# Patient Record
Sex: FEMALE | Race: WHITE | NOT HISPANIC OR LATINO | Employment: OTHER | URBAN - METROPOLITAN AREA
[De-identification: names, ages, dates, MRNs, and addresses within clinical notes are randomized per-mention and may not be internally consistent; named-entity substitution may affect disease eponyms.]

---

## 2017-08-22 ENCOUNTER — APPOINTMENT (OUTPATIENT)
Dept: WOMENS IMAGING | Facility: HOSPITAL | Age: 57
End: 2017-08-22

## 2017-08-22 PROCEDURE — 77067 SCR MAMMO BI INCL CAD: CPT | Performed by: RADIOLOGY

## 2017-10-25 ENCOUNTER — APPOINTMENT (OUTPATIENT)
Dept: WOMENS IMAGING | Facility: HOSPITAL | Age: 57
End: 2017-10-25

## 2017-10-25 PROCEDURE — 76641 ULTRASOUND BREAST COMPLETE: CPT | Performed by: RADIOLOGY

## 2017-10-25 PROCEDURE — G0206 DX MAMMO INCL CAD UNI: HCPCS | Performed by: RADIOLOGY

## 2017-10-25 PROCEDURE — G0279 TOMOSYNTHESIS, MAMMO: HCPCS | Performed by: RADIOLOGY

## 2017-10-25 PROCEDURE — 77065 DX MAMMO INCL CAD UNI: CPT | Performed by: RADIOLOGY

## 2017-10-25 PROCEDURE — 77061 BREAST TOMOSYNTHESIS UNI: CPT | Performed by: RADIOLOGY

## 2018-04-27 ENCOUNTER — APPOINTMENT (OUTPATIENT)
Dept: WOMENS IMAGING | Facility: HOSPITAL | Age: 58
End: 2018-04-27

## 2018-04-27 PROCEDURE — 76641 ULTRASOUND BREAST COMPLETE: CPT | Performed by: RADIOLOGY

## 2018-12-03 ENCOUNTER — APPOINTMENT (OUTPATIENT)
Dept: WOMENS IMAGING | Facility: HOSPITAL | Age: 58
End: 2018-12-03

## 2018-12-03 PROCEDURE — 77067 SCR MAMMO BI INCL CAD: CPT | Performed by: RADIOLOGY

## 2018-12-03 PROCEDURE — 77063 BREAST TOMOSYNTHESIS BI: CPT | Performed by: RADIOLOGY

## 2018-12-17 ENCOUNTER — OFFICE VISIT (OUTPATIENT)
Dept: MAMMOGRAPHY | Facility: CLINIC | Age: 58
End: 2018-12-17

## 2018-12-17 ENCOUNTER — PREP FOR SURGERY (OUTPATIENT)
Dept: OTHER | Facility: HOSPITAL | Age: 58
End: 2018-12-17

## 2018-12-17 VITALS
SYSTOLIC BLOOD PRESSURE: 122 MMHG | HEART RATE: 73 BPM | OXYGEN SATURATION: 95 % | DIASTOLIC BLOOD PRESSURE: 78 MMHG | TEMPERATURE: 98.9 F | WEIGHT: 135.7 LBS | HEIGHT: 64 IN | BODY MASS INDEX: 23.17 KG/M2

## 2018-12-17 DIAGNOSIS — N63.20 BREAST MASS, LEFT: Primary | ICD-10-CM

## 2018-12-17 DIAGNOSIS — N63.20 LEFT BREAST MASS: Primary | ICD-10-CM

## 2018-12-17 PROCEDURE — 99203 OFFICE O/P NEW LOW 30 MIN: CPT | Performed by: SURGERY

## 2018-12-17 RX ORDER — DICYCLOMINE HCL 20 MG
20 TABLET ORAL EVERY 6 HOURS PRN
COMMUNITY

## 2018-12-17 RX ORDER — UBIDECARENONE 100 MG
200 CAPSULE ORAL DAILY
COMMUNITY

## 2018-12-17 RX ORDER — ESTRADIOL 10 UG/1
1 INSERT VAGINAL 2 TIMES WEEKLY
COMMUNITY
End: 2019-02-20 | Stop reason: HOSPADM

## 2018-12-17 RX ORDER — MULTIPLE VITAMINS W/ MINERALS TAB 9MG-400MCG
1 TAB ORAL DAILY
COMMUNITY

## 2018-12-17 RX ORDER — CEFAZOLIN SODIUM 2 G/100ML
2 INJECTION, SOLUTION INTRAVENOUS ONCE
Status: CANCELLED | OUTPATIENT
Start: 2018-12-20 | End: 2018-12-17

## 2018-12-17 NOTE — H&P (VIEW-ONLY)
Chief Complaint: Bette Eldridge is a 58 y.o.. female here today for Breast Mass (Cyst on left nipple)        History of Present Illness:  Patient presents with a mass near the base of the left nipple.  She first noticed it about a year ago and said that it was the size of a pea.  It was not associated with any pain, nipple discharge, or trauma such as a nipple ring.  There has never been a history of infection.  Over the last year the area has increased in size.  Imaging studies were performed over the last several years.  In October 2017 the patient was noted to have retropectoral intact silicone implants.  The mammogram did not show any nipple irregularities.  An ultrasound however described a 7 mm focal skin thickening suggestive of a sebaceous cyst.  The patient had a follow-up ultrasound in April 2018 which showed a stable 7 mm in the skin still mostly compatible with a sebaceous cyst.  She then underwent mammograms in December of this year and no abnormalities were detected.  The patient has noted some change in the shape of the nipple.  Her family history is significant for a mother who had breast cancer.      Review of Systems:  Review of Systems   Gastrointestinal:        Has IBS   Skin:        Patient has cyst on left nipple. No itching or rash.    All other systems reviewed and are negative.       Past Medical and Surgical History:  Breast Biopsy History:  Patient has not had a breast biopsy in the past.  Breast Cancer HIstory:  Patient does not have a past medical history of breast cancer.  Breast Operations, and year:  Breast augmentation  Social History     Tobacco Use   Smoking Status Former Smoker   • Packs/day: 0.50   • Years: 10.00   • Pack years: 5.00   Smokeless Tobacco Never Used     Obstetric History:  Patient is postmenopausal due to removal of her uterus and one ovary in the following year: 2000   Number of pregnancies:6  Number of live births: 4  Number of abortions or miscarriages: 2  Age  of delivery of first child: 21  Patient breast fed, for the following lenth of time: 18 months  Length of time taking birth control pills:20 years  Patient is presently taking the following hormone replacement:Yuvafem    Past Surgical History:   Procedure Laterality Date   • BACK SURGERY     • BREAST AUGMENTATION     • BUNIONECTOMY     • HYSTERECTOMY         Past Medical History:   Diagnosis Date   • IBS (irritable bowel syndrome)        Prior Hospitalizations, other than for surgery or childbirth, and year:  None    Social History:  Patient is .  Patient has two daughters. and Patient has two sons.    Family History:  Family History   Problem Relation Age of Onset   • Breast cancer Mother    • Esophageal cancer Father        Vital Signs:  Vitals:    12/17/18 0829   BP: 122/78   Pulse: 73   Temp: 98.9 °F (37.2 °C)   SpO2: 95%       Medications:    Current Outpatient Prescriptions:     Current Outpatient Medications:   •  coenzyme Q10 100 MG capsule, Take 100 mg by mouth Daily., Disp: , Rfl:   •  collagenase 250 UNIT/GM ointment, Apply  topically to the appropriate area as directed Daily., Disp: , Rfl:   •  dicyclomine (BENTYL) 20 MG tablet, Take 20 mg by mouth Every 6 (Six) Hours., Disp: , Rfl:   •  estradiol (VAGIFEM) 10 MCG tablet vaginal tablet, Insert 1 tablet into the vagina 2 (Two) Times a Week., Disp: , Rfl:   •  Milk Thistle 1000 MG capsule, Take  by mouth., Disp: , Rfl:   •  Multiple Vitamins-Minerals (MULTIVITAMIN WITH MINERALS) tablet tablet, Take 1 tablet by mouth Daily., Disp: , Rfl:     Physical Examination:  General Appearance:   Patient is in no distress.  She is well kept and has an average build.   Psychiatric:  Patient with appropriate mood and affect. Alert and oriented to self, time, and place.    Breast, RIGHT:  medium sized, symmetric with the contralateral side.  Breast skin is without erythema, edema, rashes.  There are no visible abnormalities upon inspection during the arm-raising  maneuver or with hands on hips in the sitting position. There is no nipple retraction, discharge or nipple/areolar skin changes.There are no masses palpable in the sitting or supine positions.    Breast, LEFT:  medium sized, symmetric with the contralateral side.  Breast skin is without erythema, edema, rashes.  There are no visible abnormalities upon inspection during the arm-raising maneuver or with hands on hips in the sitting position. The nipple does seem to be oriented a little more in the medial direction.  There is a somewhat firm mass that appears to be involving the base of the nipple in the 5:00 axis and extends up towards the tip of the nipple.  There is a deeper aspect to it that is clearly palpable.  I do not see a classic skin connection that you would typically see with a sebaceous cyst.    Lymphatic:  There is no axillary, cervical, infraclavicular, or supraclavicular adenopathy bilaterally.  Eyes:  Pupils are round and reactive to light.  Cardiovascular:  Heart rate and rhythm are regular.  Respiratory:  Lungs are clear bilaterally with no crackles or wheezes in any lung field.  Gastrointestinal:  Abdomen is soft, nondistended, and nontender.  There was no evidence of hepatosplenomegaly or abdominal mass.  There are  scars from previous surgery.    Musculoskeletal:  Good strength in all 4 extremities.   There is good range of motion in both shoulders.    Skin:  No new skin lesions or rashes on the skin excluding the breast (see breast exam above).    Assessment:  1. Breast mass, left          Plan:  This palpable mass is somewhat suspicious to me.  It does not have the classic physical findings of a sebaceous cyst and the location is so odd.  I do not think it is amenable to a vacuum assisted core biopsy.  I think an open biopsy would be her best option and she agrees to that.  We have discussed what was involved with the operation and the recovery.  She understands the small risk of infection,  bleeding, and an anesthetic complication.      CPT coding:    Next Appointment:  No Follow-up on file.            EMR Dragon/transcription disclaimer:    Much of this encounter note is an electronic transcription/translocation of spoken language to printed text.  The electronic translation of spoken language may permit erroneous, or at times, nonsensical words or phrases to be inadvertently transcribed.  Although I have reviewed the note from such areas, some may still exist.

## 2018-12-20 ENCOUNTER — HOSPITAL ENCOUNTER (OUTPATIENT)
Facility: HOSPITAL | Age: 58
Setting detail: HOSPITAL OUTPATIENT SURGERY
Discharge: HOME | End: 2018-12-20
Attending: SURGERY | Admitting: SURGERY

## 2018-12-20 ENCOUNTER — ANESTHESIA EVENT (OUTPATIENT)
Dept: PERIOP | Facility: HOSPITAL | Age: 58
End: 2018-12-20

## 2018-12-20 ENCOUNTER — ANESTHESIA (OUTPATIENT)
Dept: PERIOP | Facility: HOSPITAL | Age: 58
End: 2018-12-20

## 2018-12-20 VITALS
RESPIRATION RATE: 16 BRPM | HEIGHT: 64 IN | OXYGEN SATURATION: 99 % | WEIGHT: 135.8 LBS | TEMPERATURE: 98 F | BODY MASS INDEX: 23.18 KG/M2 | SYSTOLIC BLOOD PRESSURE: 130 MMHG | DIASTOLIC BLOOD PRESSURE: 79 MMHG | HEART RATE: 60 BPM

## 2018-12-20 DIAGNOSIS — N63.20 LEFT BREAST MASS: ICD-10-CM

## 2018-12-20 LAB
DEPRECATED RDW RBC AUTO: 46 FL (ref 37–54)
ERYTHROCYTE [DISTWIDTH] IN BLOOD BY AUTOMATED COUNT: 13.2 % (ref 11.7–13)
HCT VFR BLD AUTO: 37.3 % (ref 35.6–45.5)
HGB BLD-MCNC: 12.7 G/DL (ref 11.9–15.5)
MCH RBC QN AUTO: 32.3 PG (ref 26.9–32)
MCHC RBC AUTO-ENTMCNC: 34 G/DL (ref 32.4–36.3)
MCV RBC AUTO: 94.9 FL (ref 80.5–98.2)
PLATELET # BLD AUTO: 174 10*3/MM3 (ref 140–500)
PMV BLD AUTO: 8.4 FL (ref 6–12)
RBC # BLD AUTO: 3.93 10*6/MM3 (ref 3.9–5.2)
WBC NRBC COR # BLD: 5.73 10*3/MM3 (ref 4.5–10.7)

## 2018-12-20 PROCEDURE — 25010000002 FENTANYL CITRATE (PF) 100 MCG/2ML SOLUTION: Performed by: ANESTHESIOLOGY

## 2018-12-20 PROCEDURE — 19120 REMOVAL OF BREAST LESION: CPT | Performed by: SURGERY

## 2018-12-20 PROCEDURE — 85027 COMPLETE CBC AUTOMATED: CPT | Performed by: SURGERY

## 2018-12-20 PROCEDURE — 88307 TISSUE EXAM BY PATHOLOGIST: CPT | Performed by: SURGERY

## 2018-12-20 PROCEDURE — 88342 IMHCHEM/IMCYTCHM 1ST ANTB: CPT | Performed by: SURGERY

## 2018-12-20 PROCEDURE — 25010000002 PROPOFOL 10 MG/ML EMULSION: Performed by: ANESTHESIOLOGY

## 2018-12-20 PROCEDURE — 88341 IMHCHEM/IMCYTCHM EA ADD ANTB: CPT | Performed by: SURGERY

## 2018-12-20 PROCEDURE — 25010000003 CEFAZOLIN IN DEXTROSE 2-4 GM/100ML-% SOLUTION: Performed by: SURGERY

## 2018-12-20 PROCEDURE — 25010000002 MIDAZOLAM PER 1 MG: Performed by: ANESTHESIOLOGY

## 2018-12-20 PROCEDURE — 88360 TUMOR IMMUNOHISTOCHEM/MANUAL: CPT | Performed by: SURGERY

## 2018-12-20 RX ORDER — SODIUM CHLORIDE, SODIUM LACTATE, POTASSIUM CHLORIDE, CALCIUM CHLORIDE 600; 310; 30; 20 MG/100ML; MG/100ML; MG/100ML; MG/100ML
9 INJECTION, SOLUTION INTRAVENOUS CONTINUOUS
Status: DISCONTINUED | OUTPATIENT
Start: 2018-12-20 | End: 2018-12-20 | Stop reason: HOSPADM

## 2018-12-20 RX ORDER — MAGNESIUM HYDROXIDE 1200 MG/15ML
LIQUID ORAL AS NEEDED
Status: DISCONTINUED | OUTPATIENT
Start: 2018-12-20 | End: 2018-12-20 | Stop reason: HOSPADM

## 2018-12-20 RX ORDER — BUPIVACAINE HYDROCHLORIDE 2.5 MG/ML
INJECTION, SOLUTION INFILTRATION; PERINEURAL AS NEEDED
Status: DISCONTINUED | OUTPATIENT
Start: 2018-12-20 | End: 2018-12-20 | Stop reason: HOSPADM

## 2018-12-20 RX ORDER — MIDAZOLAM HYDROCHLORIDE 1 MG/ML
1 INJECTION INTRAMUSCULAR; INTRAVENOUS
Status: DISCONTINUED | OUTPATIENT
Start: 2018-12-20 | End: 2018-12-20 | Stop reason: HOSPADM

## 2018-12-20 RX ORDER — MIDAZOLAM HYDROCHLORIDE 1 MG/ML
2 INJECTION INTRAMUSCULAR; INTRAVENOUS
Status: DISCONTINUED | OUTPATIENT
Start: 2018-12-20 | End: 2018-12-20 | Stop reason: HOSPADM

## 2018-12-20 RX ORDER — PROPOFOL 10 MG/ML
VIAL (ML) INTRAVENOUS AS NEEDED
Status: DISCONTINUED | OUTPATIENT
Start: 2018-12-20 | End: 2018-12-20 | Stop reason: SURG

## 2018-12-20 RX ORDER — FENTANYL CITRATE 50 UG/ML
25 INJECTION, SOLUTION INTRAMUSCULAR; INTRAVENOUS
Status: DISCONTINUED | OUTPATIENT
Start: 2018-12-20 | End: 2018-12-20 | Stop reason: HOSPADM

## 2018-12-20 RX ORDER — SODIUM CHLORIDE 0.9 % (FLUSH) 0.9 %
1-10 SYRINGE (ML) INJECTION AS NEEDED
Status: DISCONTINUED | OUTPATIENT
Start: 2018-12-20 | End: 2018-12-20 | Stop reason: HOSPADM

## 2018-12-20 RX ORDER — LIDOCAINE HYDROCHLORIDE 10 MG/ML
0.5 INJECTION, SOLUTION EPIDURAL; INFILTRATION; INTRACAUDAL; PERINEURAL ONCE AS NEEDED
Status: DISCONTINUED | OUTPATIENT
Start: 2018-12-20 | End: 2018-12-20 | Stop reason: HOSPADM

## 2018-12-20 RX ORDER — PROMETHAZINE HYDROCHLORIDE 25 MG/1
25 TABLET ORAL ONCE AS NEEDED
Status: DISCONTINUED | OUTPATIENT
Start: 2018-12-20 | End: 2018-12-20 | Stop reason: HOSPADM

## 2018-12-20 RX ORDER — FENTANYL CITRATE 50 UG/ML
50 INJECTION, SOLUTION INTRAMUSCULAR; INTRAVENOUS
Status: DISCONTINUED | OUTPATIENT
Start: 2018-12-20 | End: 2018-12-20 | Stop reason: HOSPADM

## 2018-12-20 RX ORDER — LIDOCAINE HYDROCHLORIDE 20 MG/ML
INJECTION, SOLUTION INFILTRATION; PERINEURAL AS NEEDED
Status: DISCONTINUED | OUTPATIENT
Start: 2018-12-20 | End: 2018-12-20 | Stop reason: SURG

## 2018-12-20 RX ORDER — FAMOTIDINE 10 MG/ML
20 INJECTION, SOLUTION INTRAVENOUS ONCE
Status: COMPLETED | OUTPATIENT
Start: 2018-12-20 | End: 2018-12-20

## 2018-12-20 RX ORDER — PROMETHAZINE HYDROCHLORIDE 25 MG/1
25 SUPPOSITORY RECTAL ONCE AS NEEDED
Status: DISCONTINUED | OUTPATIENT
Start: 2018-12-20 | End: 2018-12-20 | Stop reason: HOSPADM

## 2018-12-20 RX ORDER — FENTANYL CITRATE 50 UG/ML
INJECTION, SOLUTION INTRAMUSCULAR; INTRAVENOUS AS NEEDED
Status: DISCONTINUED | OUTPATIENT
Start: 2018-12-20 | End: 2018-12-20 | Stop reason: SURG

## 2018-12-20 RX ORDER — HYDROCODONE BITARTRATE AND ACETAMINOPHEN 5; 325 MG/1; MG/1
1-2 TABLET ORAL EVERY 4 HOURS PRN
Qty: 6 TABLET | Refills: 0 | Status: SHIPPED | OUTPATIENT
Start: 2018-12-20 | End: 2019-02-11

## 2018-12-20 RX ORDER — CEFAZOLIN SODIUM 2 G/100ML
2 INJECTION, SOLUTION INTRAVENOUS ONCE
Status: COMPLETED | OUTPATIENT
Start: 2018-12-20 | End: 2018-12-20

## 2018-12-20 RX ORDER — PROMETHAZINE HYDROCHLORIDE 25 MG/ML
6.25 INJECTION, SOLUTION INTRAMUSCULAR; INTRAVENOUS ONCE AS NEEDED
Status: DISCONTINUED | OUTPATIENT
Start: 2018-12-20 | End: 2018-12-20 | Stop reason: HOSPADM

## 2018-12-20 RX ORDER — HYDROCODONE BITARTRATE AND ACETAMINOPHEN 5; 325 MG/1; MG/1
1 TABLET ORAL ONCE AS NEEDED
Status: COMPLETED | OUTPATIENT
Start: 2018-12-20 | End: 2018-12-20

## 2018-12-20 RX ADMIN — HYDROCODONE BITARTRATE AND ACETAMINOPHEN 1 TABLET: 5; 325 TABLET ORAL at 17:55

## 2018-12-20 RX ADMIN — MIDAZOLAM HYDROCHLORIDE 2 MG: 2 INJECTION, SOLUTION INTRAMUSCULAR; INTRAVENOUS at 14:49

## 2018-12-20 RX ADMIN — SODIUM CHLORIDE, POTASSIUM CHLORIDE, SODIUM LACTATE AND CALCIUM CHLORIDE 9 ML/HR: 600; 310; 30; 20 INJECTION, SOLUTION INTRAVENOUS at 14:09

## 2018-12-20 RX ADMIN — FENTANYL CITRATE 50 MCG: 50 INJECTION INTRAMUSCULAR; INTRAVENOUS at 16:09

## 2018-12-20 RX ADMIN — LIDOCAINE HYDROCHLORIDE 60 MG: 20 INJECTION, SOLUTION INFILTRATION; PERINEURAL at 16:09

## 2018-12-20 RX ADMIN — FENTANYL CITRATE 50 MCG: 50 INJECTION INTRAMUSCULAR; INTRAVENOUS at 16:49

## 2018-12-20 RX ADMIN — FAMOTIDINE 20 MG: 10 INJECTION, SOLUTION INTRAVENOUS at 14:49

## 2018-12-20 RX ADMIN — CEFAZOLIN SODIUM 2 G: 2 INJECTION, SOLUTION INTRAVENOUS at 16:04

## 2018-12-20 RX ADMIN — PROPOFOL 150 MG: 10 INJECTION, EMULSION INTRAVENOUS at 16:09

## 2018-12-20 NOTE — ANESTHESIA POSTPROCEDURE EVALUATION
Patient: SARITHA Eldridge    Procedure Summary     Date:  12/20/18 Room / Location:  [facility] OR  / [facility] MAIN OR    Anesthesia Start:  1604 Anesthesia Stop:  1657    Procedure:  BREAST BIOPSY (Left Breast) Diagnosis:       Left breast mass      (Left breast mass [N63.20])    Surgeon:  Mike Adams MD Provider:  Zach Villagomez MD    Anesthesia Type:  general ASA Status:  2          Anesthesia Type: general  Last vitals  BP   118/75 (12/20/18 1815)   Temp   36.7 °C (98 °F) (12/20/18 1750)   Pulse   76 (12/20/18 1815)   Resp   16 (12/20/18 1815)     SpO2   97 % (12/20/18 1815)     Post Anesthesia Care and Evaluation    Patient location during evaluation: PHASE II  Patient participation: complete - patient participated  Level of consciousness: awake and alert  Pain management: adequate  Airway patency: patent  Anesthetic complications: No anesthetic complications  PONV Status: none  Cardiovascular status: acceptable  Respiratory status: acceptable  Hydration status: acceptable

## 2018-12-20 NOTE — ANESTHESIA PREPROCEDURE EVALUATION
Anesthesia Evaluation     Patient summary reviewed   NPO Solid Status: > 8 hours  NPO Liquid Status: > 2 hours           Airway   Mallampati: II  TM distance: >3 FB  Dental      Pulmonary    Cardiovascular     Rhythm: regular  Rate: normal        Neuro/Psych  GI/Hepatic/Renal/Endo      ROS Comment: IBS    Musculoskeletal     Abdominal    Substance History      OB/GYN          Other                        Anesthesia Plan    ASA 2     general     intravenous induction   Anesthetic plan, all risks, benefits, and alternatives have been provided, discussed and informed consent has been obtained with: patient.

## 2018-12-20 NOTE — OP NOTE
Operative note    Preoperative Diagnosis: Breast mass    Postoperative Diagnosis: Same    Procedure Performed:left breast  excisional biopsy    Dictating physician and surgeon: Mike Adams MD  Indications-the patient is a 58-year-old white female who has had a mass at the base of the nipple which is gradually increased in size.  Imaging studies suggest a sebaceous cyst but it does not appear to represent that on physical examination.  Her mother had breast cancer and I feel that biopsy of this area would be the best thing to do.    EBL: Less than 5 cc.    FINDINGS AND DESCRIPTION OF PROCEDURE:     The patient was taken to the operating room and placed on the operating table in the supine position and given adequate general anesthesia. The left breast was then prepped and draped in sterile fashion.  The involved area was anesthetized with a combination of quarter percent Marcaine plain and 1% Xylocaine with epinephrine.  A total of 8 cc was used.  An elliptical skin incision was made over the palpable lump at the inferior base of the nipple.  Dissection was carried down around the mass in a circumferential fashion and the specimen was removed and sent to pathology for permanent sections.          The wound was irrigated and hemostasis obtained using electrocautery unit.  The incision was then closed in layers using 3-0 Vicryl suture for the subcutaneous layer and a running 4-0 Vicryl subcuticular stitch for the skin.   Steri-Strips and a Tegaderm dressing were applied.     Sponge and needle counts were correct and the patient was taken to the recovery area in stable condition.    Specimen-left subareolar tissue

## 2018-12-26 DIAGNOSIS — C50.112 MALIGNANT NEOPLASM OF CENTRAL PORTION OF LEFT BREAST IN FEMALE, ESTROGEN RECEPTOR POSITIVE (HCC): Primary | ICD-10-CM

## 2018-12-26 DIAGNOSIS — Z17.0 MALIGNANT NEOPLASM OF CENTRAL PORTION OF LEFT BREAST IN FEMALE, ESTROGEN RECEPTOR POSITIVE (HCC): Primary | ICD-10-CM

## 2018-12-31 LAB
CYTO UR: NORMAL
LAB AP CASE REPORT: NORMAL
LAB AP DIAGNOSIS COMMENT: NORMAL
LAB AP SPECIAL STAINS: NORMAL
LAB AP SYNOPTIC CHECKLIST: NORMAL
PATH REPORT.FINAL DX SPEC: NORMAL
PATH REPORT.GROSS SPEC: NORMAL

## 2019-01-07 ENCOUNTER — HOSPITAL ENCOUNTER (OUTPATIENT)
Dept: MRI IMAGING | Facility: HOSPITAL | Age: 59
Discharge: HOME | End: 2019-01-07
Attending: SURGERY | Admitting: SURGERY

## 2019-01-07 ENCOUNTER — TELEPHONE (OUTPATIENT)
Dept: MAMMOGRAPHY | Facility: CLINIC | Age: 59
End: 2019-01-07

## 2019-01-07 DIAGNOSIS — C50.112 MALIGNANT NEOPLASM OF CENTRAL PORTION OF LEFT BREAST IN FEMALE, ESTROGEN RECEPTOR POSITIVE (HCC): ICD-10-CM

## 2019-01-07 DIAGNOSIS — Z17.0 MALIGNANT NEOPLASM OF CENTRAL PORTION OF LEFT BREAST IN FEMALE, ESTROGEN RECEPTOR POSITIVE (HCC): ICD-10-CM

## 2019-01-07 PROCEDURE — 77049 MRI BREAST C-+ W/CAD BI: CPT

## 2019-01-07 PROCEDURE — 0 GADOBENATE DIMEGLUMINE 529 MG/ML SOLUTION: Performed by: SURGERY

## 2019-01-07 PROCEDURE — 82565 ASSAY OF CREATININE: CPT

## 2019-01-07 PROCEDURE — A9577 INJ MULTIHANCE: HCPCS | Performed by: SURGERY

## 2019-01-07 RX ADMIN — GADOBENATE DIMEGLUMINE 12 ML: 529 INJECTION, SOLUTION INTRAVENOUS at 20:45

## 2019-01-07 NOTE — TELEPHONE ENCOUNTER
I was able to give her the report port of her receptors.  She is going to [place] and will return in time for her visit in the office on 1/17/2019

## 2019-01-08 LAB — CREAT BLDA-MCNC: 0.8 MG/DL (ref 0.6–1.3)

## 2019-01-09 ENCOUNTER — TELEPHONE (OUTPATIENT)
Dept: MAMMOGRAPHY | Facility: CLINIC | Age: 59
End: 2019-01-09

## 2019-01-09 NOTE — TELEPHONE ENCOUNTER
I gave her the results of her MRI in a message over the phone.  She is going to come in for breast cancer consult on 1/17/2019.

## 2019-01-18 ENCOUNTER — OFFICE VISIT (OUTPATIENT)
Dept: MAMMOGRAPHY | Facility: CLINIC | Age: 59
End: 2019-01-18

## 2019-01-18 VITALS
DIASTOLIC BLOOD PRESSURE: 64 MMHG | SYSTOLIC BLOOD PRESSURE: 122 MMHG | TEMPERATURE: 98.3 F | HEART RATE: 78 BPM | OXYGEN SATURATION: 98 %

## 2019-01-18 DIAGNOSIS — C50.112 MALIGNANT NEOPLASM OF CENTRAL PORTION OF LEFT BREAST IN FEMALE, ESTROGEN RECEPTOR POSITIVE (HCC): Primary | ICD-10-CM

## 2019-01-18 DIAGNOSIS — Z17.0 MALIGNANT NEOPLASM OF CENTRAL PORTION OF LEFT BREAST IN FEMALE, ESTROGEN RECEPTOR POSITIVE (HCC): Primary | ICD-10-CM

## 2019-01-18 PROCEDURE — 99215 OFFICE O/P EST HI 40 MIN: CPT | Performed by: SURGERY

## 2019-01-18 NOTE — PROGRESS NOTES
Fran Eldridge is a 58 y.o. female     History of Present Illness   The patient returns today to discuss findings from her recent breast biopsy.  Unfortunately the pathology report has returned revealing an 8 mm invasive ductal cancer with positive margins.  The tumor was ER positive, UT positive, and HER-2 negative.  The Ki-67 was 11%.      Review of Systems  Past Medical History:   Diagnosis Date   • Breast lump     LEFT   • IBS (irritable bowel syndrome)    • Mild scoliosis      Past Surgical History:   Procedure Laterality Date   • BACK SURGERY  07/2018   • BREAST AUGMENTATION     • BREAST BIOPSY Left 12/20/2018    Procedure: BREAST BIOPSY;  Surgeon: Mike Adams MD;  Location: [facility] OR;  Service: General   • BUNIONECTOMY Bilateral    • CYSTOCELE REPAIR     • HERNIA REPAIR      X2   • HYSTERECTOMY       Family History   Problem Relation Age of Onset   • Breast cancer Mother    • Esophageal cancer Father    • Malig Hyperthermia Neg Hx      Social History     Socioeconomic History   • Marital status:      Spouse name: Not on file   • Number of children: Not on file   • Years of education: Not on file   • Highest education level: Not on file   Social Needs   • Financial resource strain: Not on file   • Food insecurity - worry: Not on file   • Food insecurity - inability: Not on file   • Transportation needs - medical: Not on file   • Transportation needs - non-medical: Not on file   Occupational History   • Not on file   Tobacco Use   • Smoking status: Former Smoker     Packs/day: 0.50     Years: 10.00     Pack years: 5.00     Types: Cigarettes   • Smokeless tobacco: Never Used   • Tobacco comment: QUIT 20-30 YEARS AGO   Substance and Sexual Activity   • Alcohol use: Yes     Alcohol/week: 4.2 - 8.4 oz     Types: 7 - 14 Glasses of wine per week   • Drug use: No   • Sexual activity: Defer   Other Topics Concern   • Not on file   Social History Narrative   • Not on file        Objective   Physical Exam  Gen.-average weight white female in no acute distress  Vital signs-blood pressure 122/64, pulse 78, temperature 98.3  Left breast-she has a healing incision around the base of the nipple.  I do not see any signs of infection and there is no wound drainage.  Assessment/Plan   The patient was accompanied by her  today.  The office visit lasted 1 hour and 20 minutes with 1 hour and 5 minutes spent in face-to-face consultation.    We began the conversation discussing her pathology report.  We talked about the origin of most of breast cancers from either the ducts or the lobules.  The difference between invasive and in situ disease was explained and the visual was drawn for her.  When invasion has occurred, the lymph nodes need to be evaluated.  When there is in situ disease the lymph nodes should be considered if the area of concern is widespread or it is high-grade.  We also discussed the significance of hormone receptors.  They often can give us some idea how the breast cancer will behave and potentially lead us to offer neoadjuvant chemotherapy.    Next we discussed the surgical options which include breast conserving therapy versus a mastectomy.  With breast conserving therapy we are talking about a lumpectomy with margins, lymph node evaluation, and radiation treatment.  The radiation treatment is generally given to the entire breast for 6 weeks.  The side effects consist of local skin change and potential injury to nearby structures such as the lung or the heart.  A mastectomy would involve removing the breast tissues with preservation of the pectoral muscles and evaluation of the lymph nodes if indicated.  The potential for reconstruction by either an implant or autologous tissue was discussed.  This could be performed on a delayed basis or immediately depending on a multitude of factors.  The survival rates for these 2 procedures are equivalent but there are incidences  where one may be favored over the other.  There are times when a lumpectomy is not possible due to the large tumor to breast ratio or the location of the tumor.  Previous radiation to the chest wall or collagen disorders such as scleroderma would make radiation treatment and possible and therefore exclude breast conserving therapy as an option.    Her tumor is in a very unusual place.  If we go with breast conserving surgery she will need a central lumpectomy.  She was somewhat tearful about this but she could undergo some reconstruction with tattooing.  If we proceeded with a mastectomy she would require removal of the implant and placement of an expander until the permanent implant could be placed.  That was not preferable to her.  I think she also would qualify for genetic testing.  Her mother had breast cancer and a brother had leukemia and I think colon cancer.  She wants to think about this before deciding upon that.  For now we will proceed with plans to do a central lumpectomy and sentinel lymph node biopsy.    The encounter diagnosis was Malignant neoplasm of central portion of left breast in female, estrogen receptor positive (CMS/HCC).

## 2019-01-22 ENCOUNTER — TELEPHONE (OUTPATIENT)
Dept: OTHER | Facility: HOSPITAL | Age: 59
End: 2019-01-22

## 2019-01-22 ENCOUNTER — PREP FOR SURGERY (OUTPATIENT)
Dept: OTHER | Facility: HOSPITAL | Age: 59
End: 2019-01-22

## 2019-01-22 DIAGNOSIS — Z17.0 MALIGNANT NEOPLASM OF CENTRAL PORTION OF LEFT BREAST IN FEMALE, ESTROGEN RECEPTOR POSITIVE (HCC): Primary | ICD-10-CM

## 2019-01-22 DIAGNOSIS — C50.112 MALIGNANT NEOPLASM OF CENTRAL PORTION OF LEFT BREAST IN FEMALE, ESTROGEN RECEPTOR POSITIVE (HCC): Primary | ICD-10-CM

## 2019-01-22 RX ORDER — LIDOCAINE AND PRILOCAINE 25; 25 MG/G; MG/G
CREAM TOPICAL ONCE
Status: CANCELLED | OUTPATIENT
Start: 2019-02-20 | End: 2019-01-22

## 2019-01-22 RX ORDER — CEFAZOLIN SODIUM 2 G/100ML
2 INJECTION, SOLUTION INTRAVENOUS ONCE
Status: CANCELLED | OUTPATIENT
Start: 2019-02-20 | End: 2019-01-22

## 2019-01-22 RX ORDER — CELECOXIB 200 MG/1
400 CAPSULE ORAL ONCE
Status: CANCELLED | OUTPATIENT
Start: 2019-02-20 | End: 2019-01-22

## 2019-01-22 RX ORDER — ACETAMINOPHEN 500 MG
1000 TABLET ORAL ONCE
Status: CANCELLED | OUTPATIENT
Start: 2019-02-20 | End: 2019-01-22

## 2019-01-22 RX ORDER — DIAZEPAM 5 MG/1
10 TABLET ORAL ONCE
Status: CANCELLED | OUTPATIENT
Start: 2019-02-20 | End: 2019-01-22

## 2019-01-22 NOTE — TELEPHONE ENCOUNTER
Referral received from Dr. Adams's office. I called Bette and introduced myself and navigational services. She stated the surgery consult went well and she was leaning toward a lumpectomy, but wasn't sure yet. She had questions about genetics and how that worked. I answered some of her questions, but encouraged her to talk to Dr. Adams about his thoughts regarding the need for the testing in her case. She stated she would wait to talk to him and let me know.     We discussed integrative therapies and other services at [facility] including the opportunity to speak with our Oncology Dietitian or Oncology Social Worker. She verbalized interest in receiving a navigation folder outlining services. I verified her mailing address and will send out a navigation folder with the following information:     [facility],  Sharing Our Stories Breast Cancer Support Group, Cancer and Restorative Exercise (CARE), [employer]  Exercise program, Together for Breast Cancer Survival,  For Women Facing Breast Cancer,   Biotab,  Cancer Resource [place], Massage Therapy, Reiki Therapy, [employer] [place], Cancer Nutrition, Survivorship Clinic.     She verbalized appreciation for navigational services and she has my contact information and will call with any questions that arise.

## 2019-01-24 PROBLEM — C50.112 MALIGNANT NEOPLASM OF CENTRAL PORTION OF LEFT BREAST IN FEMALE, ESTROGEN RECEPTOR POSITIVE (HCC): Status: ACTIVE | Noted: 2019-01-24

## 2019-01-24 PROBLEM — Z17.0 MALIGNANT NEOPLASM OF CENTRAL PORTION OF LEFT BREAST IN FEMALE, ESTROGEN RECEPTOR POSITIVE (HCC): Status: ACTIVE | Noted: 2019-01-24

## 2019-01-29 ENCOUNTER — CLINICAL SUPPORT (OUTPATIENT)
Dept: OTHER | Facility: HOSPITAL | Age: 59
End: 2019-01-29

## 2019-01-29 DIAGNOSIS — Z80.3 FAMILY HISTORY OF BREAST CANCER: ICD-10-CM

## 2019-01-29 DIAGNOSIS — Z80.6 FAMILY HISTORY OF LEUKEMIA: ICD-10-CM

## 2019-01-29 DIAGNOSIS — Z17.0 MALIGNANT NEOPLASM OF UPPER-INNER QUADRANT OF LEFT BREAST IN FEMALE, ESTROGEN RECEPTOR POSITIVE (HCC): Primary | ICD-10-CM

## 2019-01-29 DIAGNOSIS — C50.212 MALIGNANT NEOPLASM OF UPPER-INNER QUADRANT OF LEFT BREAST IN FEMALE, ESTROGEN RECEPTOR POSITIVE (HCC): Primary | ICD-10-CM

## 2019-01-29 PROCEDURE — G0463 HOSPITAL OUTPT CLINIC VISIT: HCPCS

## 2019-01-29 NOTE — PATIENT INSTRUCTIONS
Patient seen by Dr. Santos for genetic counseling and testing. Lab drawn with 21 gauge butterfly needle in the Left AC times 1 attempt. Stat lab sent to [facility]. Patient informed she would receive phone call with test results.

## 2019-02-04 ENCOUNTER — TELEPHONE (OUTPATIENT)
Dept: MAMMOGRAPHY | Facility: CLINIC | Age: 59
End: 2019-02-04

## 2019-02-04 NOTE — TELEPHONE ENCOUNTER
Patient called to say her daughter has Hepatitis A. Her surgery is Feb. 20 and she is concerned that she's been exposed. She stated she has had the series of immunizations and she thinks it was for all Hepatitis.

## 2019-02-04 NOTE — TELEPHONE ENCOUNTER
I left a message stating that I am not terribly concerned about her potential exposure.  We have more than 2 weeks to see how she is doing and it sounds like she has been vaccinated.

## 2019-02-05 ENCOUNTER — TELEPHONE (OUTPATIENT)
Dept: MAMMOGRAPHY | Facility: CLINIC | Age: 59
End: 2019-02-05

## 2019-02-05 NOTE — TELEPHONE ENCOUNTER
Pt called.  She received Dr Sahu message but wanted to clarify.  Her daughter OR the doctors office relayed the message incorrectly.  Her daughter has had the vaccine and that is what those results were for (the positive) She just wanted to make sure we corrected that in the chart. This is to serve as documentation only.

## 2019-02-11 ENCOUNTER — APPOINTMENT (OUTPATIENT)
Dept: PREADMISSION TESTING | Facility: HOSPITAL | Age: 59
End: 2019-02-11

## 2019-02-11 VITALS
SYSTOLIC BLOOD PRESSURE: 125 MMHG | BODY MASS INDEX: 23.28 KG/M2 | OXYGEN SATURATION: 100 % | TEMPERATURE: 97.8 F | DIASTOLIC BLOOD PRESSURE: 75 MMHG | HEART RATE: 88 BPM | HEIGHT: 64 IN | RESPIRATION RATE: 16 BRPM | WEIGHT: 136.38 LBS

## 2019-02-11 LAB
DEPRECATED RDW RBC AUTO: 44.5 FL (ref 37–54)
ERYTHROCYTE [DISTWIDTH] IN BLOOD BY AUTOMATED COUNT: 12.4 % (ref 12.3–15.4)
HCT VFR BLD AUTO: 40.3 % (ref 34–46.6)
HGB BLD-MCNC: 13.5 G/DL (ref 12–15.9)
MCH RBC QN AUTO: 32.5 PG (ref 26.6–33)
MCHC RBC AUTO-ENTMCNC: 33.5 G/DL (ref 31.5–35.7)
MCV RBC AUTO: 96.9 FL (ref 79–97)
PLATELET # BLD AUTO: 189 10*3/MM3 (ref 140–450)
PMV BLD AUTO: 9 FL (ref 6–12)
RBC # BLD AUTO: 4.16 10*6/MM3 (ref 3.77–5.28)
WBC NRBC COR # BLD: 4.95 10*3/MM3 (ref 3.4–10.8)

## 2019-02-11 PROCEDURE — 36415 COLL VENOUS BLD VENIPUNCTURE: CPT

## 2019-02-11 PROCEDURE — 85027 COMPLETE CBC AUTOMATED: CPT | Performed by: SURGERY

## 2019-02-11 RX ORDER — FLUTICASONE PROPIONATE 50 MCG
2 SPRAY, SUSPENSION (ML) NASAL DAILY
COMMUNITY
Start: 2017-05-14

## 2019-02-11 NOTE — DISCHARGE INSTRUCTIONS

## 2019-02-20 ENCOUNTER — ANESTHESIA (OUTPATIENT)
Dept: PERIOP | Facility: HOSPITAL | Age: 59
End: 2019-02-20

## 2019-02-20 ENCOUNTER — ANESTHESIA EVENT (OUTPATIENT)
Dept: PERIOP | Facility: HOSPITAL | Age: 59
End: 2019-02-20

## 2019-02-20 ENCOUNTER — HOSPITAL ENCOUNTER (OUTPATIENT)
Dept: NUCLEAR MEDICINE | Facility: HOSPITAL | Age: 59
Discharge: HOME | End: 2019-02-20
Attending: SURGERY

## 2019-02-20 ENCOUNTER — HOSPITAL ENCOUNTER (OUTPATIENT)
Facility: HOSPITAL | Age: 59
Setting detail: HOSPITAL OUTPATIENT SURGERY
Discharge: HOME | End: 2019-02-20
Attending: SURGERY | Admitting: SURGERY

## 2019-02-20 VITALS
HEART RATE: 80 BPM | SYSTOLIC BLOOD PRESSURE: 124 MMHG | DIASTOLIC BLOOD PRESSURE: 72 MMHG | TEMPERATURE: 98 F | WEIGHT: 134.7 LBS | RESPIRATION RATE: 16 BRPM | HEIGHT: 64 IN | BODY MASS INDEX: 23 KG/M2 | OXYGEN SATURATION: 96 %

## 2019-02-20 DIAGNOSIS — C50.112 MALIGNANT NEOPLASM OF CENTRAL PORTION OF LEFT BREAST IN FEMALE, ESTROGEN RECEPTOR POSITIVE (HCC): ICD-10-CM

## 2019-02-20 DIAGNOSIS — Z17.0 MALIGNANT NEOPLASM OF CENTRAL PORTION OF LEFT BREAST IN FEMALE, ESTROGEN RECEPTOR POSITIVE (HCC): ICD-10-CM

## 2019-02-20 PROCEDURE — 25010000002 PROPOFOL 10 MG/ML EMULSION: Performed by: NURSE ANESTHETIST, CERTIFIED REGISTERED

## 2019-02-20 PROCEDURE — 19301 PARTIAL MASTECTOMY: CPT | Performed by: SURGERY

## 2019-02-20 PROCEDURE — 38792 RA TRACER ID OF SENTINL NODE: CPT

## 2019-02-20 PROCEDURE — 0 TECHNETIUM FILTERED SULFUR COLLOID: Performed by: SURGERY

## 2019-02-20 PROCEDURE — 63710000001 PROMETHAZINE PER 25 MG: Performed by: NURSE ANESTHETIST, CERTIFIED REGISTERED

## 2019-02-20 PROCEDURE — 88307 TISSUE EXAM BY PATHOLOGIST: CPT | Performed by: SURGERY

## 2019-02-20 PROCEDURE — 25010000003 CEFAZOLIN IN DEXTROSE 2-4 GM/100ML-% SOLUTION: Performed by: SURGERY

## 2019-02-20 PROCEDURE — 25010000002 FENTANYL CITRATE (PF) 100 MCG/2ML SOLUTION: Performed by: NURSE ANESTHETIST, CERTIFIED REGISTERED

## 2019-02-20 PROCEDURE — 25010000002 ONDANSETRON PER 1 MG: Performed by: NURSE ANESTHETIST, CERTIFIED REGISTERED

## 2019-02-20 PROCEDURE — 38525 BIOPSY/REMOVAL LYMPH NODES: CPT | Performed by: SURGERY

## 2019-02-20 PROCEDURE — 25010000002 SUCCINYLCHOLINE PER 20 MG: Performed by: NURSE ANESTHETIST, CERTIFIED REGISTERED

## 2019-02-20 PROCEDURE — 88342 IMHCHEM/IMCYTCHM 1ST ANTB: CPT | Performed by: SURGERY

## 2019-02-20 PROCEDURE — 25010000002 HYDROMORPHONE PER 4 MG: Performed by: NURSE ANESTHETIST, CERTIFIED REGISTERED

## 2019-02-20 PROCEDURE — 38900 IO MAP OF SENT LYMPH NODE: CPT | Performed by: SURGERY

## 2019-02-20 PROCEDURE — 25010000002 DEXAMETHASONE PER 1 MG: Performed by: NURSE ANESTHETIST, CERTIFIED REGISTERED

## 2019-02-20 PROCEDURE — A9541 TC99M SULFUR COLLOID: HCPCS | Performed by: SURGERY

## 2019-02-20 PROCEDURE — 25010000002 MIDAZOLAM PER 1 MG: Performed by: ANESTHESIOLOGY

## 2019-02-20 RX ORDER — CELECOXIB 200 MG/1
400 CAPSULE ORAL ONCE
Status: COMPLETED | OUTPATIENT
Start: 2019-02-20 | End: 2019-02-20

## 2019-02-20 RX ORDER — OXYCODONE AND ACETAMINOPHEN 7.5; 325 MG/1; MG/1
1 TABLET ORAL ONCE AS NEEDED
Status: COMPLETED | OUTPATIENT
Start: 2019-02-20 | End: 2019-02-20

## 2019-02-20 RX ORDER — NALOXONE HCL 0.4 MG/ML
0.2 VIAL (ML) INJECTION AS NEEDED
Status: DISCONTINUED | OUTPATIENT
Start: 2019-02-20 | End: 2019-02-20 | Stop reason: HOSPADM

## 2019-02-20 RX ORDER — BUPIVACAINE HYDROCHLORIDE 2.5 MG/ML
INJECTION, SOLUTION INFILTRATION; PERINEURAL AS NEEDED
Status: DISCONTINUED | OUTPATIENT
Start: 2019-02-20 | End: 2019-02-20 | Stop reason: HOSPADM

## 2019-02-20 RX ORDER — HYDROCODONE BITARTRATE AND ACETAMINOPHEN 5; 325 MG/1; MG/1
1-2 TABLET ORAL EVERY 4 HOURS PRN
Qty: 6 TABLET | Refills: 0 | Status: SHIPPED | OUTPATIENT
Start: 2019-02-20 | End: 2019-03-04

## 2019-02-20 RX ORDER — MIDAZOLAM HYDROCHLORIDE 1 MG/ML
2 INJECTION INTRAMUSCULAR; INTRAVENOUS
Status: DISCONTINUED | OUTPATIENT
Start: 2019-02-20 | End: 2019-02-20 | Stop reason: HOSPADM

## 2019-02-20 RX ORDER — MAGNESIUM HYDROXIDE 1200 MG/15ML
LIQUID ORAL AS NEEDED
Status: DISCONTINUED | OUTPATIENT
Start: 2019-02-20 | End: 2019-02-20 | Stop reason: HOSPADM

## 2019-02-20 RX ORDER — HYDRALAZINE HYDROCHLORIDE 20 MG/ML
5 INJECTION INTRAMUSCULAR; INTRAVENOUS
Status: DISCONTINUED | OUTPATIENT
Start: 2019-02-20 | End: 2019-02-20 | Stop reason: HOSPADM

## 2019-02-20 RX ORDER — TIZANIDINE 4 MG/1
2 TABLET ORAL 2 TIMES DAILY PRN
COMMUNITY
End: 2020-02-13

## 2019-02-20 RX ORDER — LIDOCAINE HYDROCHLORIDE 10 MG/ML
0.5 INJECTION, SOLUTION EPIDURAL; INFILTRATION; INTRACAUDAL; PERINEURAL ONCE AS NEEDED
Status: DISCONTINUED | OUTPATIENT
Start: 2019-02-20 | End: 2019-02-20 | Stop reason: HOSPADM

## 2019-02-20 RX ORDER — LIDOCAINE HYDROCHLORIDE 20 MG/ML
INJECTION, SOLUTION INFILTRATION; PERINEURAL AS NEEDED
Status: DISCONTINUED | OUTPATIENT
Start: 2019-02-20 | End: 2019-02-20 | Stop reason: SURG

## 2019-02-20 RX ORDER — ROCURONIUM BROMIDE 10 MG/ML
INJECTION, SOLUTION INTRAVENOUS AS NEEDED
Status: DISCONTINUED | OUTPATIENT
Start: 2019-02-20 | End: 2019-02-20 | Stop reason: SURG

## 2019-02-20 RX ORDER — SCOLOPAMINE TRANSDERMAL SYSTEM 1 MG/1
1 PATCH, EXTENDED RELEASE TRANSDERMAL ONCE
Status: DISCONTINUED | OUTPATIENT
Start: 2019-02-20 | End: 2019-02-20 | Stop reason: HOSPADM

## 2019-02-20 RX ORDER — DEXAMETHASONE SODIUM PHOSPHATE 10 MG/ML
INJECTION INTRAMUSCULAR; INTRAVENOUS AS NEEDED
Status: DISCONTINUED | OUTPATIENT
Start: 2019-02-20 | End: 2019-02-20 | Stop reason: SURG

## 2019-02-20 RX ORDER — FAMOTIDINE 10 MG/ML
20 INJECTION, SOLUTION INTRAVENOUS ONCE
Status: COMPLETED | OUTPATIENT
Start: 2019-02-20 | End: 2019-02-20

## 2019-02-20 RX ORDER — DIPHENHYDRAMINE HYDROCHLORIDE 50 MG/ML
12.5 INJECTION INTRAMUSCULAR; INTRAVENOUS
Status: DISCONTINUED | OUTPATIENT
Start: 2019-02-20 | End: 2019-02-20 | Stop reason: HOSPADM

## 2019-02-20 RX ORDER — FENTANYL CITRATE 50 UG/ML
50 INJECTION, SOLUTION INTRAMUSCULAR; INTRAVENOUS
Status: DISCONTINUED | OUTPATIENT
Start: 2019-02-20 | End: 2019-02-20 | Stop reason: HOSPADM

## 2019-02-20 RX ORDER — ONDANSETRON 4 MG/1
4 TABLET, FILM COATED ORAL DAILY PRN
Qty: 30 TABLET | Refills: 1 | OUTPATIENT
Start: 2019-02-20

## 2019-02-20 RX ORDER — MIDAZOLAM HYDROCHLORIDE 1 MG/ML
1 INJECTION INTRAMUSCULAR; INTRAVENOUS
Status: DISCONTINUED | OUTPATIENT
Start: 2019-02-20 | End: 2019-02-20 | Stop reason: HOSPADM

## 2019-02-20 RX ORDER — ISOSULFAN BLUE 50 MG/5ML
INJECTION, SOLUTION SUBCUTANEOUS AS NEEDED
Status: DISCONTINUED | OUTPATIENT
Start: 2019-02-20 | End: 2019-02-20 | Stop reason: HOSPADM

## 2019-02-20 RX ORDER — ONDANSETRON 4 MG/1
4 TABLET, FILM COATED ORAL EVERY 8 HOURS PRN
Qty: 10 TABLET | Refills: 1 | Status: SHIPPED | OUTPATIENT
Start: 2019-02-20 | End: 2019-02-22

## 2019-02-20 RX ORDER — PROMETHAZINE HYDROCHLORIDE 25 MG/ML
12.5 INJECTION, SOLUTION INTRAMUSCULAR; INTRAVENOUS ONCE AS NEEDED
Status: COMPLETED | OUTPATIENT
Start: 2019-02-20 | End: 2019-02-20

## 2019-02-20 RX ORDER — HYDROMORPHONE HYDROCHLORIDE 1 MG/ML
0.5 INJECTION, SOLUTION INTRAMUSCULAR; INTRAVENOUS; SUBCUTANEOUS
Status: DISCONTINUED | OUTPATIENT
Start: 2019-02-20 | End: 2019-02-20 | Stop reason: HOSPADM

## 2019-02-20 RX ORDER — SUCCINYLCHOLINE CHLORIDE 20 MG/ML
INJECTION INTRAMUSCULAR; INTRAVENOUS AS NEEDED
Status: DISCONTINUED | OUTPATIENT
Start: 2019-02-20 | End: 2019-02-20 | Stop reason: SURG

## 2019-02-20 RX ORDER — LIDOCAINE HYDROCHLORIDE 40 MG/ML
SOLUTION TOPICAL AS NEEDED
Status: DISCONTINUED | OUTPATIENT
Start: 2019-02-20 | End: 2019-02-20 | Stop reason: SURG

## 2019-02-20 RX ORDER — LIDOCAINE AND PRILOCAINE 25; 25 MG/G; MG/G
CREAM TOPICAL ONCE
Status: COMPLETED | OUTPATIENT
Start: 2019-02-20 | End: 2019-02-20

## 2019-02-20 RX ORDER — PROMETHAZINE HYDROCHLORIDE 25 MG/1
25 TABLET ORAL ONCE AS NEEDED
Status: COMPLETED | OUTPATIENT
Start: 2019-02-20 | End: 2019-02-20

## 2019-02-20 RX ORDER — SODIUM CHLORIDE, SODIUM LACTATE, POTASSIUM CHLORIDE, CALCIUM CHLORIDE 600; 310; 30; 20 MG/100ML; MG/100ML; MG/100ML; MG/100ML
9 INJECTION, SOLUTION INTRAVENOUS CONTINUOUS
Status: DISCONTINUED | OUTPATIENT
Start: 2019-02-20 | End: 2019-02-20 | Stop reason: HOSPADM

## 2019-02-20 RX ORDER — SODIUM CHLORIDE 0.9 % (FLUSH) 0.9 %
1-10 SYRINGE (ML) INJECTION AS NEEDED
Status: DISCONTINUED | OUTPATIENT
Start: 2019-02-20 | End: 2019-02-20 | Stop reason: HOSPADM

## 2019-02-20 RX ORDER — HYDROCODONE BITARTRATE AND ACETAMINOPHEN 7.5; 325 MG/1; MG/1
1 TABLET ORAL ONCE AS NEEDED
Status: DISCONTINUED | OUTPATIENT
Start: 2019-02-20 | End: 2019-02-20 | Stop reason: HOSPADM

## 2019-02-20 RX ORDER — PROMETHAZINE HYDROCHLORIDE 25 MG/1
25 SUPPOSITORY RECTAL ONCE AS NEEDED
Status: COMPLETED | OUTPATIENT
Start: 2019-02-20 | End: 2019-02-20

## 2019-02-20 RX ORDER — LABETALOL HYDROCHLORIDE 5 MG/ML
5 INJECTION, SOLUTION INTRAVENOUS
Status: DISCONTINUED | OUTPATIENT
Start: 2019-02-20 | End: 2019-02-20 | Stop reason: HOSPADM

## 2019-02-20 RX ORDER — FENTANYL CITRATE 50 UG/ML
INJECTION, SOLUTION INTRAMUSCULAR; INTRAVENOUS AS NEEDED
Status: DISCONTINUED | OUTPATIENT
Start: 2019-02-20 | End: 2019-02-20 | Stop reason: SURG

## 2019-02-20 RX ORDER — DIAZEPAM 5 MG/1
10 TABLET ORAL ONCE
Status: COMPLETED | OUTPATIENT
Start: 2019-02-20 | End: 2019-02-20

## 2019-02-20 RX ORDER — DIPHENHYDRAMINE HCL 25 MG
25 CAPSULE ORAL
Status: DISCONTINUED | OUTPATIENT
Start: 2019-02-20 | End: 2019-02-20 | Stop reason: HOSPADM

## 2019-02-20 RX ORDER — CEFAZOLIN SODIUM 2 G/100ML
2 INJECTION, SOLUTION INTRAVENOUS ONCE
Status: COMPLETED | OUTPATIENT
Start: 2019-02-20 | End: 2019-02-20

## 2019-02-20 RX ORDER — PROPOFOL 10 MG/ML
VIAL (ML) INTRAVENOUS AS NEEDED
Status: DISCONTINUED | OUTPATIENT
Start: 2019-02-20 | End: 2019-02-20 | Stop reason: SURG

## 2019-02-20 RX ORDER — EPHEDRINE SULFATE 50 MG/ML
5 INJECTION, SOLUTION INTRAVENOUS ONCE AS NEEDED
Status: DISCONTINUED | OUTPATIENT
Start: 2019-02-20 | End: 2019-02-20 | Stop reason: HOSPADM

## 2019-02-20 RX ORDER — FLUMAZENIL 0.1 MG/ML
0.2 INJECTION INTRAVENOUS AS NEEDED
Status: DISCONTINUED | OUTPATIENT
Start: 2019-02-20 | End: 2019-02-20 | Stop reason: HOSPADM

## 2019-02-20 RX ORDER — ACETAMINOPHEN 325 MG/1
650 TABLET ORAL ONCE AS NEEDED
Status: DISCONTINUED | OUTPATIENT
Start: 2019-02-20 | End: 2019-02-20 | Stop reason: HOSPADM

## 2019-02-20 RX ORDER — ACETAMINOPHEN 500 MG
1000 TABLET ORAL ONCE
Status: COMPLETED | OUTPATIENT
Start: 2019-02-20 | End: 2019-02-20

## 2019-02-20 RX ORDER — ONDANSETRON 2 MG/ML
4 INJECTION INTRAMUSCULAR; INTRAVENOUS ONCE AS NEEDED
Status: COMPLETED | OUTPATIENT
Start: 2019-02-20 | End: 2019-02-20

## 2019-02-20 RX ADMIN — ROCURONIUM BROMIDE 10 MG: 10 INJECTION INTRAVENOUS at 08:19

## 2019-02-20 RX ADMIN — DIAZEPAM 10 MG: 5 TABLET ORAL at 06:14

## 2019-02-20 RX ADMIN — SODIUM CHLORIDE, POTASSIUM CHLORIDE, SODIUM LACTATE AND CALCIUM CHLORIDE 9 ML/HR: 600; 310; 30; 20 INJECTION, SOLUTION INTRAVENOUS at 07:41

## 2019-02-20 RX ADMIN — ONDANSETRON 4 MG: 2 INJECTION INTRAMUSCULAR; INTRAVENOUS at 10:11

## 2019-02-20 RX ADMIN — OXYCODONE HYDROCHLORIDE AND ACETAMINOPHEN 1 TABLET: 7.5; 325 TABLET ORAL at 10:05

## 2019-02-20 RX ADMIN — FAMOTIDINE 20 MG: 10 INJECTION, SOLUTION INTRAVENOUS at 07:40

## 2019-02-20 RX ADMIN — ACETAMINOPHEN 1000 MG: 500 TABLET, FILM COATED ORAL at 06:14

## 2019-02-20 RX ADMIN — CEFAZOLIN SODIUM 2 G: 2 INJECTION, SOLUTION INTRAVENOUS at 08:22

## 2019-02-20 RX ADMIN — DEXAMETHASONE SODIUM PHOSPHATE 6 MG: 10 INJECTION INTRAMUSCULAR; INTRAVENOUS at 08:25

## 2019-02-20 RX ADMIN — FENTANYL CITRATE 50 MCG: 50 INJECTION INTRAMUSCULAR; INTRAVENOUS at 09:40

## 2019-02-20 RX ADMIN — FENTANYL CITRATE 50 MCG: 50 INJECTION INTRAMUSCULAR; INTRAVENOUS at 08:37

## 2019-02-20 RX ADMIN — PROPOFOL 50 MG: 10 INJECTION, EMULSION INTRAVENOUS at 08:37

## 2019-02-20 RX ADMIN — SUCCINYLCHOLINE CHLORIDE 120 MG: 20 INJECTION, SOLUTION INTRAMUSCULAR; INTRAVENOUS; PARENTERAL at 08:19

## 2019-02-20 RX ADMIN — LIDOCAINE AND PRILOCAINE: 25; 25 CREAM TOPICAL at 06:14

## 2019-02-20 RX ADMIN — TECHNETIUM TC 99M SULFUR COLLOID 1 DOSE: KIT at 07:17

## 2019-02-20 RX ADMIN — HYDROMORPHONE HYDROCHLORIDE 0.5 MG: 1 INJECTION, SOLUTION INTRAMUSCULAR; INTRAVENOUS; SUBCUTANEOUS at 10:05

## 2019-02-20 RX ADMIN — FENTANYL CITRATE 100 MCG: 50 INJECTION INTRAMUSCULAR; INTRAVENOUS at 08:19

## 2019-02-20 RX ADMIN — LIDOCAINE HYDROCHLORIDE 1 EACH: 40 SOLUTION TOPICAL at 08:20

## 2019-02-20 RX ADMIN — CELECOXIB 400 MG: 200 CAPSULE ORAL at 06:14

## 2019-02-20 RX ADMIN — PROPOFOL 150 MG: 10 INJECTION, EMULSION INTRAVENOUS at 08:19

## 2019-02-20 RX ADMIN — SODIUM CHLORIDE, POTASSIUM CHLORIDE, SODIUM LACTATE AND CALCIUM CHLORIDE: 600; 310; 30; 20 INJECTION, SOLUTION INTRAVENOUS at 09:45

## 2019-02-20 RX ADMIN — PROMETHAZINE HYDROCHLORIDE 25 MG: 25 TABLET ORAL at 11:29

## 2019-02-20 RX ADMIN — HYDROMORPHONE HYDROCHLORIDE 0.5 MG: 1 INJECTION, SOLUTION INTRAMUSCULAR; INTRAVENOUS; SUBCUTANEOUS at 10:25

## 2019-02-20 RX ADMIN — MIDAZOLAM 1 MG: 1 INJECTION INTRAMUSCULAR; INTRAVENOUS at 08:06

## 2019-02-20 RX ADMIN — SCOPALAMINE 1 PATCH: 1 PATCH, EXTENDED RELEASE TRANSDERMAL at 07:40

## 2019-02-20 RX ADMIN — LIDOCAINE HYDROCHLORIDE 60 MG: 20 INJECTION, SOLUTION INFILTRATION; PERINEURAL at 08:19

## 2019-02-20 NOTE — ANESTHESIA PROCEDURE NOTES
Airway  Urgency: elective    Date/Time: 2/20/2019 8:20 AM  Airway not difficult    General Information and Staff    Patient location during procedure: OR  Anesthesiologist: Mallory Panda MD  CRNA: Enrique Garnica CRNA    Indications and Patient Condition  Indications for airway management: airway protection    Preoxygenated: yes  MILS maintained throughout  Mask difficulty assessment: 1 - vent by mask    Final Airway Details  Final airway type: endotracheal airway      Successful airway: ETT  Cuffed: yes   Successful intubation technique: direct laryngoscopy  Endotracheal tube insertion site: oral  Blade: Renetta  Blade size: 3  ETT size (mm): 7.0  Cormack-Lehane Classification: grade I - full view of glottis  Placement verified by: chest auscultation and capnometry   Measured from: teeth  ETT to teeth (cm): 20  Number of attempts at approach: 1

## 2019-02-20 NOTE — ANESTHESIA POSTPROCEDURE EVALUATION
Patient: SARITHA Eldridge    Procedure Summary     Date:  02/20/19 Room / Location:  [facility] OR 02 / [facility] MAIN OR    Anesthesia Start:  0812 Anesthesia Stop:  0953    Procedure:  BREAST LUMPECTOMY WITH SENTINEL NODE BIOPSY (Left Breast) Diagnosis:       Malignant neoplasm of central portion of left breast in female, estrogen receptor positive (CMS/HCC)      (Malignant neoplasm of central portion of left breast in female, estrogen receptor positive (CMS/HCC) [C50.112, Z17.0])    Surgeon:  Mike Adams MD Provider:  Mallory Panda MD    Anesthesia Type:  general ASA Status:  2          Anesthesia Type: general  Last vitals  BP   140/70 (02/20/19 1005)   Temp   36.6 °C (97.9 °F) (02/20/19 0604)   Pulse   101 (02/20/19 1005)   Resp   14 (02/20/19 1005)     SpO2   99 % (02/20/19 1005)     Post Anesthesia Care and Evaluation    Patient location during evaluation: PACU  Patient participation: complete - patient participated  Level of consciousness: awake and alert  Pain management: adequate  Airway patency: patent  Anesthetic complications: No anesthetic complications    Cardiovascular status: acceptable  Respiratory status: acceptable  Hydration status: acceptable    Comments: --------------------            02/20/19               1005     --------------------   BP:       140/70     Pulse:     101       Resp:       14       Temp:                SpO2:      99%      --------------------

## 2019-02-20 NOTE — OP NOTE
Operative Note  - Lumpectomy, SN biopsy      SARITHA Eldridge  58 y.o.  1960  female  [id number]    2/20/2019    Pre-operative Diagnosis:   left breast cancer    Post-operative Diagnosis: same    Procedure:  left Lumpectomy and sentinel node biopsy      Surgeon: Mike Adams MD    Assistant:  none    Anesthesia: General        Findings : There were no unexpected findings    Indication :  The patient palpated a lump at the base of the nipple of the left breast.  This was excised and revealed invasive ductal cancer.  The patient prefers breast conserving surgery which will involve a central lumpectomy and sentinel lymph node biopsy.    Description of procedure:  In the radiology department the patient was given an injection of radioactive sulfur colloid in the periareolar skin.      The patient was taken to the operating room and given general anesthesia.  She was placed in the Prone position.  Prepping and draping were performed in the usual sterile fashion.  3 cc of blue dye were then injected intradermally.     30 cc Marcaine 0.5 % was injected into the skin at the lumpectomy site and in the axilla.   An incision was made in the left axilla.  Dissection was carried down into the clavipectoral fascia where 2 deep radioactive nodes were identified and removed.  Radioactivity counts were 560 and 64 respectively.  Blue dye was found in the first sentinel node.. Frozen section was not performed. No other nodes with counts over 56 were found.  No other blue nodes or palpably abnormal nodes were found.The skin was closed in layers with 3-0 Vicryl suture for the deeper layers and a running 4-0 Vicryl subcuticular stitch for the skin..      An elliptical incision was made around the nipple areolar complex.  Dissection was carried down with  grossly clear margins.  Bleeding points were controlled with the electrocautery unit.  Care was taken to avoid injuring the underlying implant.  The skin was closed in layers  with interrupted 3-0 Vicryl sutures for the deeper layers and a running 4-0 Vicryl subcuticular stitch for the skin.  Steri-Strips were placed across the wound..  Sterile dressings were also applied.     She tolerated the procedure well and was taken to the recovery room in stable condition.    Specimens:     Order Name Source Comment Collection Info Order Time   TISSUE PATHOLOGY EXAM Breast, Left  Collected By: Mike Adams MD 2/20/2019  8:46 AM       Estimated Blood Loss:Minimal    Condition:  good   The assistant was present from the start of the case until the last stitch was placed.  The assistant helped with providing exposure, and placing ties when appropriate.    Signature: Mike Adams MD          CC: Irineo Laguerre MD

## 2019-02-20 NOTE — PERIOPERATIVE NURSING NOTE
Spoke with Dr. Adams's nurse in OR 2 (where he is currently doing a procedure); he will send something in to her [facility] pharmacy for nausea after he completes current surgery.

## 2019-02-20 NOTE — ANESTHESIA PREPROCEDURE EVALUATION
Anesthesia Evaluation     Patient summary reviewed and Nursing notes reviewed   NPO Solid Status: > 8 hours  NPO Liquid Status: > 2 hours           Airway   Mallampati: II  no difficulty expected  Comment: Poor opening  Dental - normal exam     Pulmonary     breath sounds clear to auscultation  (+) a smoker Former,   Cardiovascular     ECG reviewed  Rhythm: regular  Rate: normal        Neuro/Psych  (+) numbness,     GI/Hepatic/Renal/Endo      Musculoskeletal     Abdominal    Substance History      OB/GYN          Other   (+) arthritis   history of cancer                    Anesthesia Plan    ASA 2     general     intravenous induction   Anesthetic plan, all risks, benefits, and alternatives have been provided, discussed and informed consent has been obtained with: patient.

## 2019-02-20 NOTE — H&P
History of Present Illness    The pathology report from her recent breast biopsy has returned revealing an 8 mm invasive ductal cancer with positive margins.  The tumor was ER positive, WA positive, and HER-2 negative.  The Ki-67 was 11%.        Review of Systems  Medical History        Past Medical History:   Diagnosis Date   • Breast lump       LEFT   • IBS (irritable bowel syndrome)     • Mild scoliosis           Surgical History         Past Surgical History:   Procedure Laterality Date   • BACK SURGERY   07/2018   • BREAST AUGMENTATION       • BREAST BIOPSY Left 12/20/2018     Procedure: BREAST BIOPSY;  Surgeon: Mike Adams MD;  Location: [facility];  Service: General   • BUNIONECTOMY Bilateral     • CYSTOCELE REPAIR       • HERNIA REPAIR         X2   • HYSTERECTOMY                   Family History   Problem Relation Age of Onset   • Breast cancer Mother     • Esophageal cancer Father     • Malig Hyperthermia Neg Hx                         Objective      Physical Exam  Gen.-average weight white female in no acute distress  Vital signs-blood pressure 140/70, pulse 77, temperature 97.9  Left breast-she has a healing incision around the base of the nipple.  I do not see any signs of infection and there is no wound drainage.  Heart-regular rate and rhythm  Lungs-clear to auscultation    Assessment/Plan   The patient prefers breast conserving surgery which in this case will require a central lumpectomy.  We also plan to perform a sentinel lymph node biopsy.  She understands the small risk of her return to surgery for positive margins for further lymph node surgery.  She also understands the risk of infection, bleeding, and an anesthetic complication.

## 2019-02-20 NOTE — DISCHARGE INSTRUCTIONS
**Last Percocet pain pill given at 10:05 am.**            Scopolamine Patch  This patch has been applied to the skin behind one of your ears.  It may stay in place up to 24 hours. You may remove it at any time after your surgery; however, it should be removed after you are up and walking around the next day.  This medicine reduces stomach upset. Side effects may include: dry mouth, dizziness, sleepiness, constipation, or upset stomach.  An allergy would show up as: a rash, itching, wheezing or shortness of breath.  Follow these instructions:  1. Do not drink alcohol, drive or operate machinery while taking this medicine.  2. Wear only 1 patch at a time. You can leave the patch on for up to 24 hours.  3. When you remove the patch, fold it in half with the sticky sides together and throw it away. Wash your hands and the area under the patch.  4. Do not touch your eye with your hand if it has touched the patch.  5. Wash your hands well before and after touching the patch.  6. Sit or stand slowly to avoid dizziness.  Call your doctor if you have:  1. Any sign of allergy  2. No relief  3. Trouble passing urine  4. Any new or severe symptoms    **Please remove the patch behind your ear tonight or tomorrow.**

## 2019-02-21 DIAGNOSIS — C50.112 MALIGNANT NEOPLASM OF CENTRAL PORTION OF LEFT BREAST IN FEMALE, ESTROGEN RECEPTOR POSITIVE (HCC): Primary | ICD-10-CM

## 2019-02-21 DIAGNOSIS — Z17.0 MALIGNANT NEOPLASM OF CENTRAL PORTION OF LEFT BREAST IN FEMALE, ESTROGEN RECEPTOR POSITIVE (HCC): Primary | ICD-10-CM

## 2019-02-22 ENCOUNTER — TELEPHONE (OUTPATIENT)
Dept: MAMMOGRAPHY | Facility: CLINIC | Age: 59
End: 2019-02-22

## 2019-03-01 ENCOUNTER — OFFICE VISIT (OUTPATIENT)
Dept: MAMMOGRAPHY | Facility: CLINIC | Age: 59
End: 2019-03-01

## 2019-03-01 VITALS
HEART RATE: 80 BPM | SYSTOLIC BLOOD PRESSURE: 140 MMHG | TEMPERATURE: 98.5 F | OXYGEN SATURATION: 97 % | DIASTOLIC BLOOD PRESSURE: 80 MMHG

## 2019-03-01 DIAGNOSIS — C50.112 MALIGNANT NEOPLASM OF CENTRAL PORTION OF LEFT BREAST IN FEMALE, ESTROGEN RECEPTOR POSITIVE (HCC): Primary | ICD-10-CM

## 2019-03-01 DIAGNOSIS — Z17.0 MALIGNANT NEOPLASM OF CENTRAL PORTION OF LEFT BREAST IN FEMALE, ESTROGEN RECEPTOR POSITIVE (HCC): Primary | ICD-10-CM

## 2019-03-01 PROCEDURE — 99024 POSTOP FOLLOW-UP VISIT: CPT | Performed by: SURGERY

## 2019-03-01 NOTE — PROGRESS NOTES
Chief Complaint: SARITHA Eldridge is a  58 y.o. female, initially referred by No ref. provider found , who is here today for a postoperative visit.    History of Present Illness:  In the interim,SARITHA Eldridge has had the following procedure and resultant pathology report: Central lumpectomy of the left breast.  The path report showed a 1.6 cm tumor with clear margins.  The sentinel lymph nodes were also benign.    She has noted no redness, warmth,drainage, swelling at the incision site. Denies fever or chills.  She has been very active and has noted some puffiness in the left axilla.      Current Outpatient Medications:   •  coenzyme Q10 100 MG capsule, Take 200 mg by mouth Daily., Disp: , Rfl:   •  dicyclomine (BENTYL) 20 MG tablet, Take 20 mg by mouth Every 6 (Six) Hours As Needed., Disp: , Rfl:   •  Emollient (COLLAGEN EX), Take 6 capsules by mouth Daily., Disp: , Rfl:   •  Flaxseed, Linseed, (FLAXSEED OIL) 1200 MG capsule, Take 1,200 mg by mouth Daily., Disp: , Rfl:   •  fluticasone (FLONASE) 50 MCG/ACT nasal spray, 2 sprays by Each Nare route Daily., Disp: , Rfl:   •  HYDROcodone-acetaminophen (NORCO) 5-325 MG per tablet, Take 1-2 tablets by mouth Every 4 (Four) Hours As Needed (Pain)., Disp: 6 tablet, Rfl: 0  •  Milk Thistle 1000 MG capsule, Take 240 mg by mouth Daily., Disp: , Rfl:   •  Multiple Vitamins-Minerals (MULTIVITAMIN WITH MINERALS) tablet tablet, Take 1 tablet by mouth Daily., Disp: , Rfl:   •  tiZANidine (ZANAFLEX) 4 MG tablet, Take 2 mg by mouth 2 (Two) Times a Day As Needed for Muscle Spasms., Disp: , Rfl:   Physical examination  Left breast-nipple areolar complex has been removed.  The Steri-Strips are still across the incision and I see no signs of infection or drainage.  The Steri-Strips have come off of the axillary incision and there is some adjacent swelling of the tissues but nothing that seems out of the ordinary.  Assessment:  Left breast cancer status post central lumpectomy with  sentinel lymph node biopsy.  She appears to be healing well.  Appointments have been made for her to see the radiation and medical oncologist.    Plan:  I will see her in the office in 2 months.          EMR Dragon/transcription disclaimer:    Much of this encounter note is an electronic transcription/translocation of spoken language to printed text.  The electronic translation of spoken language may permit erroneous, or at times, nonsensical words or phrases to be inadvertently transcribed.  Although I have reviewed the note from such areas, some may still exist.

## 2019-03-04 ENCOUNTER — LAB (OUTPATIENT)
Dept: LAB | Facility: HOSPITAL | Age: 59
End: 2019-03-04

## 2019-03-04 ENCOUNTER — CONSULT (OUTPATIENT)
Dept: ONCOLOGY | Facility: CLINIC | Age: 59
End: 2019-03-04

## 2019-03-04 ENCOUNTER — CLINICAL SUPPORT (OUTPATIENT)
Dept: ONCOLOGY | Facility: CLINIC | Age: 59
End: 2019-03-04

## 2019-03-04 VITALS
SYSTOLIC BLOOD PRESSURE: 151 MMHG | HEIGHT: 64 IN | WEIGHT: 137 LBS | RESPIRATION RATE: 12 BRPM | DIASTOLIC BLOOD PRESSURE: 89 MMHG | BODY MASS INDEX: 23.39 KG/M2 | OXYGEN SATURATION: 99 % | HEART RATE: 77 BPM | TEMPERATURE: 98.4 F

## 2019-03-04 DIAGNOSIS — Z17.0 MALIGNANT NEOPLASM OF CENTRAL PORTION OF LEFT BREAST IN FEMALE, ESTROGEN RECEPTOR POSITIVE (HCC): Primary | ICD-10-CM

## 2019-03-04 DIAGNOSIS — C50.112 MALIGNANT NEOPLASM OF CENTRAL PORTION OF LEFT BREAST IN FEMALE, ESTROGEN RECEPTOR POSITIVE (HCC): Primary | ICD-10-CM

## 2019-03-04 LAB
BASOPHILS # BLD AUTO: 0.03 10*3/MM3 (ref 0–0.2)
BASOPHILS NFR BLD AUTO: 0.4 % (ref 0–1.5)
DEPRECATED RDW RBC AUTO: 41.4 FL (ref 37–54)
EOSINOPHIL # BLD AUTO: 0.05 10*3/MM3 (ref 0–0.4)
EOSINOPHIL NFR BLD AUTO: 0.7 % (ref 0.3–6.2)
ERYTHROCYTE [DISTWIDTH] IN BLOOD BY AUTOMATED COUNT: 11.9 % (ref 12.3–15.4)
HCT VFR BLD AUTO: 44.2 % (ref 34–46.6)
HGB BLD-MCNC: 15.4 G/DL (ref 12–15.9)
IMM GRANULOCYTES # BLD AUTO: 0.05 10*3/MM3 (ref 0–0.05)
IMM GRANULOCYTES NFR BLD AUTO: 0.7 % (ref 0–0.5)
LYMPHOCYTES # BLD AUTO: 1.48 10*3/MM3 (ref 0.7–3.1)
LYMPHOCYTES NFR BLD AUTO: 22 % (ref 19.6–45.3)
MCH RBC QN AUTO: 32.8 PG (ref 26.6–33)
MCHC RBC AUTO-ENTMCNC: 34.8 G/DL (ref 31.5–35.7)
MCV RBC AUTO: 94 FL (ref 79–97)
MONOCYTES # BLD AUTO: 0.62 10*3/MM3 (ref 0.1–0.9)
MONOCYTES NFR BLD AUTO: 9.2 % (ref 5–12)
NEUTROPHILS # BLD AUTO: 4.5 10*3/MM3 (ref 1.4–7)
NEUTROPHILS NFR BLD AUTO: 67 % (ref 42.7–76)
NRBC BLD AUTO-RTO: 0 /100 WBC (ref 0–0)
PLATELET # BLD AUTO: 193 10*3/MM3 (ref 140–450)
PMV BLD AUTO: 9.2 FL (ref 6–12)
RBC # BLD AUTO: 4.7 10*6/MM3 (ref 3.77–5.28)
WBC NRBC COR # BLD: 6.73 10*3/MM3 (ref 3.4–10.8)

## 2019-03-04 PROCEDURE — 99245 OFF/OP CONSLTJ NEW/EST HI 55: CPT | Performed by: INTERNAL MEDICINE

## 2019-03-04 PROCEDURE — 85025 COMPLETE CBC W/AUTO DIFF WBC: CPT | Performed by: INTERNAL MEDICINE

## 2019-03-04 PROCEDURE — 36416 COLLJ CAPILLARY BLOOD SPEC: CPT | Performed by: INTERNAL MEDICINE

## 2019-03-04 NOTE — PROGRESS NOTES
Subjective     REASON FOR CONSULTATION:  Left breast cancer 15 mm in size, ductal invasive, grade II,small lobular component, and dcis, sp/ lumpectomy, negative sentinel lymph nodes, Er + . Pr positive her 2 negative by IHC  Provide an opinion on any further workup or treatment                             REQUESTING PHYSICIAN:  DR WANG PIERSON MD, DR EVERARDO ADAMS MD    RECORDS OBTAINED:  Records of the patients history including those obtained from the referring provider were reviewed and summarized in detail.          History of Present Illness I have been asked to see this patient in consultation today by Willian Adams MD after she has undergone a left breast lumpectomy in the background of abnormal mammogram that has been an ongoing issue since first of 2018. On that occasion she was seen by her obstetrician/gynecologist and a mammogram and ultrasound were on a couple of occasions negative benign. The patient was reviewed again by Dr. Adams by the end of 2018 and given the fact that the patient was having minimal nipple retraction, it was decided to go ahead and proceed with a biopsy that documented ductal carcinoma invasive with lobular component and DCIS. Since then the patient has undergone a lumpectomy and sentinel lymph node sampling. The patient is known for having breast implants that have been present for at least 10 or 12 years. These structures were not touched or modified during the recent surgical time. In any event, the patient has healed well from surgery. She has some leftover pain in the left axilla and minimal numbness but otherwise good rotation and range of motion for her left upper extremity. Her appetite has been normal. Her bowel function has been normal. Urination has been normal. She has not had any cardiovascular or respiratory issues of any kind and no bone pain or joint pain. She has had chronic back pain now for almost 2 years related to degenerative disease and previous  surgery. This pain is no different in character or timing. The patient denies any neurological symptomatology. Otherwise, she is in excellent health.         Past Medical History:   Diagnosis Date   • Arthritis     BACK   • Breast lump     LEFT   • Cancer (CMS/HCC) 02/2019    BREAST LEFT   • Hip pain, left    • History of back pain    • History of colon polyp    • History of osteopenia    • IBS (irritable bowel syndrome)    • Mild scoliosis    • Numbness in feet     LEFT SECOND TOE, GETTING INJECTIONS FOR THIS   • Seasonal allergies         Past Surgical History:   Procedure Laterality Date   • ANTERIOR AND POSTERIOR VAGINAL REPAIR  04/2010   • BREAST AUGMENTATION Bilateral 2015   • BREAST BIOPSY Left 12/20/2018    Procedure: BREAST BIOPSY;  Surgeon: Mike Adams MD;  Location: [facility];  Service: General   • BREAST LUMPECTOMY WITH SENTINEL NODE BIOPSY Left 2/20/2019    Procedure: BREAST LUMPECTOMY WITH SENTINEL NODE BIOPSY;  Surgeon: Mike Adams MD;  Location: [facility] OR;  Service: General   • BUNIONECTOMY Bilateral     RIGHT       LEFT 2-2015   • HERNIA REPAIR Left 03/1994    INGUINAL    • HERNIA REPAIR Left 04/1996    FEMORAL   • HYSTERECTOMY  02/2001   • LUMBAR FUSION  07/11/2018    L 4-5        Current Outpatient Medications on File Prior to Visit   Medication Sig Dispense Refill   • coenzyme Q10 100 MG capsule Take 200 mg by mouth Daily.     • dicyclomine (BENTYL) 20 MG tablet Take 20 mg by mouth Every 6 (Six) Hours As Needed.     • Emollient (COLLAGEN EX) Take 6 capsules by mouth Daily.     • Flaxseed, Linseed, (FLAXSEED OIL) 1200 MG capsule Take 1,200 mg by mouth Daily.     • fluticasone (FLONASE) 50 MCG/ACT nasal spray 2 sprays by Each Nare route Daily.     • HYDROcodone-acetaminophen (NORCO) 5-325 MG per tablet Take 1-2 tablets by mouth Every 4 (Four) Hours As Needed (Pain). 6 tablet 0   • Milk Thistle 1000 MG capsule Take 240 mg by mouth Daily.     • Multiple  Vitamins-Minerals (MULTIVITAMIN WITH MINERALS) tablet tablet Take 1 tablet by mouth Daily.     • tiZANidine (ZANAFLEX) 4 MG tablet Take 2 mg by mouth 2 (Two) Times a Day As Needed for Muscle Spasms.     • Unable to find 1 each 1 (One) Time. Med Name: sclerosing injections  7 injections every other week  1st injection was 18       No current facility-administered medications on file prior to visit.         ALLERGIES:  No Known Allergies     Social History     Socioeconomic History   • Marital status:      Spouse name: Tommy   • Number of children: 4   • Years of education: Not on file   • Highest education level: Not on file   Social Needs   • Financial resource strain: Not hard at all   • Food insecurity - worry: Never true   • Food insecurity - inability: Never true   • Transportation needs - medical: No   • Transportation needs - non-medical: No   Occupational History     Employer: RETIRED   Tobacco Use   • Smoking status: Former Smoker     Packs/day: 0.50     Years: 10.00     Pack years: 5.00     Types: Cigarettes     Last attempt to quit:      Years since quittin.1   • Smokeless tobacco: Never Used   • Tobacco comment: QUIT 20-30 YEARS AGO   Substance and Sexual Activity   • Alcohol use: Yes     Alcohol/week: 4.2 - 8.4 oz     Types: 7 - 14 Glasses of wine per week   • Drug use: No   • Sexual activity: Defer        Family History   Problem Relation Age of Onset   • Breast cancer Mother 70        had it twice   • Esophageal cancer Father 80   • Leukemia Brother    • Colon cancer Brother    • Malig Hyperthermia Neg Hx         Review of Systems   General: no fever, no chills, no fatigue,no weight changes, no lack of appetite.  Eyes: no epiphora, xerophthalmia,conjunctivitis, pain, glaucoma, blurred vision, blindness, secretion, photophobia, proptosis, diplopia.  Ears: no otorrhea, tinnitus, otorrhagia, deafness, pain, vertigo.  Nose: no rhinorrhea, no epistaxis, no alteration in perception of  "odors, no sinuses pressure.  Mouth: no alteration in gums or teeth,  No ulcers, no difficulty with mastication or deglut ion, no odynophagia.  Neck: no masses or pain, no thyroid alterations, no pain in muscles or arteries, no carotid odynia, no crepitation.  Respiratory: no cough, no sputum production,no dyspnea,no trepopnea, no pleuritic pain,no hemoptysis.  Heart: no syncope, no irregularity, no palpitations, no angina,no orthopnea,no paroxysmal nocturnal dyspnea.  Vascular Venous: no tenderness,no edema,no palpable cords,no postphlebitic syndrome, no skin changes no ulcerations.  Vascular Arterial: no distal ischemia, noclaudication, no gangrene, no neuropathic ischemic pain, no skin ulcers, no paleness no cyanosis.  GI: no dysphagia, no odynophagia, no regurgitation, no heartburn,no indigestion,no nausea,no vomiting,no hematemesis ,no melena,no jaundice,no distention, no obstipation,no enterorrhagia,no proctalgia,no anal  lesions, no changes in bowel habits.  : no frequency, no hesitancy, no hematuria, no discharge,no  pain.  Musculoskeletal: no muscle or tendon pain or inflammation,no  joint pain, no edema, no functional limitation,no fasciculations, no mass.  Neurologic: no headache, no seizures, noalterations on Craneal nerves, no motor deficit, no sensory deficit, normal coordination, no alteration in memory,normal orientation, calculation,normal writting, verbal and written language.  Skin: no rashes,no pruritus no localized lesions.  Psychiatric: no anxiety, no depression,no agitation, no delusions, proper insight.      Objective     Vitals:    03/04/19 1220   BP: 151/89   Pulse: 77   Resp: 12   Temp: 98.4 °F (36.9 °C)   TempSrc: Oral   SpO2: 99%   Weight: 62.1 kg (137 lb)   Height: 162 cm (63.78\")   PainSc: 0-No pain     Current Status 3/4/2019   ECOG score 0       Physical Exam    GENERAL:  Well-developed, well-nourished  Patient  in no acute distress.   SKIN:  Warm, dry ,NO rashes,NO purpura ,NO " petechiae.  HEENT:  Pupils were equal and reactive to light and accomodation, conjunctivas non injected, no pterigion, normal extraocular movements, normal visual acuity.   Mouth mucosa was moist, no exudates in oropharynx, normal gum line, normal roof of the mouth and pillars, normal papillations of the tongue.  NECK:  Supple with good range of motion; no thyromegaly or masses, no JVD or bruits, no cervical adenopathies.No carotid arteries pain, no carotid abnormal pulsation , NO arterial dance.  LYMPHATICS:  No cervical, NO supraclavicular, NO axillary,NO epitrochlear , NO inguinal adenopathy.  CHEST:  Normal excursion of both yung thoraces, normal voice fremitus, no subcutaneous emphysema, normal axillas, no rashes or acanthosis nigricans. Lungs clear to percussion and auscultation, normal breath sounds bilaterally, no wheezing,NO crackles NO ronchi, NO stridor, NO rubs.  CARDIAC AND VASCULAR:  normal rate and regular rhythm, without murmurs,NO rubs NO S3 NO S4 right or left . Normal femoral, popliteal, pedis, brachial and carotid pulses.  INSPECTION of  breast documented symmetry of the tissue per se and location and size of the nipple,no retractions or inversion of the nipple, normal skin without lesions, no erythema or nodules,no paud'orange, no prominence of superficial veins or chest wall collateral circulation.PALPATION of the breast documented normal skin turgor, no induration, alteration in local temperature, or pain, no palpable masses or nodules, normal mobility of the tissues,no fixation of the tissue or parenchyma to the chest wall, no alteration at the tail of the breasts or axillas, no adenopathies. LEFT BREAST Surgical site was well healed.No lymphedema in either extremity.BILATERAL BREAST IMPLANTS  ABDOMEN:  Soft, nontender with no organomegaly or masses, no ascites, no collateral circulation,no distention,no [place] sign, no abdominal pain, no inguinal hernias,no umbilical hernia, no abdominal  bruits. Normal bowel sounds.  GENITAL: Not  Performed.  EXTREMITIES  AND SPINE:  No clubbing, cyanosis or edema, no deformities or pain .No kyphosis, scoliosis, deformities or pain in spine, ribs or pelvic bone.  NEUROLOGICAL:  Patient was awake, alert, oriented to time, person and place.        RECENT LABS:  Hematology WBC   Date Value Ref Range Status   03/04/2019 6.73 3.40 - 10.80 10*3/mm3 Final     RBC   Date Value Ref Range Status   03/04/2019 4.70 3.77 - 5.28 10*6/mm3 Final     Hemoglobin   Date Value Ref Range Status   03/04/2019 15.4 12.0 - 15.9 g/dL Final     Hematocrit   Date Value Ref Range Status   03/04/2019 44.2 34.0 - 46.6 % Final     Platelets   Date Value Ref Range Status   03/04/2019 193 140 - 450 10*3/mm3 Final      File Link     Scan on 2/11/2019  3:17 PM by Nicki Robert: GENETIC ADD ON RESULTS WITH F/U NOTE [id number] on 2/11/2019  3:17 PM by Nicki Robert: GENETIC ADD ON RESULTS WITH F/U NOTE 1-   Key Information     Document ID File Type Document Type Description   [id number] Image GENETICS - SCAN GENETIC ADD ON RESULTS WITH F/U NOTE 1-   Import Information     Attached At Date Time User Dept   Encounter Level 2/11/2019  3:17 PM [facility]   Encounter     Clinical Support on 1/29/19 with GENETIC CLINIC     Tissue Pathology Exam: [id number]   Order: [id number]   Collected:  2/20/2019 08:45   Status:  Final result   Visible to patient:  Yes (MyChart)   Dx:  Malignant neoplasm of central portion...   Component    Final Diagnosis   1. Breast, Left, Lumpectomy (24 grams):               A. Invasive mammary carcinoma of no special type (ductal) with focal lobular features, measuring 16 mm                   maximally, Ambika Histologic grade 2 (tubules = 2, nuclei = 3, mitoses = 1) identified adjacent to biopsy                   site changes.  All margins of excision are free of invasive tumor. The invasive tumor has the following                    measurements to the margin: posterior 17 mm, superior 9 mm, inferior 3 mm, medial 20 mm, lateral 15 mm.               B. A minimal associated component of ductal carcinoma in situ (DCIS), 5mm, identified with solid and cribriform                   architecture, apocrine features and associated microcalcifications.  The margin is free of in situ tumor.  In                   situ tumor has the following measurements to the margin: posterior 5 mm, superior 10 mm, inferior 17 mm,                   medial 20 mm, lateral 25 mm.               C. Atypical lobular hyperplasia.                D. See synoptic template below.     2. Lymph Node, Left [place] #1, Excision:               A. Benign lymph node (0/1).     3. Lymph Node, Left [place] #2, Excision:               A. Benign reactive lymph node (0/1).     mec/pkm    Electronically signed by Dimitry Morrison MD on 2/22/2019 at 0857   Synoptic Checklist   INVASIVE CARCINOMA OF THE BREAST   Breast Invasive - All Specimens   CLINICAL   Clinical History  Palpable mass    SPECIMEN   Procedure  Excision (less than total mastectomy)    Specimen Laterality  Left    TUMOR   Tumor Site: Invasive Carcinoma  Central      Nipple    Histologic Type  Invasive carcinoma of no special type (ductal, not otherwise specified)    Glandular (Acinar) / Tubular Differentiation  Score 2    Nuclear Pleomorphism  Score 3    Mitotic Rate  Score 1 (<=3 mitoses per mm2)    Number of Mitoses per 10 High-Power Fields  4 mitotic figures   Diameter of Microscope Field in Millimeters (mm)  0.55 Millimeters (mm)   Overall Grade  Grade 2 (scores of 6 or 7)    Tumor Size  Greatest dimension of largest invasive focus in Millimeters (mm): 16 Millimeters (mm)   Additional Dimension in Millimeters (mm)  10 Millimeters (mm)     5 Millimeters (mm)   Tumor Focality  Single focus of invasive carcinoma    Ductal Carcinoma In Situ (DCIS)  Present      Negative for extensive intraductal component (EIC)    Size (Extent)  of DCIS  Estimated size of DCIS greatest dimension in Millimeters (mm) is at least: 5 Millimeters (mm)   Architectural Patterns  Cribriform      Solid    Nuclear Grade  Grade II (intermediate)    Necrosis  Not identified    Lobular Carcinoma In Situ (LCIS)  No LCIS in specimen    Tumor Extent     Skin Invasion  Invasive carcinoma directly invades into the dermis or epidermis without skin ulceration (this does not change the T stage)    Nipple DCIS  DCIS does not involve the nipple epidermis    Accessory Findings     Lymphovascular Invasion  Not identified    Dermal Lymphovascular Invasion  Not identified    Microcalcifications  Present in DCIS      Present in non-neoplastic tissue    Treatment Effect  No known presurgical therapy    MARGINS   Invasive Carcinoma Margins  Uninvolved by invasive carcinoma    Distance from Posterior Margin in Millimeters (mm)  17 Millimeters (mm)   Distance from Superior Margin in Millimeters (mm)  9 Millimeters (mm)   Distance from Inferior Margin in Millimeters (mm)  3 Millimeters (mm)   Distance from Medial Margin in Millimeters (mm)  20 Millimeters (mm)   Distance from Lateral Margin in Millimeters (mm)  15 Millimeters (mm)   Distance from Closest Margin in Millimeters (mm)  3 Millimeters (mm)   Closest Margin  Inferior    DCIS Margins  Uninvolved by DCIS    Distance of DCIS to Posterior Margin in Millimeters (mm)  5 Millimeters (mm)   Distance of DCIS to Superior Margin in Millimeters (mm)  10 Millimeters (mm)   Distance of DCIS to Inferior Margin in Millimeters (mm)  17 Millimeters (mm)   Distance of DCIS to Medial Margin in Millimeters (mm)  20 Millimeters (mm)   Distance of DCIS to Lateral Margin in Millimeters (mm)  25 Millimeters (mm)   Distance of DCIS from Closest Margin in Millimeters (mm)  5 Millimeters (mm)   Closest Margin  Posterior    LYMPH NODES   Regional Lymph Nodes  Uninvolved by tumor cells    Number of Lymph Nodes Examined  2    Number of [place] Nodes Examined   2    PATHOLOGIC STAGE CLASSIFICATION (pTNM, AJCC 8th Edition)   TNM Descriptors  Not applicable    Primary Tumor (Invasive Carcinoma) (pT)  pT1c    Regional Lymph Nodes (pN)     Modifier  (sn): Only sentinel node(s) evaluated.    Category (pN)  pN0    ADDITIONAL FINDINGS   Additional Pathologic Findings  Atypical lobular hyperplasia    .      Comment    The patient's prior core biopsy is on file with this department and is pulled and reviewed ([id number]) disclosing an infiltrating carcinoma of no special type with lobular features.  The tumor per report was ER strongly positive %, TX strongly positive %, HER2 IHC negative (score 1+) and had a Ki-67 of 11%.  Please see that complete report for further details.  Representative sections from this case are shared in internal consultation with Dr. Thompson who concurs.     [facility]              BILATERAL BREAST MRI WITH AND WITHOUT CONTRAST     HISTORY: 58-year-old female with newly diagnosed carcinoma in the left  breast. Initial staging.     TECHNIQUE:  Multiple axial and sagittal T2-weighted images and Vibrant  T1-weighted images were acquired, including T1-weighted images obtained  after intravenous injection of MultiHance contrast.  The "[employer] workstation was utilized during image review aid in analysis  of contrast enhancement kinetics.     COMPARISON: Mammogram and left breast ultrasound dated 10/25/2017.     Mammogram and ultrasound demonstrates a tiny approximately 6 mm diameter  soft tissue nodule within the skin immediately lateral to the nipple of  the left breast which has since been excised. There is a tiny focus of  diminished signal within the skin immediately lateral to the nipple that  may be a marking clip or an area of hemosiderin deposition. There is no  abnormal enhancement in this region or within the adjacent nipple that  was just residual neoplasm. No abnormal enhancement is identified  elsewhere within the left breast. There are no  foci of suspicious  enhancement in the right breast. Bilateral intact subpectoral silicone  implants are in place. There is no axillary lymphadenopathy. The chest  wall is unremarkable.     IMPRESSION:  Status post excision of a small breast carcinoma involving  the skin of the left breast immediately lateral to the nipple. There is  no MRI evidence of residual neoplasm at the excision site or elsewhere  within the left breast.. There is no suspicious enhancement in the right  breast.     BI-RADS Category 2-benign findings     This report was finalized on 1/9/2019 8:39 AM by Dr. Mario Alberto Ott M.D.       Assessment/Plan  In summary, I have a 58-year-old white female who has been extremely healthy all her life who noticed abnormalities with deviation of the nipple in the summer of 2018 on the left breast. Repeat ultrasound and mammograms documented so called “cysts” that looked benign. Given the persistency of the abnormality and her concern, she was again seen by Dr. Adams in 12/2018. She underwent a biopsy that turned out to document a ductal carcinoma with lobular feature, grade 2. This triggered subsequently an MRI that disclosed on 01/09/2019, no residual malignancy and this triggered a lumpectomy that documented a residual malignancy that was 16 mm in size. This malignancy documented an invasive ductal with lobular features grade 2 and also she had a 5 mm foci of DCIS. [place] lymph nodes were removed, none of them having any malignant metastatic disease. Margins of resection were negative.     The patient also had analysis of the tumor for ER strongly positive 98%, W[place] positive, HER2/mak negative by immunohistochemistry. Ki-67 was 11%.     In summary, I do believe that the so-called cystic lesions close to the nipple in the last year were probably representation of the lobular component of her disease and later on this became more obvious and triggered further radiological analysis. The patient has stage I  breast cancer on the left. She has completed a lumpectomy with complete resection, negative margins, negative sentinel lymph nodes. The tumor is highly ER positive, VT positive, HER2/mak 1+ by immunohistochemistry and negative sentinel lymph nodes. Ki-67. To me this is a very prognostic feature.     Also the patient has undergone genetic analysis and all of this was negative. This was done given the fact that the patient’s mother developed breast cancer at age 60 and she  as a consequence of breast cancer years later.     Obviously, this raises the question into the adjuvant therapy on this patient and I had a lengthy discussion with her and her  in regard to the following points.     1. Even though the tumor was a small tumor into the invasive component, but had a grade 2 disease and had some lobular features, I would like for the patient to have the benefit of the doubt in regard to Oncotype DX in regard to the risk of recurrence. My personal opinion is that this is going to be something that shows low grade of recurrence into the low risk; nevertheless, I advised her about how this test is done, why it is done and motivation to be sure that she is not going to end up walking the same road that her mother walked years ago.     I pointed out to her that it will take 2-3 weeks to get a report back but we are not in a hurry to make a decision about any form of adjuvant therapy on her at this time.   2. Given the fact that the patient’s tumor was ER/VT positive and HER2/mak negative and given the fact that her Ki-67 was 11% leads me to believe that the patient will be a candidate to receive adjuvant hormonal therapy for 5 years in the form of Femara. I briefly discussed the tablet with her but I did not start the medication until we reviewed the data from Oncotype DX.   3. The patient already has an appointment to be seen by Radiation Oncology on 2019 and I encouraged her to keep this appointment. In  my personal opinion, she will require radiation therapy to her left breast. She will not require any treatment to the left sentinel lymph node sampling area.   4. I discussed with her the analysis of her genetic testing that I was not even aware had been done. We found this in media in Epic.   5. I discussed with her the need in the future for followups to interchange mammograms, ultrasounds, breast examinations and MRIs given the lobular component of her disease. She agrees with that.   6. The patient has been receiving a vaginal tablet, hormone, estrogen, twice a week to increase lubrication and improve quality of intercourse. I advised her to discontinue this for this time and she is going to be seen by her gynecologist in regard to possible intervention including laser therapy in the vagina that will replenish and improve the quality of the tissues with this technique. This will obviate the need for any hormonal therapy.   7. I advised her to return to see me in 3 weeks for review at that time.   8. We had a length conversation extremely useful with her and her  today and they were extremely receptive to the conversation. They were gratified of the plan of care that we originally made and they are ready to pursue the Oncotype DX analysis as stated.

## 2019-03-11 ENCOUNTER — DOCUMENTATION (OUTPATIENT)
Dept: ONCOLOGY | Facility: CLINIC | Age: 59
End: 2019-03-11

## 2019-03-11 NOTE — PROGRESS NOTES
On 3/4/19, LCSW met with the patient prior to her consultation with Dr. Malik about treatment for her breast cancer. She had surgery on 2/20/19, with nipple removal. She states she has a long incision. She said she does not expect that chemotherapy, but likely tamoxifen will be recommended. She said she found a lump in her breast herself. It was pea sized. It was monitored for about a year. A mammogram indicated that it was a cyst. It recently began to grow.Then it was surgically removed. Her mother had breast cancer also. She has an appointment with radiation oncology on 3/13/19.     Pt has been  for 4 years. This is the 3rd marriage for both her and her . They each had 4 children from earlier relationships, and they have 8 grandchildren. All of the children live in [place]. Pt said that she does daily face time with her children. She said that she has a wide network of friends who have been helpful and supportive. 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 Her  is a  with [employer]. He works from Saturday night until Monday night. Pt had back surgery in July. She has a hip problem which has been better recently.     Pt said that she was terrified upon learning of her diagnosis. She said she saw her mother go through a lot with her breast cancer, and she worried that she would have similar problems. She feels better now that she has had surgery and is proceeding with appointments. LCSW reviewed social work services and availability. Assistance was offered as needed in the future.

## 2019-03-14 ENCOUNTER — CONSULT (OUTPATIENT)
Dept: RADIATION ONCOLOGY | Facility: HOSPITAL | Age: 59
End: 2019-03-14

## 2019-03-14 ENCOUNTER — APPOINTMENT (OUTPATIENT)
Dept: RADIATION ONCOLOGY | Facility: HOSPITAL | Age: 59
End: 2019-03-14

## 2019-03-14 VITALS
WEIGHT: 133 LBS | BODY MASS INDEX: 22.71 KG/M2 | DIASTOLIC BLOOD PRESSURE: 79 MMHG | OXYGEN SATURATION: 98 % | HEIGHT: 64 IN | HEART RATE: 86 BPM | SYSTOLIC BLOOD PRESSURE: 140 MMHG

## 2019-03-14 DIAGNOSIS — C50.112 MALIGNANT NEOPLASM OF CENTRAL PORTION OF LEFT BREAST IN FEMALE, ESTROGEN RECEPTOR POSITIVE (HCC): Primary | ICD-10-CM

## 2019-03-14 DIAGNOSIS — Z17.0 MALIGNANT NEOPLASM OF CENTRAL PORTION OF LEFT BREAST IN FEMALE, ESTROGEN RECEPTOR POSITIVE (HCC): Primary | ICD-10-CM

## 2019-03-14 PROCEDURE — 99205 OFFICE O/P NEW HI 60 MIN: CPT | Performed by: RADIOLOGY

## 2019-03-14 PROCEDURE — G0463 HOSPITAL OUTPT CLINIC VISIT: HCPCS | Performed by: RADIOLOGY

## 2019-03-14 NOTE — PROGRESS NOTES
Subjective     Mike Adams MD     Diagnosis Plan   1. Malignant neoplasm of central portion of left breast in female, estrogen receptor positive (CMS/HCC)       CC: T1cN0 left breast cancer, ERPR pos Her 2 neg                              Dear Mike Adams MD    I had the pleasure of seeing Alesha Eldridge  today [facility].    The patient is a 58 y.o. year old female with recently diagnosed left breast cancer, pT1cN0 ERPRpos Her 2 neg.  She presented with a palpable nodule/mass in the skin of left breast.  She first noticed the area approximately one year prior to diagnosis but mammogram and ultrasound were negative.  She had her yearly mammogram in December 3/2018 at [facility] which showed no abnormalities.  She has bilateral subpectoral silicone implants.  She then noticed the nodule near her nipple changing and saw Dr. Adams on 12/17/18 and he noted abnormality of the left nipple and a palpable mass beneath.      She underwent an excisional biopsy on 12/20/18 with pathology revealing invasive ductal carcinoma measuring up to 8mm, grade II, with focal perineural invasion but no lvsi and pagetoid involvement of the skin, ER % IN % and Her 2 negative, Ki 67 11%.  She then had a breast MRI on 1/7/19 which showed a tiny focus of signal in the skin lateral to the nipple with no abnormal enhancement and no other foci of enhancement in either breast or axilla.      She underwent a left breast lumpectomy with removal of the nipple/areola complex and sentinel node biopsy on 2/20/19 with pathology revealing a 16mm invasive ductal caricnoma, grade 2, with negative margins, the closest inferior margin was 3mm. The invasive carcinoma was noted to invade the dermis but witout skin ulceration.  A 5mm area of DCIS was identiified, solid and cribriform type, located 5mm from the closest posterior margin.  Atypical lobular hyperplasia was also noted.  Two sentinel lymph nodes were  negative for metastatic involvement.  Her final pathological stage was pT1cN0.    She met with Dr. Malik with medical oncology last week and he has ordered Oncotype testing which is still pending.  She did have genetic testing which was negative.  She is recovering well from her surgery and is referred today for evaluation for adjuvant radiation.    She is  with menarche age 11 and first delivery age 21.  She breast fed for 18 months.  She is postmenopausal due to removal of her uterus and one ovary in the following year:    She took birth control for 20 years and was on hormone replacement with Yuvafem at the time of diagnosis.  Her mother had breast cancer at age 60.          Review of Systems   Constitutional: Negative.    HENT: Negative.    Eyes: Positive for visual disturbance.   Respiratory: Negative.    Cardiovascular: Negative.    Gastrointestinal: Negative.    Genitourinary: Negative.    Musculoskeletal: Positive for back pain.   Skin: Negative.    Neurological: Positive for dizziness.   Hematological: Negative.    Psychiatric/Behavioral: Negative.          Past Medical History:   Diagnosis Date   • Arthritis     BACK   • Breast lump     LEFT   • Cancer (CMS/HCC) 2019    BREAST LEFT   • Hip pain, left    • History of back pain    • History of colon polyp    • History of osteopenia    • IBS (irritable bowel syndrome)    • Mild scoliosis    • Numbness in feet     LEFT SECOND TOE, GETTING INJECTIONS FOR THIS   • Seasonal allergies          Past Surgical History:   Procedure Laterality Date   • ANTERIOR AND POSTERIOR VAGINAL REPAIR  2010   • BREAST AUGMENTATION Bilateral    • BREAST BIOPSY Left 2018    Procedure: BREAST BIOPSY;  Surgeon: Mike Adams MD;  Location: [facility] OR;  Service: General   • BREAST LUMPECTOMY WITH SENTINEL NODE BIOPSY Left 2019    Procedure: BREAST LUMPECTOMY WITH SENTINEL NODE BIOPSY;  Surgeon: Mike Adams MD;  Location: [facility]  OR;  Service: General   • BUNIONECTOMY Bilateral     RIGHT       LEFT    • HERNIA REPAIR Left 1994    INGUINAL    • HERNIA REPAIR Left 1996    FEMORAL   • HYSTERECTOMY  2001   • LUMBAR FUSION  2018    L 4-5         Social History     Socioeconomic History   • Marital status:      Spouse name: Tommy   • Number of children: 4   • Years of education: Technical school   • Highest education level: Not on file   Social Needs   • Financial resource strain: Not hard at all   • Food insecurity - worry: Never true   • Food insecurity - inability: Never true   • Transportation needs - medical: No   • Transportation needs - non-medical: No   Occupational History     Employer: RETIRED   Tobacco Use   • Smoking status: Former Smoker     Packs/day: 0.50     Years: 10.00     Pack years: 5.00     Types: Cigarettes     Last attempt to quit:      Years since quittin.2   • Smokeless tobacco: Never Used   • Tobacco comment: QUIT 20-30 YEARS AGO   Substance and Sexual Activity   • Alcohol use: Yes     Alcohol/week: 4.2 - 8.4 oz     Types: 7 - 14 Glasses of wine per week   • Drug use: No   • Sexual activity: Defer         Family History   Problem Relation Age of Onset   • Breast cancer Mother 70        had it twice   • Esophageal cancer Father 80   • Leukemia Brother    • Colon cancer Brother    • Malig Hyperthermia Neg Hx           Objective    Physical Exam   Constitutional: She is oriented to person, place, and time. She appears well-developed and well-nourished.   HENT:   Head: Normocephalic and atraumatic.   Eyes: EOM are normal.   Neck: Neck supple.   Pulmonary/Chest:   Left nipple/areolar complex surgically absent, healing incision, no palpable masses in either breast, small seroma in left axilla under incision       Musculoskeletal: Normal range of motion.   Lymphadenopathy:     She has no cervical adenopathy.   Neurological: She is alert and oriented to person, place, and time.    Skin: Skin is warm.   Psychiatric: She has a normal mood and affect. Her behavior is normal. Judgment and thought content normal.         Current Outpatient Medications on File Prior to Visit   Medication Sig Dispense Refill   • coenzyme Q10 100 MG capsule Take 200 mg by mouth Daily.     • dicyclomine (BENTYL) 20 MG tablet Take 20 mg by mouth Every 6 (Six) Hours As Needed.     • Emollient (COLLAGEN EX) Take 6 capsules by mouth Daily.     • Flaxseed, Linseed, (FLAXSEED OIL) 1200 MG capsule Take 1,200 mg by mouth Daily.     • fluticasone (FLONASE) 50 MCG/ACT nasal spray 2 sprays by Each Nare route Daily.     • Milk Thistle 1000 MG capsule Take 240 mg by mouth Daily.     • Multiple Vitamins-Minerals (MULTIVITAMIN WITH MINERALS) tablet tablet Take 1 tablet by mouth Daily.     • tiZANidine (ZANAFLEX) 4 MG tablet Take 2 mg by mouth 2 (Two) Times a Day As Needed for Muscle Spasms.     • Unable to find 1 each 1 (One) Time. Med Name: sclerosing injections  7 injections every other week  1st injection was 2/4/19       No current facility-administered medications on file prior to visit.        ALLERGIES:  No Known Allergies    There were no vitals taken for this visit.     Current Status 3/4/2019   ECOG score 0         Assessment/Plan   58 year old female with pT1cN0 invasive ductal carcinoma left breast, ERPR pos Her 2 negative.  I have reviewed her records from referring physicians. I discussed with her my recommendation for adjuvant radiation to the left breast to reduce the risk of localregional recurrence.  I discussed with her the risks, benefits and rationale of radiation therapy to the left breast to include but not limited to the following:    Acute: Erythema, breakdown, swelling or discomfort of the breast, fatigue, pneumonitis resulting in shortness of breath, cough or pain    Late: Permanent skin changes including hyperpigmentation, telangiectasias, fibrosis of the breast resulting in smaller size, capsular  contracture or rupture of implant requiring removal, poor cosmetic outcome late edema or cellulitis, late rib fracture, late cardiac damage resulting in slight increased risk of heart attack, late pulmonary fibrosis and the remote risk of second malignancies.      She and her  voiced understanding and were given an opportunity to ask questions which were answered to their satisfaction.  I have scheduled her to return for CT simulation for treatment planning on April 8 once we have the Oncotype results.  I plan to treat the left breast with tangential fields to a dose of approximately 50 Gy, followed by a possible boost to the tumor bed for an additional 10 Gy due to the fact that she has an implant on the left.     I spent greater than 60 minutes in face-to-face time with the patient and 45 minutes of that time were spent in counseling and coordination of care, including review of imaging and pathology; indications, goals, logistics, alternatives and risks - both common and rare - for my recommendations as well as surveillance and potential outcomes.               Thank you very much for allowing me to participate in the care of this very pleasant patient.    Sincerely,      Jazmin Hernandez MD

## 2019-03-19 LAB
CYTO UR: NORMAL
LAB AP CASE REPORT: NORMAL
LAB AP DIAGNOSIS COMMENT: NORMAL
LAB AP SPECIAL STAINS: NORMAL
LAB AP SYNOPTIC CHECKLIST: NORMAL
PATH REPORT.ADDENDUM SPEC: NORMAL
PATH REPORT.FINAL DX SPEC: NORMAL
PATH REPORT.GROSS SPEC: NORMAL

## 2019-03-27 ENCOUNTER — OFFICE VISIT (OUTPATIENT)
Dept: ONCOLOGY | Facility: CLINIC | Age: 59
End: 2019-03-27

## 2019-03-27 ENCOUNTER — LAB (OUTPATIENT)
Dept: LAB | Facility: HOSPITAL | Age: 59
End: 2019-03-27

## 2019-03-27 VITALS
SYSTOLIC BLOOD PRESSURE: 132 MMHG | BODY MASS INDEX: 22.07 KG/M2 | OXYGEN SATURATION: 99 % | TEMPERATURE: 98.3 F | HEIGHT: 64 IN | WEIGHT: 129.3 LBS | HEART RATE: 85 BPM | DIASTOLIC BLOOD PRESSURE: 64 MMHG | RESPIRATION RATE: 18 BRPM

## 2019-03-27 DIAGNOSIS — C50.112 MALIGNANT NEOPLASM OF CENTRAL PORTION OF LEFT BREAST IN FEMALE, ESTROGEN RECEPTOR POSITIVE (HCC): Primary | ICD-10-CM

## 2019-03-27 DIAGNOSIS — Z17.0 MALIGNANT NEOPLASM OF CENTRAL PORTION OF LEFT BREAST IN FEMALE, ESTROGEN RECEPTOR POSITIVE (HCC): Primary | ICD-10-CM

## 2019-03-27 LAB
BASOPHILS # BLD AUTO: 0.02 10*3/MM3 (ref 0–0.2)
BASOPHILS NFR BLD AUTO: 0.4 % (ref 0–1.5)
DEPRECATED RDW RBC AUTO: 39.8 FL (ref 37–54)
EOSINOPHIL # BLD AUTO: 0.05 10*3/MM3 (ref 0–0.4)
EOSINOPHIL NFR BLD AUTO: 0.9 % (ref 0.3–6.2)
ERYTHROCYTE [DISTWIDTH] IN BLOOD BY AUTOMATED COUNT: 11.8 % (ref 12.3–15.4)
HCT VFR BLD AUTO: 42.6 % (ref 34–46.6)
HGB BLD-MCNC: 15.1 G/DL (ref 12–15.9)
IMM GRANULOCYTES # BLD AUTO: 0.05 10*3/MM3 (ref 0–0.05)
IMM GRANULOCYTES NFR BLD AUTO: 0.9 % (ref 0–0.5)
LYMPHOCYTES # BLD AUTO: 1.39 10*3/MM3 (ref 0.7–3.1)
LYMPHOCYTES NFR BLD AUTO: 24.5 % (ref 19.6–45.3)
MCH RBC QN AUTO: 32.8 PG (ref 26.6–33)
MCHC RBC AUTO-ENTMCNC: 35.4 G/DL (ref 31.5–35.7)
MCV RBC AUTO: 92.4 FL (ref 79–97)
MONOCYTES # BLD AUTO: 0.51 10*3/MM3 (ref 0.1–0.9)
MONOCYTES NFR BLD AUTO: 9 % (ref 5–12)
NEUTROPHILS # BLD AUTO: 3.65 10*3/MM3 (ref 1.4–7)
NEUTROPHILS NFR BLD AUTO: 64.3 % (ref 42.7–76)
NRBC BLD AUTO-RTO: 0 /100 WBC (ref 0–0)
PLATELET # BLD AUTO: 201 10*3/MM3 (ref 140–450)
PMV BLD AUTO: 8.8 FL (ref 6–12)
RBC # BLD AUTO: 4.61 10*6/MM3 (ref 3.77–5.28)
WBC NRBC COR # BLD: 5.67 10*3/MM3 (ref 3.4–10.8)

## 2019-03-27 PROCEDURE — 36415 COLL VENOUS BLD VENIPUNCTURE: CPT | Performed by: INTERNAL MEDICINE

## 2019-03-27 PROCEDURE — 99215 OFFICE O/P EST HI 40 MIN: CPT | Performed by: INTERNAL MEDICINE

## 2019-03-27 PROCEDURE — 85025 COMPLETE CBC W/AUTO DIFF WBC: CPT | Performed by: INTERNAL MEDICINE

## 2019-03-27 RX ORDER — LETROZOLE 2.5 MG/1
2.5 TABLET, FILM COATED ORAL DAILY
Qty: 30 TABLET | Refills: 6 | Status: SHIPPED | OUTPATIENT
Start: 2019-03-27 | End: 2019-10-23 | Stop reason: SDUPTHER

## 2019-03-27 NOTE — PROGRESS NOTES
Subjective     REASON FOR FOLLOW UP:  Left breast cancer 15 mm in size, ductal invasive, grade II,small lobular component, and dcis, sp/ lumpectomy, negative sentinel lymph nodes, Er + . Pr positive her 2 negative by IHC        History of Present Illness   This patient returned today to the office for followup in company of her  after she has completed healing of her left lumpectomy for stage I breast cancer. In the interim she has been seen by radiation oncology in preparation for initiation of radiation treatment in the next couple of weeks. The patient also is here today to review the report of her Oncotype DX that was sent after the original consultation several weeks ago. The patient besides this continues the healing of her lumpectomy site. She has not had any other further issues, no local infections, no pain and she is back to baseline. She is worried about the need or not of chemotherapy and the discussion will take place below.         Past Medical History:   Diagnosis Date   • Arthritis     BACK   • Breast lump     LEFT   • Cancer (CMS/HCC) 02/2019    BREAST LEFT   • Hip pain, left    • History of back pain    • History of colon polyp    • History of osteopenia    • IBS (irritable bowel syndrome)    • Mild scoliosis    • Numbness in feet     LEFT SECOND TOE, GETTING INJECTIONS FOR THIS   • Seasonal allergies         Past Surgical History:   Procedure Laterality Date   • ANTERIOR AND POSTERIOR VAGINAL REPAIR  04/2010   • BREAST AUGMENTATION Bilateral 2015   • BREAST BIOPSY Left 12/20/2018    Procedure: BREAST BIOPSY;  Surgeon: Mike Adams MD;  Location: [facility];  Service: General   • BREAST LUMPECTOMY WITH SENTINEL NODE BIOPSY Left 2/20/2019    Procedure: BREAST LUMPECTOMY WITH SENTINEL NODE BIOPSY;  Surgeon: Mike Adams MD;  Location: [facility];  Service: General   • BUNIONECTOMY Bilateral     RIGHT       LEFT 2-2015   • HERNIA REPAIR Left 03/1994     INGUINAL    • HERNIA REPAIR Left 1996    FEMORAL   • HYSTERECTOMY  2001   • LUMBAR FUSION  2018    L 4-5        Current Outpatient Medications on File Prior to Visit   Medication Sig Dispense Refill   • coenzyme Q10 100 MG capsule Take 200 mg by mouth Daily.     • dicyclomine (BENTYL) 20 MG tablet Take 20 mg by mouth Every 6 (Six) Hours As Needed.     • Emollient (COLLAGEN EX) Take 6 capsules by mouth Daily.     • Flaxseed, Linseed, (FLAXSEED OIL) 1200 MG capsule Take 1,200 mg by mouth Daily.     • fluticasone (FLONASE) 50 MCG/ACT nasal spray 2 sprays by Each Nare route Daily.     • Milk Thistle 1000 MG capsule Take 240 mg by mouth Daily.     • Multiple Vitamins-Minerals (MULTIVITAMIN WITH MINERALS) tablet tablet Take 1 tablet by mouth Daily.     • tiZANidine (ZANAFLEX) 4 MG tablet Take 2 mg by mouth 2 (Two) Times a Day As Needed for Muscle Spasms.     • Unable to find 1 each 1 (One) Time. Med Name: sclerosing injections  7 injections every other week  1st injection was 19       No current facility-administered medications on file prior to visit.         ALLERGIES:  No Known Allergies     Social History     Socioeconomic History   • Marital status:      Spouse name: Tommy   • Number of children: 4   • Years of education: Technical school   • Highest education level: Not on file   Occupational History     Employer: RETIRED   Social Needs   • Financial resource strain: Not hard at all   • Food insecurity:     Worry: Never true     Inability: Never true   • Transportation needs:     Medical: No     Non-medical: No   Tobacco Use   • Smoking status: Former Smoker     Packs/day: 0.50     Years: 10.00     Pack years: 5.00     Types: Cigarettes     Last attempt to quit:      Years since quittin.2   • Smokeless tobacco: Never Used   • Tobacco comment: QUIT 20-30 YEARS AGO   Substance and Sexual Activity   • Alcohol use: Yes     Alcohol/week: 4.2 - 8.4 oz     Types: 7 - 14 Glasses of wine per  week   • Drug use: No   • Sexual activity: Defer        Family History   Problem Relation Age of Onset   • Breast cancer Mother 70        had it twice   • Esophageal cancer Father 80   • Leukemia Brother    • Colon cancer Brother    • Malig Hyperthermia Neg Hx       ONCOLOGIC HISTORY:I have been asked to see this patient in consultation today by Willian Adams MD after she has undergone a left breast lumpectomy in the background of abnormal mammogram that has been an ongoing issue since first of 2018. On that occasion she was seen by her obstetrician/gynecologist and a mammogram and ultrasound were on a couple of occasions negative benign. The patient was reviewed again by Dr. Adams by the end of 2018 and given the fact that the patient was having minimal nipple retraction, it was decided to go ahead and proceed with a biopsy that documented ductal carcinoma invasive with lobular component and DCIS. Since then the patient has undergone a lumpectomy and sentinel lymph node sampling. The patient is known for having breast implants that have been present for at least 10 or 12 years. These structures were not touched or modified during the recent surgical time. In any event, the patient has healed well from surgery. She has some leftover pain in the left axilla and minimal numbness but otherwise good rotation and range of motion for her left upper extremity. Her appetite has been normal. Her bowel function has been normal. Urination has been normal. She has not had any cardiovascular or respiratory issues of any kind and no bone pain or joint pain. She has had chronic back pain now for almost 2 years related to degenerative disease and previous surgery. This pain is no different in character or timing. The patient denies any neurological symptomatology. Otherwise, she is in excellent health.       Review of Systems       General: no fever, no chills, no fatigue,no weight changes, no lack of appetite.  Eyes: no  epiphora, xerophthalmia,conjunctivitis, pain, glaucoma, blurred vision, blindness, secretion, photophobia, proptosis, diplopia.  Ears: no otorrhea, tinnitus, otorrhagia, deafness, pain, vertigo.  Nose: no rhinorrhea, no epistaxis, no alteration in perception of odors, no sinuses pressure.  Mouth: no alteration in gums or teeth,  No ulcers, no difficulty with mastication or deglut ion, no odynophagia.  Neck: no masses or pain, no thyroid alterations, no pain in muscles or arteries, no carotid odynia, no crepitation.  Respiratory: no cough, no sputum production,no dyspnea,no trepopnea, no pleuritic pain,no hemoptysis.  Heart: no syncope, no irregularity, no palpitations, no angina,no orthopnea,no paroxysmal nocturnal dyspnea.  Vascular Venous: no tenderness,no edema,no palpable cords,no postphlebitic syndrome, no skin changes no ulcerations.  Vascular Arterial: no distal ischemia, noclaudication, no gangrene, no neuropathic ischemic pain, no skin ulcers, no paleness no cyanosis.  GI: no dysphagia, no odynophagia, no regurgitation, no heartburn,no indigestion,no nausea,no vomiting,no hematemesis ,no melena,no jaundice,no distention, no obstipation,no enterorrhagia,no proctalgia,no anal  lesions, no changes in bowel habits.  : no frequency, no hesitancy, no hematuria, no discharge,no  pain.  Musculoskeletal: no muscle or tendon pain or inflammation,no  joint pain, no edema, no functional limitation,no fasciculations, no mass.  Neurologic: no headache, no seizures, noalterations on Craneal nerves, no motor deficit, no sensory deficit, normal coordination, no alteration in memory,normal orientation, calculation,normal writting, verbal and written language.  Skin: no rashes,no pruritus no localized lesions.  Psychiatric: no anxiety, no depression,no agitation, no delusions, proper insight.      Objective     Vitals:    03/27/19 1133   BP: 132/64   Pulse: 85   Resp: 18   Temp: 98.3 °F (36.8 °C)   TempSrc: Oral   SpO2: 99%  "  Weight: 58.7 kg (129 lb 4.8 oz)   Height: 162.6 cm (64.02\")   PainSc: 0-No pain     Current Status 3/27/2019   ECOG score 0       Physical Exam        GENERAL:  Well-developed, well-nourished  Patient  in no acute distress.   SKIN:  Warm, dry ,NO rashes,NO purpura ,NO petechiae.  HEENT:  Pupils were equal and reactive to light and accomodation, conjunctivas non injected, no pterigion, normal extraocular movements, normal visual acuity.   Mouth mucosa was moist, no exudates in oropharynx, normal gum line, normal roof of the mouth and pillars, normal papillations of the tongue.  NECK:  Supple with good range of motion; no thyromegaly or masses, no JVD or bruits, no cervical adenopathies.No carotid arteries pain, no carotid abnormal pulsation , NO arterial dance.  LYMPHATICS:  No cervical, NO supraclavicular, NO axillary,NO epitrochlear , NO inguinal adenopathy.  CHEST:  Normal excursion of both yung thoraces, normal voice fremitus, no subcutaneous emphysema, normal axillas, no rashes or acanthosis nigricans. Lungs clear to percussion and auscultation, normal breath sounds bilaterally, no wheezing,NO crackles NO ronchi, NO stridor, NO rubs.  CARDIAC AND VASCULAR:  normal rate and regular rhythm, without murmurs,NO rubs NO S3 NO S4 right or left . Normal femoral, popliteal, pedis, brachial and carotid pulses.  ABDOMEN:  Soft, nontender with no organomegaly or masses, no ascites, no collateral circulation,no distention,no [place] sign, no abdominal pain, no inguinal hernias,no umbilical hernia, no abdominal bruits. Normal bowel sounds.  GENITAL: Not  Performed.  EXTREMITIES  AND SPINE:  No clubbing, cyanosis or edema, no deformities or pain .No kyphosis, scoliosis, deformities or pain in spine, ribs or pelvic bone.  NEUROLOGICAL:  Patient was awake, alert, oriented to time, person and place.        RECENT LABS:  Hematology WBC   Date Value Ref Range Status   03/27/2019 5.67 3.40 - 10.80 10*3/mm3 Final     RBC   Date " Value Ref Range Status   03/27/2019 4.61 3.77 - 5.28 10*6/mm3 Final     Hemoglobin   Date Value Ref Range Status   03/27/2019 15.1 12.0 - 15.9 g/dL Final     Hematocrit   Date Value Ref Range Status   03/27/2019 42.6 34.0 - 46.6 % Final     Platelets   Date Value Ref Range Status   03/27/2019 201 140 - 450 10*3/mm3 Final          Assessment/Plan  In summary, I have a 58-year-old white female who has been extremely healthy all her life who noticed abnormalities with deviation of the nipple in the summer of 2018 on the left breast. Repeat ultrasound and mammograms documented so called “cysts” that looked benign. Given the persistency of the abnormality and her concern, she was again seen by Dr. Adams in 12/2018. She underwent a biopsy that turned out to document a ductal carcinoma with lobular feature, grade 2. This triggered subsequently an MRI that disclosed on 01/09/2019, no residual malignancy and this triggered a lumpectomy that documented a residual malignancy that was 16 mm in size. This malignancy documented an invasive ductal with lobular features grade 2 and also she had a 5 mm foci of DCIS. Shiro lymph nodes were removed, none of them having any malignant metastatic disease. Margins of resection were negative.     The patient also had analysis of the tumor for ER strongly positive 98%, MA positive, HER2/mak negative by immunohistochemistry. Ki-67 was 11%.     In summary, I do believe that the so-called cystic lesions close to the nipple in the last year were probably representation of the lobular component of her disease and later on this became more obvious and triggered further radiological analysis. The patient has stage I breast cancer on the left. She has completed a lumpectomy with complete resection, negative margins, negative sentinel lymph nodes. The tumor is highly ER positive, MA positive, HER2/mak 1+ by immunohistochemistry and negative sentinel lymph nodes. Ki-67. To me this is a very  prognostic feature.     Also the patient has undergone genetic analysis and all of this was negative. This was done given the fact that the patient’s mother developed breast cancer at age 60 and she  as a consequence of breast cancer years later.       After reviewing her Oncotype DX today along with her  and providing her the report her risk of recurrence score is 19 therefore she is in the low risk category and the benefit of chemotherapy for her will be minimal if any at all. On the other hand she will benefit from hormonal therapy. For this reason I discussed with her the fact that I would like for her to initiate Femara at a dose of 2.5 mg a day for the next 5 years. The medicine will be not only protecting her from developing recurrent breast cancer but will be effective as well decreasing the chance of contralateral new primary breast cancer. I explained to her side-effects of the medicine including joint pain that most of the time is transitory and improves with exercise and also the development of worsening osteoporosis. The patient has had a bone density test done more than 2 years ago and I am going to go ahead and schedule a bone density test before she returns back in 5 weeks.     I discussed with her again the lack of need for any chemotherapy treatment. This will not be beneficial whatsoever.     I also discussed with her the fact that her reconstruction is completed. She feels satisfied with the cosmetic result and she will keep going in regard further improvement.     She already has an appointment for initiation of radiation therapy by radiation oncology in the next couple of weeks. That is perfectly fine with me.     I will review her back in 6 weeks, DEXA scan done the week before and eventually she will be sent to the multidisciplinary clinic for followup in regard survivorship clinic.     I discussed all of these facts with the patient and her  and they were ready to proceed.  I E-prescribed her Femara.

## 2019-04-08 ENCOUNTER — OFFICE VISIT (OUTPATIENT)
Dept: RADIATION ONCOLOGY | Facility: HOSPITAL | Age: 59
End: 2019-04-08

## 2019-04-08 ENCOUNTER — APPOINTMENT (OUTPATIENT)
Dept: RADIATION ONCOLOGY | Facility: HOSPITAL | Age: 59
End: 2019-04-08

## 2019-04-08 VITALS
HEIGHT: 64 IN | BODY MASS INDEX: 22.02 KG/M2 | SYSTOLIC BLOOD PRESSURE: 129 MMHG | DIASTOLIC BLOOD PRESSURE: 74 MMHG | OXYGEN SATURATION: 99 % | HEART RATE: 82 BPM | WEIGHT: 129 LBS

## 2019-04-08 DIAGNOSIS — Z17.0 MALIGNANT NEOPLASM OF CENTRAL PORTION OF LEFT BREAST IN FEMALE, ESTROGEN RECEPTOR POSITIVE (HCC): Primary | ICD-10-CM

## 2019-04-08 DIAGNOSIS — C50.112 MALIGNANT NEOPLASM OF CENTRAL PORTION OF LEFT BREAST IN FEMALE, ESTROGEN RECEPTOR POSITIVE (HCC): Primary | ICD-10-CM

## 2019-04-08 PROCEDURE — 77263 THER RADIOLOGY TX PLNG CPLX: CPT | Performed by: RADIOLOGY

## 2019-04-08 PROCEDURE — 77333 RADIATION TREATMENT AID(S): CPT | Performed by: RADIOLOGY

## 2019-04-08 PROCEDURE — 77290 THER RAD SIMULAJ FIELD CPLX: CPT | Performed by: RADIOLOGY

## 2019-04-08 PROCEDURE — 77370 RADIATION PHYSICS CONSULT: CPT | Performed by: RADIOLOGY

## 2019-04-08 PROCEDURE — 99214 OFFICE O/P EST MOD 30 MIN: CPT | Performed by: RADIOLOGY

## 2019-04-08 NOTE — PROGRESS NOTES
Subjective     No ref. provider found     Diagnosis Plan   1. Malignant neoplasm of central portion of left breast in female, estrogen receptor positive (CMS/HCC)       CC: T1cN0 left breast cancer, ERPR pos Her 2 neg                              Dear Mike Adams MD     I had the pleasure of seeing Alesha Eldridge  today in the [facility].    The patient is a 58 y.o. year old female with recently diagnosed left breast cancer, pT1cN0 ERPRpos Her 2 neg.  She presented with a palpable nodule/mass in the skin of left breast.  She first noticed the area approximately one year prior to diagnosis but mammogram and ultrasound were negative.  She had her yearly mammogram in December 3/2018 at [facility] which showed no abnormalities.  She has bilateral subpectoral silicone implants.  She then noticed the nodule near her nipple changing and saw Dr. Adams on 12/17/18 and he noted abnormality of the left nipple and a palpable mass beneath.       She underwent an excisional biopsy on 12/20/18 with pathology revealing invasive ductal carcinoma measuring up to 8mm, grade II, with focal perineural invasion but no lvsi and pagetoid involvement of the skin, ER % AR % and Her 2 negative, Ki 67 11%.  She then had a breast MRI on 1/7/19 which showed a tiny focus of signal in the skin lateral to the nipple with no abnormal enhancement and no other foci of enhancement in either breast or axilla.       She underwent a left breast lumpectomy with removal of the nipple/areola complex and sentinel node biopsy on 2/20/19 with pathology revealing a 16mm invasive ductal caricnoma, grade 2, with negative margins, the closest inferior margin was 3mm. The invasive carcinoma was noted to invade the dermis but witout skin ulceration.  A 5mm area of DCIS was identiified, solid and cribriform type, located 5mm from the closest posterior margin.  Atypical lobular hyperplasia was also noted.  Two sentinel lymph nodes were  negative for metastatic involvement.  Her final pathological stage was pT1cN0.    She met with Dr. Malik with medical oncology last week and he has ordered Oncotype testing which is still pending.  She did have genetic testing which was negative.  She is recovering well from her surgery and is referred today for evaluation for adjuvant radiation.     She is  with menarche age 11 and first delivery age 21.  She breast fed for 18 months.  She is postmenopausal due to removal of her uterus and one ovary in the following year:    She took birth control for 20 years and was on hormone replacement with Yuvafem at the time of diagnosis.  Her mother had breast cancer at age 60.    Interval history 19: oncotype testing returned with a score of 19 with little benefit for chemotherapy.  She met back with Dr. Malik and he has recommended femara but no chemotherapy.  She returns today for re-evaluation and treatment planning.     Review of Systems   Constitutional: Negative.    HENT: Negative.    Respiratory: Negative.    Cardiovascular: Negative.    Genitourinary: Negative.    Skin: Negative.    Psychiatric/Behavioral: Negative.          Past Medical History:   Diagnosis Date   • Arthritis     BACK   • Breast lump     LEFT   • Cancer (CMS/HCC) 2019    BREAST LEFT   • Hip pain, left    • History of back pain    • History of colon polyp    • History of osteopenia    • IBS (irritable bowel syndrome)    • Mild scoliosis    • Numbness in feet     LEFT SECOND TOE, GETTING INJECTIONS FOR THIS   • Seasonal allergies          Past Surgical History:   Procedure Laterality Date   • ANTERIOR AND POSTERIOR VAGINAL REPAIR  2010   • BREAST AUGMENTATION Bilateral    • BREAST BIOPSY Left 2018    Procedure: BREAST BIOPSY;  Surgeon: Mike Adams MD;  Location: [facility];  Service: General   • BREAST LUMPECTOMY WITH SENTINEL NODE BIOPSY Left 2019    Procedure: BREAST LUMPECTOMY WITH SENTINEL NODE  BIOPSY;  Surgeon: Mike Adams MD;  Location: [facility] OR;  Service: General   • BUNIONECTOMY Bilateral     RIGHT       LEFT    • HERNIA REPAIR Left 1994    INGUINAL    • HERNIA REPAIR Left 1996    FEMORAL   • HYSTERECTOMY  2001   • LUMBAR FUSION  2018    L 4-5         Social History     Socioeconomic History   • Marital status:      Spouse name: Tommy   • Number of children: 4   • Years of education: Technical school   • Highest education level: Not on file   Occupational History     Employer: RETIRED   Social Needs   • Financial resource strain: Not hard at all   • Food insecurity:     Worry: Never true     Inability: Never true   • Transportation needs:     Medical: No     Non-medical: No   Tobacco Use   • Smoking status: Former Smoker     Packs/day: 0.50     Years: 10.00     Pack years: 5.00     Types: Cigarettes     Last attempt to quit:      Years since quittin.2   • Smokeless tobacco: Never Used   • Tobacco comment: QUIT 20-30 YEARS AGO   Substance and Sexual Activity   • Alcohol use: Yes     Alcohol/week: 4.2 - 8.4 oz     Types: 7 - 14 Glasses of wine per week   • Drug use: No   • Sexual activity: Defer         Family History   Problem Relation Age of Onset   • Breast cancer Mother 70        had it twice   • Esophageal cancer Father 80   • Leukemia Brother    • Colon cancer Brother    • Malig Hyperthermia Neg Hx           Objective    Physical Exam   Constitutional: She is oriented to person, place, and time. She appears well-developed and well-nourished.   HENT:   Head: Atraumatic.   Eyes: EOM are normal.   Neck: Neck supple.   Pulmonary/Chest:   Nipple areola complex surgically absent, no palpable masses in either breast or axilla, breasts are large and equal in size, subpectoral implants in place       Musculoskeletal: Normal range of motion.   Lymphadenopathy:     She has no cervical adenopathy.   Neurological: She is alert and oriented to person,  place, and time.   Skin: Skin is warm and dry. No erythema.   Psychiatric: She has a normal mood and affect. Her behavior is normal. Judgment and thought content normal.         Current Outpatient Medications on File Prior to Visit   Medication Sig Dispense Refill   • coenzyme Q10 100 MG capsule Take 200 mg by mouth Daily.     • dicyclomine (BENTYL) 20 MG tablet Take 20 mg by mouth Every 6 (Six) Hours As Needed.     • Emollient (COLLAGEN EX) Take 6 capsules by mouth Daily.     • Flaxseed, Linseed, (FLAXSEED OIL) 1200 MG capsule Take 1,200 mg by mouth Daily.     • fluticasone (FLONASE) 50 MCG/ACT nasal spray 2 sprays by Each Nare route Daily.     • letrozole (FEMARA) 2.5 MG tablet Take 1 tablet by mouth Daily for 90 days. 30 tablet 6   • Milk Thistle 1000 MG capsule Take 240 mg by mouth Daily.     • Multiple Vitamins-Minerals (MULTIVITAMIN WITH MINERALS) tablet tablet Take 1 tablet by mouth Daily.     • tiZANidine (ZANAFLEX) 4 MG tablet Take 2 mg by mouth 2 (Two) Times a Day As Needed for Muscle Spasms.     • Unable to find 1 each 1 (One) Time. Med Name: sclerosing injections  7 injections every other week  1st injection was 2/4/19       No current facility-administered medications on file prior to visit.        ALLERGIES:  No Known Allergies    There were no vitals taken for this visit.     Current Status 3/27/2019   ECOG score 0         Assessment/Plan     58 year old female with pT1cN0 invasive ductal carcinoma left breast, ERPR pos Her 2 negative.  I again discussed with her my recommendation for adjuvant radiation to the left breast to reduce the risk of localregional recurrence.  I reviewed with her the risks, benefits and rationale of radiation therapy to the left breast to include but not limited to the following:     Acute: Erythema, breakdown, swelling or discomfort of the breast, fatigue, pneumonitis resulting in shortness of breath, cough or pain     Late: Permanent skin changes including  hyperpigmentation, telangiectasias, fibrosis of the breast resulting in smaller size, capsular contracture or rupture of implant requiring removal, poor cosmetic outcome late edema or cellulitis, late rib fracture, late cardiac damage resulting in slight increased risk of heart attack, late pulmonary fibrosis and the remote risk of second malignancies.       She voiced understanding and was given an opportunity to ask questions which were answered to her satisfaction.  We will proceed with CT simulation for treatment planning today and begin her treatments in the next week.  I plan to treat the left breast with tangential fields to a dose of approximately 50 Gy, followed by a possible boost to the tumor bed for an additional 10 Gy due to the fact that she has a subpectoral implant on the left.             Thank you very much for allowing me to participate in the care of this very pleasant patient.    Sincerely,      Jazmin Hernandez MD

## 2019-04-09 PROCEDURE — 77470 SPECIAL RADIATION TREATMENT: CPT | Performed by: RADIOLOGY

## 2019-04-10 PROCEDURE — 77295 3-D RADIOTHERAPY PLAN: CPT | Performed by: RADIOLOGY

## 2019-04-10 PROCEDURE — 77300 RADIATION THERAPY DOSE PLAN: CPT | Performed by: RADIOLOGY

## 2019-04-10 PROCEDURE — 77332 RADIATION TREATMENT AID(S): CPT | Performed by: RADIOLOGY

## 2019-04-10 PROCEDURE — 77334 RADIATION TREATMENT AID(S): CPT | Performed by: RADIOLOGY

## 2019-04-10 PROCEDURE — 77293 RESPIRATOR MOTION MGMT SIMUL: CPT | Performed by: RADIOLOGY

## 2019-04-15 PROCEDURE — 77280 THER RAD SIMULAJ FIELD SMPL: CPT | Performed by: RADIOLOGY

## 2019-04-15 PROCEDURE — 77412 RADIATION TX DELIVERY LVL 3: CPT | Performed by: RADIOLOGY

## 2019-04-15 PROCEDURE — 77427 RADIATION TX MANAGEMENT X5: CPT | Performed by: RADIOLOGY

## 2019-04-16 PROCEDURE — 77412 RADIATION TX DELIVERY LVL 3: CPT | Performed by: RADIOLOGY

## 2019-04-16 PROCEDURE — 77387 GUIDANCE FOR RADJ TX DLVR: CPT | Performed by: RADIOLOGY

## 2019-04-17 ENCOUNTER — RADIATION ONCOLOGY WEEKLY ASSESSMENT (OUTPATIENT)
Dept: RADIATION ONCOLOGY | Facility: HOSPITAL | Age: 59
End: 2019-04-17

## 2019-04-17 VITALS
HEART RATE: 84 BPM | HEIGHT: 64 IN | BODY MASS INDEX: 22.02 KG/M2 | DIASTOLIC BLOOD PRESSURE: 79 MMHG | OXYGEN SATURATION: 100 % | SYSTOLIC BLOOD PRESSURE: 132 MMHG | WEIGHT: 129 LBS

## 2019-04-17 DIAGNOSIS — C50.112 MALIGNANT NEOPLASM OF CENTRAL PORTION OF LEFT BREAST IN FEMALE, ESTROGEN RECEPTOR POSITIVE (HCC): Primary | ICD-10-CM

## 2019-04-17 DIAGNOSIS — Z17.0 MALIGNANT NEOPLASM OF CENTRAL PORTION OF LEFT BREAST IN FEMALE, ESTROGEN RECEPTOR POSITIVE (HCC): Primary | ICD-10-CM

## 2019-04-17 PROCEDURE — 77387 GUIDANCE FOR RADJ TX DLVR: CPT | Performed by: RADIOLOGY

## 2019-04-17 PROCEDURE — 77412 RADIATION TX DELIVERY LVL 3: CPT | Performed by: RADIOLOGY

## 2019-04-17 NOTE — PROGRESS NOTES
Physician Weekly Management Note    Diagnosis:     Diagnosis Plan   1. Malignant neoplasm of central portion of left breast in female, estrogen receptor positive (CMS/HCC)         RT Details:  fx 3/30 left breast  Notes on Treatment course, Films, Medical progress:  Doing well so far, no skin changes no fatigue    Weekly Management:  Medication reviewed?   Yes  New medications given?   No  Problemlist reviewed?   Yes  Problem added?   No  Issues raised requiring referral to support services - task assigned to:  na    Technical aspects reviewed:  Weekly OBI approved?   Yes  Weekly port films approved?   Yes  Change requests noted on port film?   No  Patient setup and plan reviewed?   Yes    Chart Reviewed:  Continue current treatment plan?   Yes  Treatment plan change requested?   No  CBC reviewed?   No  Concurrent Chemo?   No    Objective     Toxicities:   none     Review of Systems   Constitutional: Negative.    HENT: Negative.    Respiratory: Negative.    Skin: Negative.           There were no vitals filed for this visit.    Current Status 3/27/2019   ECOG score 0       Physical Exam   Constitutional: She appears well-developed and well-nourished.   Pulmonary/Chest:   No erythema               Problem Summary List    Diagnosis:     Diagnosis Plan   1. Malignant neoplasm of central portion of left breast in female, estrogen receptor positive (CMS/HCC)       Pathology:   breast    Past Medical History:   Diagnosis Date   • Arthritis     BACK   • Breast lump     LEFT   • Cancer (CMS/HCC) 02/2019    BREAST LEFT   • Hip pain, left    • History of back pain    • History of colon polyp    • History of osteopenia    • IBS (irritable bowel syndrome)    • Mild scoliosis    • Numbness in feet     LEFT SECOND TOE, GETTING INJECTIONS FOR THIS   • Seasonal allergies          Past Surgical History:   Procedure Laterality Date   • ANTERIOR AND POSTERIOR VAGINAL REPAIR  04/2010   • BREAST AUGMENTATION Bilateral 2015   • BREAST  BIOPSY Left 12/20/2018    Procedure: BREAST BIOPSY;  Surgeon: Mike Adams MD;  Location: [facility] OR;  Service: General   • BREAST LUMPECTOMY WITH SENTINEL NODE BIOPSY Left 2/20/2019    Procedure: BREAST LUMPECTOMY WITH SENTINEL NODE BIOPSY;  Surgeon: Mike Adams MD;  Location: [facility] MAIN OR;  Service: General   • BUNIONECTOMY Bilateral     RIGHT       LEFT 2-2015   • HERNIA REPAIR Left 03/1994    INGUINAL    • HERNIA REPAIR Left 04/1996    FEMORAL   • HYSTERECTOMY  02/2001   • LUMBAR FUSION  07/11/2018    L 4-5         Current Outpatient Medications on File Prior to Visit   Medication Sig Dispense Refill   • coenzyme Q10 100 MG capsule Take 200 mg by mouth Daily.     • dicyclomine (BENTYL) 20 MG tablet Take 20 mg by mouth Every 6 (Six) Hours As Needed.     • Emollient (COLLAGEN EX) Take 6 capsules by mouth Daily.     • Flaxseed, Linseed, (FLAXSEED OIL) 1200 MG capsule Take 1,200 mg by mouth Daily.     • fluticasone (FLONASE) 50 MCG/ACT nasal spray 2 sprays by Each Nare route Daily.     • letrozole (FEMARA) 2.5 MG tablet Take 1 tablet by mouth Daily for 90 days. 30 tablet 6   • Milk Thistle 1000 MG capsule Take 240 mg by mouth Daily.     • Multiple Vitamins-Minerals (MULTIVITAMIN WITH MINERALS) tablet tablet Take 1 tablet by mouth Daily.     • tiZANidine (ZANAFLEX) 4 MG tablet Take 2 mg by mouth 2 (Two) Times a Day As Needed for Muscle Spasms.     • Unable to find 1 each 1 (One) Time. Med Name: sclerosing injections  7 injections every other week  1st injection was 2/4/19       No current facility-administered medications on file prior to visit.        No Known Allergies      Referring Provider:    No referring provider defined for this encounter.    Oncologist:  No primary care provider on file.      Seen and approved by:  Jazmin Hernandez MD  04/17/2019

## 2019-04-18 PROCEDURE — 77387 GUIDANCE FOR RADJ TX DLVR: CPT | Performed by: RADIOLOGY

## 2019-04-18 PROCEDURE — 77412 RADIATION TX DELIVERY LVL 3: CPT | Performed by: RADIOLOGY

## 2019-04-19 PROCEDURE — 77336 RADIATION PHYSICS CONSULT: CPT | Performed by: RADIOLOGY

## 2019-04-19 PROCEDURE — 77412 RADIATION TX DELIVERY LVL 3: CPT | Performed by: RADIOLOGY

## 2019-04-19 PROCEDURE — 77387 GUIDANCE FOR RADJ TX DLVR: CPT | Performed by: RADIOLOGY

## 2019-04-22 PROCEDURE — 77387 GUIDANCE FOR RADJ TX DLVR: CPT | Performed by: RADIOLOGY

## 2019-04-22 PROCEDURE — 77427 RADIATION TX MANAGEMENT X5: CPT | Performed by: RADIOLOGY

## 2019-04-22 PROCEDURE — 77412 RADIATION TX DELIVERY LVL 3: CPT | Performed by: RADIOLOGY

## 2019-04-23 PROCEDURE — 77387 GUIDANCE FOR RADJ TX DLVR: CPT | Performed by: RADIOLOGY

## 2019-04-23 PROCEDURE — 77412 RADIATION TX DELIVERY LVL 3: CPT | Performed by: RADIOLOGY

## 2019-04-24 PROCEDURE — 77412 RADIATION TX DELIVERY LVL 3: CPT | Performed by: RADIOLOGY

## 2019-04-24 PROCEDURE — 77387 GUIDANCE FOR RADJ TX DLVR: CPT | Performed by: RADIOLOGY

## 2019-04-25 ENCOUNTER — RADIATION ONCOLOGY WEEKLY ASSESSMENT (OUTPATIENT)
Dept: RADIATION ONCOLOGY | Facility: HOSPITAL | Age: 59
End: 2019-04-25

## 2019-04-25 VITALS
OXYGEN SATURATION: 98 % | HEART RATE: 81 BPM | HEIGHT: 64 IN | BODY MASS INDEX: 22.02 KG/M2 | SYSTOLIC BLOOD PRESSURE: 117 MMHG | DIASTOLIC BLOOD PRESSURE: 78 MMHG | WEIGHT: 129 LBS

## 2019-04-25 DIAGNOSIS — C50.112 MALIGNANT NEOPLASM OF CENTRAL PORTION OF LEFT BREAST IN FEMALE, ESTROGEN RECEPTOR POSITIVE (HCC): Primary | ICD-10-CM

## 2019-04-25 DIAGNOSIS — Z17.0 MALIGNANT NEOPLASM OF CENTRAL PORTION OF LEFT BREAST IN FEMALE, ESTROGEN RECEPTOR POSITIVE (HCC): Primary | ICD-10-CM

## 2019-04-25 PROCEDURE — 77387 GUIDANCE FOR RADJ TX DLVR: CPT | Performed by: RADIOLOGY

## 2019-04-25 PROCEDURE — 77412 RADIATION TX DELIVERY LVL 3: CPT | Performed by: RADIOLOGY

## 2019-04-25 NOTE — PROGRESS NOTES
Physician Weekly Management Note    Diagnosis:     Diagnosis Plan   1. Malignant neoplasm of central portion of left breast in female, estrogen receptor positive (CMS/HCC)         RT Details:  fx 9/30 left breast  Notes on Treatment course, Films, Medical progress:  Doing well, no erythema, mild fatigue, tearful today and states her sex drive has gone since stopping her estrogen supplement. I advised her to get in with Dr. Black in next 2 weeks to discuss. Offered emotional support and encouragement. Offered referral to [facility]  and she is interested, will place a referral    Weekly Management:  Medication reviewed?   Yes  New medications given?   No  Problemlist reviewed?   Yes  Problem added?   No  Issues raised requiring referral to support services - task assigned to:  na    Technical aspects reviewed:  Weekly OBI approved?   Yes  Weekly port films approved?   Yes  Change requests noted on port film?   No  Patient setup and plan reviewed?   Yes    Chart Reviewed:  Continue current treatment plan?   Yes  Treatment plan change requested?   No  CBC reviewed?   No  Concurrent Chemo?   No    Objective     Toxicities:   fatigue     Review of Systems   Constitutional: Positive for fatigue.   Skin: Negative.    Psychiatric/Behavioral: Positive for dysphoric mood.          There were no vitals filed for this visit.    Current Status 3/27/2019   ECOG score 0       Physical Exam   Constitutional: She appears well-developed and well-nourished.   HENT:   Head: Atraumatic.   Pulmonary/Chest:   No erythema, no masses palpated               Problem Summary List    Diagnosis:     Diagnosis Plan   1. Malignant neoplasm of central portion of left breast in female, estrogen receptor positive (CMS/HCC)       Pathology:   breast    Past Medical History:   Diagnosis Date   • Arthritis     BACK   • Breast lump     LEFT   • Cancer (CMS/HCC) 02/2019    BREAST LEFT   • Hip pain, left    • History of back pain      • History of colon polyp    • History of osteopenia    • IBS (irritable bowel syndrome)    • Mild scoliosis    • Numbness in feet     LEFT SECOND TOE, GETTING INJECTIONS FOR THIS   • Seasonal allergies          Past Surgical History:   Procedure Laterality Date   • ANTERIOR AND POSTERIOR VAGINAL REPAIR  04/2010   • BREAST AUGMENTATION Bilateral 2015   • BREAST BIOPSY Left 12/20/2018    Procedure: BREAST BIOPSY;  Surgeon: Mike Adams MD;  Location: [facility] OR;  Service: General   • BREAST LUMPECTOMY WITH SENTINEL NODE BIOPSY Left 2/20/2019    Procedure: BREAST LUMPECTOMY WITH SENTINEL NODE BIOPSY;  Surgeon: Mike Adams MD;  Location: [facility] OR;  Service: General   • BUNIONECTOMY Bilateral     RIGHT       LEFT 2-2015   • HERNIA REPAIR Left 03/1994    INGUINAL    • HERNIA REPAIR Left 04/1996    FEMORAL   • HYSTERECTOMY  02/2001   • LUMBAR FUSION  07/11/2018    L 4-5         Current Outpatient Medications on File Prior to Visit   Medication Sig Dispense Refill   • coenzyme Q10 100 MG capsule Take 200 mg by mouth Daily.     • dicyclomine (BENTYL) 20 MG tablet Take 20 mg by mouth Every 6 (Six) Hours As Needed.     • Emollient (COLLAGEN EX) Take 6 capsules by mouth Daily.     • Flaxseed, Linseed, (FLAXSEED OIL) 1200 MG capsule Take 1,200 mg by mouth Daily.     • fluticasone (FLONASE) 50 MCG/ACT nasal spray 2 sprays by Each Nare route Daily.     • letrozole (FEMARA) 2.5 MG tablet Take 1 tablet by mouth Daily for 90 days. 30 tablet 6   • Milk Thistle 1000 MG capsule Take 240 mg by mouth Daily.     • Multiple Vitamins-Minerals (MULTIVITAMIN WITH MINERALS) tablet tablet Take 1 tablet by mouth Daily.     • tiZANidine (ZANAFLEX) 4 MG tablet Take 2 mg by mouth 2 (Two) Times a Day As Needed for Muscle Spasms.     • Unable to find 1 each 1 (One) Time. Med Name: sclerosing injections  7 injections every other week  1st injection was 2/4/19       No current facility-administered medications on  file prior to visit.        No Known Allergies      Referring Provider:    No referring provider defined for this encounter.    Oncologist:  No primary care provider on file.      Seen and approved by:  Jazmin Hernandez MD  04/25/2019

## 2019-04-26 PROCEDURE — 77387 GUIDANCE FOR RADJ TX DLVR: CPT | Performed by: RADIOLOGY

## 2019-04-26 PROCEDURE — 77412 RADIATION TX DELIVERY LVL 3: CPT | Performed by: RADIOLOGY

## 2019-04-26 PROCEDURE — 77336 RADIATION PHYSICS CONSULT: CPT | Performed by: RADIOLOGY

## 2019-04-29 ENCOUNTER — RADIATION ONCOLOGY WEEKLY ASSESSMENT (OUTPATIENT)
Dept: RADIATION ONCOLOGY | Facility: HOSPITAL | Age: 59
End: 2019-04-29

## 2019-04-29 ENCOUNTER — HOSPITAL ENCOUNTER (OUTPATIENT)
Dept: BONE DENSITY | Facility: HOSPITAL | Age: 59
Discharge: HOME | End: 2019-04-29
Admitting: INTERNAL MEDICINE

## 2019-04-29 VITALS
BODY MASS INDEX: 22.49 KG/M2 | DIASTOLIC BLOOD PRESSURE: 75 MMHG | SYSTOLIC BLOOD PRESSURE: 118 MMHG | TEMPERATURE: 97.5 F | OXYGEN SATURATION: 99 % | HEART RATE: 84 BPM | WEIGHT: 131 LBS

## 2019-04-29 DIAGNOSIS — Z17.0 MALIGNANT NEOPLASM OF CENTRAL PORTION OF LEFT BREAST IN FEMALE, ESTROGEN RECEPTOR POSITIVE (HCC): Primary | ICD-10-CM

## 2019-04-29 DIAGNOSIS — C50.112 MALIGNANT NEOPLASM OF CENTRAL PORTION OF LEFT BREAST IN FEMALE, ESTROGEN RECEPTOR POSITIVE (HCC): Primary | ICD-10-CM

## 2019-04-29 DIAGNOSIS — Z17.0 MALIGNANT NEOPLASM OF CENTRAL PORTION OF LEFT BREAST IN FEMALE, ESTROGEN RECEPTOR POSITIVE (HCC): ICD-10-CM

## 2019-04-29 DIAGNOSIS — C50.112 MALIGNANT NEOPLASM OF CENTRAL PORTION OF LEFT BREAST IN FEMALE, ESTROGEN RECEPTOR POSITIVE (HCC): ICD-10-CM

## 2019-04-29 PROCEDURE — 77080 DXA BONE DENSITY AXIAL: CPT

## 2019-04-29 PROCEDURE — 77387 GUIDANCE FOR RADJ TX DLVR: CPT | Performed by: RADIOLOGY

## 2019-04-29 PROCEDURE — 77412 RADIATION TX DELIVERY LVL 3: CPT | Performed by: RADIOLOGY

## 2019-04-29 PROCEDURE — 77427 RADIATION TX MANAGEMENT X5: CPT | Performed by: RADIOLOGY

## 2019-04-29 NOTE — PROGRESS NOTES
Physician Weekly Management Note    Diagnosis:     Diagnosis Plan   1. Malignant neoplasm of central portion of left breast in female, estrogen receptor positive (CMS/HCC)         RT Details:  fx 9/24 left breast plus boost  Notes on Treatment course, Films, Medical progress:  Doing well, much better today, not tearful. Seeing her gyn this week to discuss low libido, mild erythema over breast    Weekly Management:  Medication reviewed?   Yes  New medications given?   No  Problemlist reviewed?   Yes  Problem added?   No  Issues raised requiring referral to support services - task assigned to:  yakelin    Technical aspects reviewed:  Weekly OBI approved?   Yes  Weekly port films approved?   Yes  Change requests noted on port film?   No  Patient setup and plan reviewed?   Yes    Chart Reviewed:  Continue current treatment plan?   Yes  Treatment plan change requested?   No  CBC reviewed?   No  Concurrent Chemo?   No    Objective     Toxicities:   none     Review of Systems   Constitutional: Negative.    HENT: Negative.    Respiratory: Negative.    Skin: Positive for color change.          Vitals:    04/29/19 1136   BP: 118/75   Pulse: 84   Temp: 97.5 °F (36.4 °C)   SpO2: 99%       Current Status 3/27/2019   ECOG score 0       Physical Exam   Constitutional: She appears well-developed and well-nourished.   Pulmonary/Chest:   Mild erythema               Problem Summary List    Diagnosis:     Diagnosis Plan   1. Malignant neoplasm of central portion of left breast in female, estrogen receptor positive (CMS/HCC)       Pathology:   breast    Past Medical History:   Diagnosis Date   • Arthritis     BACK   • Breast lump     LEFT   • Cancer (CMS/HCC) 02/2019    BREAST LEFT   • Hip pain, left    • History of back pain    • History of colon polyp    • History of osteopenia    • IBS (irritable bowel syndrome)    • Mild scoliosis    • Numbness in feet     LEFT SECOND TOE, GETTING INJECTIONS FOR THIS   • Seasonal allergies          Past  Surgical History:   Procedure Laterality Date   • ANTERIOR AND POSTERIOR VAGINAL REPAIR  04/2010   • BREAST AUGMENTATION Bilateral 2015   • BREAST BIOPSY Left 12/20/2018    Procedure: BREAST BIOPSY;  Surgeon: Mike Adams MD;  Location: [facility] OR;  Service: General   • BREAST LUMPECTOMY WITH SENTINEL NODE BIOPSY Left 2/20/2019    Procedure: BREAST LUMPECTOMY WITH SENTINEL NODE BIOPSY;  Surgeon: Mike Adams MD;  Location: [facility] OR;  Service: General   • BUNIONECTOMY Bilateral     RIGHT       LEFT 2-2015   • HERNIA REPAIR Left 03/1994    INGUINAL    • HERNIA REPAIR Left 04/1996    FEMORAL   • HYSTERECTOMY  02/2001   • LUMBAR FUSION  07/11/2018    L 4-5         Current Outpatient Medications on File Prior to Visit   Medication Sig Dispense Refill   • coenzyme Q10 100 MG capsule Take 200 mg by mouth Daily.     • dicyclomine (BENTYL) 20 MG tablet Take 20 mg by mouth Every 6 (Six) Hours As Needed.     • Emollient (COLLAGEN EX) Take 6 capsules by mouth Daily.     • Flaxseed, Linseed, (FLAXSEED OIL) 1200 MG capsule Take 1,200 mg by mouth Daily.     • fluticasone (FLONASE) 50 MCG/ACT nasal spray 2 sprays by Each Nare route Daily.     • letrozole (FEMARA) 2.5 MG tablet Take 1 tablet by mouth Daily for 90 days. 30 tablet 6   • Milk Thistle 1000 MG capsule Take 240 mg by mouth Daily.     • Multiple Vitamins-Minerals (MULTIVITAMIN WITH MINERALS) tablet tablet Take 1 tablet by mouth Daily.     • tiZANidine (ZANAFLEX) 4 MG tablet Take 2 mg by mouth 2 (Two) Times a Day As Needed for Muscle Spasms.     • Unable to find 1 each 1 (One) Time. Med Name: sclerosing injections  7 injections every other week  1st injection was 2/4/19       No current facility-administered medications on file prior to visit.        No Known Allergies      Referring Provider:    No referring provider defined for this encounter.    Oncologist:  No primary care provider on file.      Seen and approved by:  Jazmin PALMER  MD David  04/29/2019

## 2019-04-30 PROCEDURE — 77412 RADIATION TX DELIVERY LVL 3: CPT | Performed by: RADIOLOGY

## 2019-04-30 PROCEDURE — 77387 GUIDANCE FOR RADJ TX DLVR: CPT | Performed by: RADIOLOGY

## 2019-05-01 ENCOUNTER — APPOINTMENT (OUTPATIENT)
Dept: RADIATION ONCOLOGY | Facility: HOSPITAL | Age: 59
End: 2019-05-01

## 2019-05-01 PROCEDURE — 77387 GUIDANCE FOR RADJ TX DLVR: CPT | Performed by: RADIOLOGY

## 2019-05-01 PROCEDURE — 77412 RADIATION TX DELIVERY LVL 3: CPT | Performed by: RADIOLOGY

## 2019-05-02 PROCEDURE — 77412 RADIATION TX DELIVERY LVL 3: CPT | Performed by: RADIOLOGY

## 2019-05-02 PROCEDURE — 77387 GUIDANCE FOR RADJ TX DLVR: CPT | Performed by: RADIOLOGY

## 2019-05-03 PROCEDURE — 77412 RADIATION TX DELIVERY LVL 3: CPT | Performed by: RADIOLOGY

## 2019-05-03 PROCEDURE — 77336 RADIATION PHYSICS CONSULT: CPT | Performed by: RADIOLOGY

## 2019-05-03 PROCEDURE — 77387 GUIDANCE FOR RADJ TX DLVR: CPT | Performed by: RADIOLOGY

## 2019-05-06 ENCOUNTER — RADIATION ONCOLOGY WEEKLY ASSESSMENT (OUTPATIENT)
Dept: RADIATION ONCOLOGY | Facility: HOSPITAL | Age: 59
End: 2019-05-06

## 2019-05-06 DIAGNOSIS — C50.112 MALIGNANT NEOPLASM OF CENTRAL PORTION OF LEFT BREAST IN FEMALE, ESTROGEN RECEPTOR POSITIVE (HCC): Primary | ICD-10-CM

## 2019-05-06 DIAGNOSIS — Z17.0 MALIGNANT NEOPLASM OF CENTRAL PORTION OF LEFT BREAST IN FEMALE, ESTROGEN RECEPTOR POSITIVE (HCC): Primary | ICD-10-CM

## 2019-05-06 PROCEDURE — 77321 SPECIAL TELETX PORT PLAN: CPT | Performed by: RADIOLOGY

## 2019-05-06 PROCEDURE — 77280 THER RAD SIMULAJ FIELD SMPL: CPT | Performed by: RADIOLOGY

## 2019-05-06 PROCEDURE — 77427 RADIATION TX MANAGEMENT X5: CPT | Performed by: RADIOLOGY

## 2019-05-06 PROCEDURE — 77334 RADIATION TREATMENT AID(S): CPT | Performed by: RADIOLOGY

## 2019-05-06 PROCEDURE — 77412 RADIATION TX DELIVERY LVL 3: CPT | Performed by: RADIOLOGY

## 2019-05-06 NOTE — PROGRESS NOTES
Physician Weekly Management Note    Diagnosis:     Diagnosis Plan   1. Malignant neoplasm of central portion of left breast in female, estrogen receptor positive (CMS/HCC)         RT Details:  fx 16/24 left breast plus boost x 6    Notes on Treatment course, Films, Medical progress:  Doing well, mild erythema over left breast, mild fatigue    Weekly Management:  Medication reviewed?   Yes  New medications given?   No  Problemlist reviewed?   Yes  Problem added?   No  Issues raised requiring referral to support services - task assigned to:  yakelin    Technical aspects reviewed:  Weekly OBI approved?   Yes  Weekly port films approved?   Yes  Change requests noted on port film?   No  Patient setup and plan reviewed?   Yes    Chart Reviewed:  Continue current treatment plan?   Yes  Treatment plan change requested?   No  CBC reviewed?   No  Concurrent Chemo?   No    Objective     Toxicities:   Grade 1 erythema     Review of Systems   Constitutional: Positive for fatigue.   HENT: Negative.    Skin: Positive for rash.          There were no vitals filed for this visit.    Current Status 3/27/2019   ECOG score 0       Physical Exam   Constitutional: She appears well-developed and well-nourished.   HENT:   Head: Normocephalic and atraumatic.   Pulmonary/Chest:   Very minimal erythema               Problem Summary List    Diagnosis:     Diagnosis Plan   1. Malignant neoplasm of central portion of left breast in female, estrogen receptor positive (CMS/HCC)       Pathology:   Breast ductal    Past Medical History:   Diagnosis Date   • Arthritis     BACK   • Breast lump     LEFT   • Cancer (CMS/HCC) 02/2019    BREAST LEFT   • Hip pain, left    • History of back pain    • History of colon polyp    • History of osteopenia    • IBS (irritable bowel syndrome)    • Mild scoliosis    • Numbness in feet     LEFT SECOND TOE, GETTING INJECTIONS FOR THIS   • Seasonal allergies          Past Surgical History:   Procedure Laterality Date   •  ANTERIOR AND POSTERIOR VAGINAL REPAIR  04/2010   • BREAST AUGMENTATION Bilateral 2015   • BREAST BIOPSY Left 12/20/2018    Procedure: BREAST BIOPSY;  Surgeon: Mike Adams MD;  Location: [facility] OR;  Service: General   • BREAST LUMPECTOMY WITH SENTINEL NODE BIOPSY Left 2/20/2019    Procedure: BREAST LUMPECTOMY WITH SENTINEL NODE BIOPSY;  Surgeon: Mike Adams MD;  Location: [facility] OR;  Service: General   • BUNIONECTOMY Bilateral     RIGHT       LEFT 2-2015   • HERNIA REPAIR Left 03/1994    INGUINAL    • HERNIA REPAIR Left 04/1996    FEMORAL   • HYSTERECTOMY  02/2001   • LUMBAR FUSION  07/11/2018    L 4-5         Current Outpatient Medications on File Prior to Visit   Medication Sig Dispense Refill   • coenzyme Q10 100 MG capsule Take 200 mg by mouth Daily.     • dicyclomine (BENTYL) 20 MG tablet Take 20 mg by mouth Every 6 (Six) Hours As Needed.     • Emollient (COLLAGEN EX) Take 6 capsules by mouth Daily.     • Flaxseed, Linseed, (FLAXSEED OIL) 1200 MG capsule Take 1,200 mg by mouth Daily.     • fluticasone (FLONASE) 50 MCG/ACT nasal spray 2 sprays by Each Nare route Daily.     • letrozole (FEMARA) 2.5 MG tablet Take 1 tablet by mouth Daily for 90 days. 30 tablet 6   • Milk Thistle 1000 MG capsule Take 240 mg by mouth Daily.     • Multiple Vitamins-Minerals (MULTIVITAMIN WITH MINERALS) tablet tablet Take 1 tablet by mouth Daily.     • tiZANidine (ZANAFLEX) 4 MG tablet Take 2 mg by mouth 2 (Two) Times a Day As Needed for Muscle Spasms.     • Unable to find 1 each 1 (One) Time. Med Name: sclerosing injections  7 injections every other week  1st injection was 2/4/19       No current facility-administered medications on file prior to visit.        No Known Allergies      Referring Provider:    No referring provider defined for this encounter.    Oncologist:  No primary care provider on file.      Seen and approved by:  Jazmin Hernandez MD  05/06/2019

## 2019-05-07 PROCEDURE — 77387 GUIDANCE FOR RADJ TX DLVR: CPT | Performed by: RADIOLOGY

## 2019-05-07 PROCEDURE — 77412 RADIATION TX DELIVERY LVL 3: CPT | Performed by: RADIOLOGY

## 2019-05-08 PROCEDURE — 77387 GUIDANCE FOR RADJ TX DLVR: CPT | Performed by: RADIOLOGY

## 2019-05-08 PROCEDURE — 77412 RADIATION TX DELIVERY LVL 3: CPT | Performed by: RADIOLOGY

## 2019-05-08 PROCEDURE — 77331 SPECIAL RADIATION DOSIMETRY: CPT | Performed by: RADIOLOGY

## 2019-05-09 PROCEDURE — 77387 GUIDANCE FOR RADJ TX DLVR: CPT | Performed by: RADIOLOGY

## 2019-05-09 PROCEDURE — 77336 RADIATION PHYSICS CONSULT: CPT | Performed by: RADIOLOGY

## 2019-05-09 PROCEDURE — 77412 RADIATION TX DELIVERY LVL 3: CPT | Performed by: RADIOLOGY

## 2019-05-10 ENCOUNTER — OFFICE VISIT (OUTPATIENT)
Dept: MAMMOGRAPHY | Facility: CLINIC | Age: 59
End: 2019-05-10

## 2019-05-10 DIAGNOSIS — Z17.0 MALIGNANT NEOPLASM OF CENTRAL PORTION OF LEFT BREAST IN FEMALE, ESTROGEN RECEPTOR POSITIVE (HCC): Primary | ICD-10-CM

## 2019-05-10 DIAGNOSIS — C50.112 MALIGNANT NEOPLASM OF CENTRAL PORTION OF LEFT BREAST IN FEMALE, ESTROGEN RECEPTOR POSITIVE (HCC): Primary | ICD-10-CM

## 2019-05-10 PROCEDURE — 77412 RADIATION TX DELIVERY LVL 3: CPT | Performed by: RADIOLOGY

## 2019-05-10 PROCEDURE — 77300 RADIATION THERAPY DOSE PLAN: CPT | Performed by: RADIOLOGY

## 2019-05-10 PROCEDURE — 99024 POSTOP FOLLOW-UP VISIT: CPT | Performed by: SURGERY

## 2019-05-10 PROCEDURE — 77387 GUIDANCE FOR RADJ TX DLVR: CPT | Performed by: RADIOLOGY

## 2019-05-10 NOTE — PROGRESS NOTES
Chief Complaint: Alesha Eldridge is a  58 y.o. female, initially referred by No ref. provider found , who is here today for a postoperative visit.    History of Present Illness:  In the interim,Alesha Eldridge has just about completed her radiation treatment.  She denies any problems with her incision.    She has noted no redness, warmth,drainage, swelling at the incision site. Denies fever or chills.      Current Outpatient Medications:   •  coenzyme Q10 100 MG capsule, Take 200 mg by mouth Daily., Disp: , Rfl:   •  dicyclomine (BENTYL) 20 MG tablet, Take 20 mg by mouth Every 6 (Six) Hours As Needed., Disp: , Rfl:   •  Emollient (COLLAGEN EX), Take 6 capsules by mouth Daily., Disp: , Rfl:   •  Flaxseed, Linseed, (FLAXSEED OIL) 1200 MG capsule, Take 1,200 mg by mouth Daily., Disp: , Rfl:   •  fluticasone (FLONASE) 50 MCG/ACT nasal spray, 2 sprays by Each Nare route Daily., Disp: , Rfl:   •  letrozole (FEMARA) 2.5 MG tablet, Take 1 tablet by mouth Daily for 90 days., Disp: 30 tablet, Rfl: 6  •  Milk Thistle 1000 MG capsule, Take 240 mg by mouth Daily., Disp: , Rfl:   •  Multiple Vitamins-Minerals (MULTIVITAMIN WITH MINERALS) tablet tablet, Take 1 tablet by mouth Daily., Disp: , Rfl:   •  tiZANidine (ZANAFLEX) 4 MG tablet, Take 2 mg by mouth 2 (Two) Times a Day As Needed for Muscle Spasms., Disp: , Rfl:   •  Unable to find, 1 each 1 (One) Time. Med Name: sclerosing injections 7 injections every other week 1st injection was 2/4/19, Disp: , Rfl:   Physical examination  Left breast- the skin is reddened from her radiation.  The nipple areolar complex has been surgically removed but the incision itself looks fine.  Assessment:  Left breast cancer status post breast conserving surgery with a central lumpectomy.  She is healing well and has just about completed radiation.  She has already started on hormone blocking therapy and continues to follow with the medical oncologist.    Plan:  I will see her on an as-needed  basis.          EMR Dragon/transcription disclaimer:    Much of this encounter note is an electronic transcription/translocation of spoken language to printed text.  The electronic translation of spoken language may permit erroneous, or at times, nonsensical words or phrases to be inadvertently transcribed.  Although I have reviewed the note from such areas, some may still exist.

## 2019-05-13 ENCOUNTER — RADIATION ONCOLOGY WEEKLY ASSESSMENT (OUTPATIENT)
Dept: RADIATION ONCOLOGY | Facility: HOSPITAL | Age: 59
End: 2019-05-13

## 2019-05-13 VITALS
OXYGEN SATURATION: 100 % | WEIGHT: 131 LBS | HEART RATE: 76 BPM | TEMPERATURE: 97 F | BODY MASS INDEX: 22.49 KG/M2 | DIASTOLIC BLOOD PRESSURE: 82 MMHG | SYSTOLIC BLOOD PRESSURE: 125 MMHG

## 2019-05-13 DIAGNOSIS — C50.112 MALIGNANT NEOPLASM OF CENTRAL PORTION OF LEFT BREAST IN FEMALE, ESTROGEN RECEPTOR POSITIVE (HCC): Primary | ICD-10-CM

## 2019-05-13 DIAGNOSIS — Z17.0 MALIGNANT NEOPLASM OF CENTRAL PORTION OF LEFT BREAST IN FEMALE, ESTROGEN RECEPTOR POSITIVE (HCC): Primary | ICD-10-CM

## 2019-05-13 PROCEDURE — 77427 RADIATION TX MANAGEMENT X5: CPT | Performed by: RADIOLOGY

## 2019-05-13 PROCEDURE — 77412 RADIATION TX DELIVERY LVL 3: CPT | Performed by: RADIOLOGY

## 2019-05-13 PROCEDURE — 77387 GUIDANCE FOR RADJ TX DLVR: CPT | Performed by: RADIOLOGY

## 2019-05-13 NOTE — PROGRESS NOTES
Physician Weekly Management Note    Diagnosis:     Diagnosis Plan   1. Malignant neoplasm of central portion of left breast in female, estrogen receptor positive (CMS/HCC)         RT Details:  fx 21/30 left breast 21/24 tangents    Notes on Treatment course, Films, Medical progress:  Doing ok, moderate erythema over left breast, will recheck skin Thursday prior to tx, told her tostop using aquaphor prior to tx,     Weekly Management:  Medication reviewed?   Yes  New medications given?   Yes  Problemlist reviewed?   Yes  Problem added?   No  Issues raised requiring referral to support services - task assigned to:  yaeklin    Technical aspects reviewed:  Weekly OBI approved?   Yes  Weekly port films approved?   Yes  Change requests noted on port film?   No  Patient setup and plan reviewed?   Yes    Chart Reviewed:  Continue current treatment plan?   Yes  Treatment plan change requested?   No  CBC reviewed?   No  Concurrent Chemo?   No    Objective     Toxicities:   Grade 2 erythema     Review of Systems   Constitutional: Negative.    Skin: Positive for color change.          Vitals:    05/13/19 1100   BP: 125/82   Pulse: 76   Temp: 97 °F (36.1 °C)   SpO2: 100%       Current Status 3/27/2019   ECOG score 0       Physical Exam   Constitutional: She appears well-developed and well-nourished.   Pulmonary/Chest:   Moderate erythema over left breast               Problem Summary List    Diagnosis:     Diagnosis Plan   1. Malignant neoplasm of central portion of left breast in female, estrogen receptor positive (CMS/HCC)       Pathology:   breast    Past Medical History:   Diagnosis Date   • Arthritis     BACK   • Breast lump     LEFT   • Cancer (CMS/HCC) 02/2019    BREAST LEFT   • Hip pain, left    • History of back pain    • History of colon polyp    • History of osteopenia    • IBS (irritable bowel syndrome)    • Mild scoliosis    • Numbness in feet     LEFT SECOND TOE, GETTING INJECTIONS FOR THIS   • Seasonal allergies             Past Surgical History:   Procedure Laterality Date   • ANTERIOR AND POSTERIOR VAGINAL REPAIR  04/2010   • BREAST AUGMENTATION Bilateral 2015   • BREAST BIOPSY Left 12/20/2018    Procedure: BREAST BIOPSY;  Surgeon: Mike Adams MD;  Location: [facility] OR;  Service: General   • BREAST LUMPECTOMY WITH SENTINEL NODE BIOPSY Left 2/20/2019    Procedure: BREAST LUMPECTOMY WITH SENTINEL NODE BIOPSY;  Surgeon: Mike Adams MD;  Location: [facility] OR;  Service: General   • BUNIONECTOMY Bilateral     RIGHT       LEFT 2-2015   • HERNIA REPAIR Left 03/1994    INGUINAL    • HERNIA REPAIR Left 04/1996    FEMORAL   • HYSTERECTOMY  02/2001   • LUMBAR FUSION  07/11/2018    L 4-5         Current Outpatient Medications on File Prior to Visit   Medication Sig Dispense Refill   • coenzyme Q10 100 MG capsule Take 200 mg by mouth Daily.     • dicyclomine (BENTYL) 20 MG tablet Take 20 mg by mouth Every 6 (Six) Hours As Needed.     • Emollient (COLLAGEN EX) Take 6 capsules by mouth Daily.     • Flaxseed, Linseed, (FLAXSEED OIL) 1200 MG capsule Take 1,200 mg by mouth Daily.     • fluticasone (FLONASE) 50 MCG/ACT nasal spray 2 sprays by Each Nare route Daily.     • letrozole (FEMARA) 2.5 MG tablet Take 1 tablet by mouth Daily for 90 days. 30 tablet 6   • Milk Thistle 1000 MG capsule Take 240 mg by mouth Daily.     • Multiple Vitamins-Minerals (MULTIVITAMIN WITH MINERALS) tablet tablet Take 1 tablet by mouth Daily.     • tiZANidine (ZANAFLEX) 4 MG tablet Take 2 mg by mouth 2 (Two) Times a Day As Needed for Muscle Spasms.     • Unable to find 1 each 1 (One) Time. Med Name: sclerosing injections  7 injections every other week  1st injection was 2/4/19       No current facility-administered medications on file prior to visit.        No Known Allergies      Referring Provider:    No referring provider defined for this encounter.    Oncologist:  No primary care provider on file.      Seen and approved by:  Jazmin  SPENCER Hernandez MD  05/13/2019

## 2019-05-14 PROCEDURE — 77412 RADIATION TX DELIVERY LVL 3: CPT | Performed by: RADIOLOGY

## 2019-05-14 PROCEDURE — 77387 GUIDANCE FOR RADJ TX DLVR: CPT | Performed by: RADIOLOGY

## 2019-05-15 PROCEDURE — 77412 RADIATION TX DELIVERY LVL 3: CPT | Performed by: RADIOLOGY

## 2019-05-15 PROCEDURE — 77387 GUIDANCE FOR RADJ TX DLVR: CPT | Performed by: RADIOLOGY

## 2019-05-16 ENCOUNTER — RADIATION ONCOLOGY WEEKLY ASSESSMENT (OUTPATIENT)
Dept: RADIATION ONCOLOGY | Facility: HOSPITAL | Age: 59
End: 2019-05-16

## 2019-05-16 DIAGNOSIS — C50.112 MALIGNANT NEOPLASM OF CENTRAL PORTION OF LEFT BREAST IN FEMALE, ESTROGEN RECEPTOR POSITIVE (HCC): Primary | ICD-10-CM

## 2019-05-16 DIAGNOSIS — Z17.0 MALIGNANT NEOPLASM OF CENTRAL PORTION OF LEFT BREAST IN FEMALE, ESTROGEN RECEPTOR POSITIVE (HCC): Primary | ICD-10-CM

## 2019-05-16 NOTE — PROGRESS NOTES
Physician Weekly Management Note    Diagnosis:     Diagnosis Plan   1. Malignant neoplasm of central portion of left breast in female, estrogen receptor positive (CMS/HCC)         RT Details:  fx 23/24 left breast tangents plus boost x 5-6    Notes on Treatment course, Films, Medical progress:  Doing ok, increased fatigue, moderate erythema over left breast, will hold tx today and thru Monday, resume Tuesday, May 21    Weekly Management:  Medication reviewed?   Yes  New medications given?   No  Problemlist reviewed?   Yes  Problem added?   No  Issues raised requiring referral to support services - task assigned to:  yakelin    Technical aspects reviewed:  Weekly OBI approved?   Yes  Weekly port films approved?   Yes  Change requests noted on port film?   No  Patient setup and plan reviewed?   Yes    Chart Reviewed:  Continue current treatment plan?   Yes  Treatment plan change requested?   No  CBC reviewed?   Yes  Concurrent Chemo?   No    Objective     Toxicities:   Grade 2 erythema     Review of Systems   Constitutional: Positive for fatigue.   HENT: Negative.    Respiratory: Negative.    Psychiatric/Behavioral: Negative.           There were no vitals filed for this visit.    Current Status 3/27/2019   ECOG score 0       Physical Exam   Constitutional: She appears well-developed and well-nourished.   Pulmonary/Chest:   Moderate erythema               Problem Summary List    Diagnosis:     Diagnosis Plan   1. Malignant neoplasm of central portion of left breast in female, estrogen receptor positive (CMS/HCC)       Pathology:   breast    Past Medical History:   Diagnosis Date   • Arthritis     BACK   • Breast lump     LEFT   • Cancer (CMS/HCC) 02/2019    BREAST LEFT   • Hip pain, left    • History of back pain    • History of colon polyp    • History of osteopenia    • IBS (irritable bowel syndrome)    • Mild scoliosis    • Numbness in feet     LEFT SECOND TOE, GETTING INJECTIONS FOR THIS   • Seasonal allergies               Past Surgical History:   Procedure Laterality Date   • ANTERIOR AND POSTERIOR VAGINAL REPAIR  04/2010   • BREAST AUGMENTATION Bilateral 2015   • BREAST BIOPSY Left 12/20/2018    Procedure: BREAST BIOPSY;  Surgeon: Mike Adams MD;  Location: [facility] OR;  Service: General   • BREAST LUMPECTOMY WITH SENTINEL NODE BIOPSY Left 2/20/2019    Procedure: BREAST LUMPECTOMY WITH SENTINEL NODE BIOPSY;  Surgeon: Mike Adams MD;  Location: [facility] OR;  Service: General   • BUNIONECTOMY Bilateral     RIGHT       LEFT 2-2015   • HERNIA REPAIR Left 03/1994    INGUINAL    • HERNIA REPAIR Left 04/1996    FEMORAL   • HYSTERECTOMY  02/2001   • LUMBAR FUSION  07/11/2018    L 4-5         Current Outpatient Medications on File Prior to Visit   Medication Sig Dispense Refill   • coenzyme Q10 100 MG capsule Take 200 mg by mouth Daily.     • dicyclomine (BENTYL) 20 MG tablet Take 20 mg by mouth Every 6 (Six) Hours As Needed.     • Emollient (COLLAGEN EX) Take 6 capsules by mouth Daily.     • Flaxseed, Linseed, (FLAXSEED OIL) 1200 MG capsule Take 1,200 mg by mouth Daily.     • fluticasone (FLONASE) 50 MCG/ACT nasal spray 2 sprays by Each Nare route Daily.     • letrozole (FEMARA) 2.5 MG tablet Take 1 tablet by mouth Daily for 90 days. 30 tablet 6   • Milk Thistle 1000 MG capsule Take 240 mg by mouth Daily.     • Multiple Vitamins-Minerals (MULTIVITAMIN WITH MINERALS) tablet tablet Take 1 tablet by mouth Daily.     • tiZANidine (ZANAFLEX) 4 MG tablet Take 2 mg by mouth 2 (Two) Times a Day As Needed for Muscle Spasms.     • Unable to find 1 each 1 (One) Time. Med Name: sclerosing injections  7 injections every other week  1st injection was 2/4/19       No current facility-administered medications on file prior to visit.        No Known Allergies      Referring Provider:    No referring provider defined for this encounter.    Oncologist:  No primary care provider on file.      Seen and approved by:  Jazmin  SPENCER Hernandez MD  05/16/2019

## 2019-05-17 PROCEDURE — 77336 RADIATION PHYSICS CONSULT: CPT | Performed by: RADIOLOGY

## 2019-05-21 ENCOUNTER — RADIATION ONCOLOGY WEEKLY ASSESSMENT (OUTPATIENT)
Dept: RADIATION ONCOLOGY | Facility: HOSPITAL | Age: 59
End: 2019-05-21

## 2019-05-21 DIAGNOSIS — C50.112 MALIGNANT NEOPLASM OF CENTRAL PORTION OF LEFT BREAST IN FEMALE, ESTROGEN RECEPTOR POSITIVE (HCC): Primary | ICD-10-CM

## 2019-05-21 DIAGNOSIS — Z17.0 MALIGNANT NEOPLASM OF CENTRAL PORTION OF LEFT BREAST IN FEMALE, ESTROGEN RECEPTOR POSITIVE (HCC): Primary | ICD-10-CM

## 2019-05-21 PROCEDURE — 77387 GUIDANCE FOR RADJ TX DLVR: CPT | Performed by: RADIOLOGY

## 2019-05-21 PROCEDURE — 77412 RADIATION TX DELIVERY LVL 3: CPT | Performed by: RADIOLOGY

## 2019-05-21 NOTE — PROGRESS NOTES
Physician Weekly Management Note    Diagnosis:     1. Malignant neoplasm of central portion of left breast in female, estrogen receptor positive (CMS/HCC)      Reason for Visit: Radiation (25/30)    Concurrent Chemo:   No    Notes on Treatment course, Films, Medical progress and Plan:  Doing fairly well. Brisk erythema over treatment area but no desquam. She states it is significantly improved. Ok to resume.     ROS - Other than as listed above, as follow:  Constitutional - Normal - Denies lack of appetite, fatigue, fever, night sweats and change in weight.  Neck - Normal - Denies neck masses, muscle weakness, neck pain, decreased range of motion and swelling of the neck.  Breasts - Normal - Denies breast masses, nipple discharge, nipple inversion and pain.  Respiratory - Normal - Denies cough, dyspnea, hemoptysis, hiccoughs, pleuritic chest pain and wheezing.  Gastrointestinal - Normal - Denies abdominal pain, constipation, diarrhea, heartburn / dyspepsia, hematemesis, hemorrhoids, melena / GI bleeding, nausea, pain / cramping, satiety and vomiting.  Musculoskeletal - Normal - Denies arthritis, bone pain, joint pain, muscle weakness and decreased range of motion.   Hematologic/Lymphatic - Normal - Denies easy bruising and tender or enlarged lymph nodes.    PYHSICAL EXAM - Other than listed above, as follows:  Vitals:    05/21/19 1103   PainSc:   6   PainLoc: Breast       Constitutional - Normal - No evidence of impaired alertness, inadequate appearance, premature or advanced chronologic age, uncooperativeness, altered mood and affect and disorientation.  Neck - Normal - No evidence of tender or enlarged lymph nodes, neck abnormalities, restricted range of motion and enlarged thyroid gland.  Breasts - Normal - No change in nipple or incision site.  Chest - Normal - No evidence of chest abnormalities and tender or enlarged lymph nodes.  Hematologic/Lymphatic -  Normal - No evidence of tender or enlarged axillae lymph  "nodes and tender or enlarged neck lymph nodes.    Performance Status: (1) Restricted in physically strenuous activity, ambulatory and able to do work of light nature  Problem added:  No problems updated.  Medications added: No orders of the defined types were placed in this encounter.    Ancillary referrals made: No orders of the defined types were placed in this encounter.      Technical aspects reviewed:  Weekly OBI approved if applicable? Yes  Weekly port films approved?   Yes  Change requests noted if applicable? Yes  Patient setup and plan reviewed?  Yes    Chart Reviewed:  Continue current treatment plan?   Yes  Treatment plan change requested?  No    I have reviewed and marked as \"reviewed\" the current medications, allergies and problem list in the patients EMR.    I have reviewed the patient's medical, surgical  history in detail, reviewed any pertinent lab work  and updated the computerized patient record if needed.    Patient's Care Team:  Patient Care Team:  Irineo Laguerre MD as PCP - General (Family Medicine)  Nereida Black MD as Consulting Physician (Obstetrics and Gynecology)  Mike Adams MD as Referring Physician (Breast Surgery)  Jairo Malik MD as Consulting Physician (Hematology and Oncology)  Jazmin Hernandez MD as Consulting Physician (Radiation Oncology)      "

## 2019-05-22 ENCOUNTER — TELEPHONE (OUTPATIENT)
Dept: OTHER | Facility: HOSPITAL | Age: 59
End: 2019-05-22

## 2019-05-22 NOTE — TELEPHONE ENCOUNTER
Oncology Social Work  [facility]    Referral received from Laurence Martinez RN in Radiation Center.  Patient requesting call from OSW for supportive counseling.  OSW called and spoke to patient over the phone.  Mrs. Eldridge states that she is doing better emotionally and doesn't feel like she needs counseling at this time.  She is interested in massage therapy.  Provided information on how to go about scheduling.   Mailed her my contact letter and encouraged to call if needs arise in the future.    Thank you  Kitty Ashford, DAISYW, CSW, OSW-C

## 2019-05-23 PROCEDURE — 77412 RADIATION TX DELIVERY LVL 3: CPT | Performed by: RADIOLOGY

## 2019-05-23 PROCEDURE — 77280 THER RAD SIMULAJ FIELD SMPL: CPT | Performed by: RADIOLOGY

## 2019-05-24 PROCEDURE — 77412 RADIATION TX DELIVERY LVL 3: CPT | Performed by: RADIOLOGY

## 2019-05-24 PROCEDURE — 77427 RADIATION TX MANAGEMENT X5: CPT | Performed by: RADIOLOGY

## 2019-05-26 PROCEDURE — 77336 RADIATION PHYSICS CONSULT: CPT | Performed by: RADIOLOGY

## 2019-05-28 ENCOUNTER — RADIATION ONCOLOGY WEEKLY ASSESSMENT (OUTPATIENT)
Dept: RADIATION ONCOLOGY | Facility: HOSPITAL | Age: 59
End: 2019-05-28

## 2019-05-28 VITALS
DIASTOLIC BLOOD PRESSURE: 72 MMHG | TEMPERATURE: 97.2 F | OXYGEN SATURATION: 97 % | HEIGHT: 64 IN | HEART RATE: 73 BPM | SYSTOLIC BLOOD PRESSURE: 121 MMHG | BODY MASS INDEX: 22.36 KG/M2 | WEIGHT: 131 LBS

## 2019-05-28 DIAGNOSIS — Z17.0 MALIGNANT NEOPLASM OF CENTRAL PORTION OF LEFT BREAST IN FEMALE, ESTROGEN RECEPTOR POSITIVE (HCC): Primary | ICD-10-CM

## 2019-05-28 DIAGNOSIS — C50.112 MALIGNANT NEOPLASM OF CENTRAL PORTION OF LEFT BREAST IN FEMALE, ESTROGEN RECEPTOR POSITIVE (HCC): Primary | ICD-10-CM

## 2019-05-28 PROCEDURE — 77412 RADIATION TX DELIVERY LVL 3: CPT | Performed by: RADIOLOGY

## 2019-05-28 NOTE — PROGRESS NOTES
Physician Weekly Management Note    Diagnosis:     Diagnosis Plan   1. Malignant neoplasm of central portion of left breast in female, estrogen receptor positive (CMS/HCC)         RT Details:  fx 28 left breast on boost tumor bed  Notes on Treatment course, Films, Medical progress:  Moderate erythema over left breast, no skin breakdown    Weekly Management:  Medication reviewed?   Yes  New medications given?   No  Problemlist reviewed?   Yes  Problem added?   No  Issues raised requiring referral to support services - task assigned to:  na    Technical aspects reviewed:  Weekly OBI approved?   Yes  Weekly port films approved?   Yes  Change requests noted on port film?   No  Patient setup and plan reviewed?   Yes    Chart Reviewed:  Continue current treatment plan?   Yes  Treatment plan change requested?   No  CBC reviewed?   No  Concurrent Chemo?   No    Objective     Toxicities:   Grade 2 erythema     Review of Systems   Constitutional: Negative.    HENT: Negative.    Skin: Positive for color change.   Psychiatric/Behavioral: Negative.           Vitals:    05/28/19 1120   BP: 121/72   Pulse: 73   Temp: 97.2 °F (36.2 °C)   SpO2: 97%       Current Status 3/27/2019   ECOG score 0       Physical Exam   Constitutional: She appears well-developed and well-nourished.   HENT:   Head: Atraumatic.   Pulmonary/Chest:   moderte erythema               Problem Summary List    Diagnosis:     Diagnosis Plan   1. Malignant neoplasm of central portion of left breast in female, estrogen receptor positive (CMS/HCC)       Pathology:   Breast ductal    Past Medical History:   Diagnosis Date   • Arthritis     BACK   • Breast lump     LEFT   • Cancer (CMS/HCC) 02/2019    BREAST LEFT   • Hip pain, left    • History of back pain    • History of colon polyp    • History of osteopenia    • IBS (irritable bowel syndrome)    • Mild scoliosis    • Numbness in feet     LEFT SECOND TOE, GETTING INJECTIONS FOR THIS   • Seasonal allergies               Past Surgical History:   Procedure Laterality Date   • ANTERIOR AND POSTERIOR VAGINAL REPAIR  04/2010   • BREAST AUGMENTATION Bilateral 2015   • BREAST BIOPSY Left 12/20/2018    Procedure: BREAST BIOPSY;  Surgeon: Mike Adams MD;  Location: [facility] OR;  Service: General   • BREAST LUMPECTOMY WITH SENTINEL NODE BIOPSY Left 2/20/2019    Procedure: BREAST LUMPECTOMY WITH SENTINEL NODE BIOPSY;  Surgeon: Mike Adams MD;  Location: [facility] OR;  Service: General   • BUNIONECTOMY Bilateral     RIGHT       LEFT 2-2015   • HERNIA REPAIR Left 03/1994    INGUINAL    • HERNIA REPAIR Left 04/1996    FEMORAL   • HYSTERECTOMY  02/2001   • LUMBAR FUSION  07/11/2018    L 4-5         Current Outpatient Medications on File Prior to Visit   Medication Sig Dispense Refill   • coenzyme Q10 100 MG capsule Take 200 mg by mouth Daily.     • dicyclomine (BENTYL) 20 MG tablet Take 20 mg by mouth Every 6 (Six) Hours As Needed.     • Emollient (COLLAGEN EX) Take 6 capsules by mouth Daily.     • Flaxseed, Linseed, (FLAXSEED OIL) 1200 MG capsule Take 1,200 mg by mouth Daily.     • fluticasone (FLONASE) 50 MCG/ACT nasal spray 2 sprays by Each Nare route Daily.     • letrozole (FEMARA) 2.5 MG tablet Take 1 tablet by mouth Daily for 90 days. 30 tablet 6   • Milk Thistle 1000 MG capsule Take 240 mg by mouth Daily.     • Multiple Vitamins-Minerals (MULTIVITAMIN WITH MINERALS) tablet tablet Take 1 tablet by mouth Daily.     • tiZANidine (ZANAFLEX) 4 MG tablet Take 2 mg by mouth 2 (Two) Times a Day As Needed for Muscle Spasms.     • Unable to find 1 each 1 (One) Time. Med Name: sclerosing injections  7 injections every other week  1st injection was 2/4/19       No current facility-administered medications on file prior to visit.        No Known Allergies      Referring Provider:    No referring provider defined for this encounter.    Oncologist:  No primary care provider on file.      Seen and approved by:  Jazmin  SPENCER Hernandez MD  05/28/2019

## 2019-05-29 PROCEDURE — 77412 RADIATION TX DELIVERY LVL 3: CPT | Performed by: RADIOLOGY

## 2019-05-30 DIAGNOSIS — Z17.0 MALIGNANT NEOPLASM OF CENTRAL PORTION OF LEFT BREAST IN FEMALE, ESTROGEN RECEPTOR POSITIVE (HCC): Primary | ICD-10-CM

## 2019-05-30 DIAGNOSIS — C50.112 MALIGNANT NEOPLASM OF CENTRAL PORTION OF LEFT BREAST IN FEMALE, ESTROGEN RECEPTOR POSITIVE (HCC): Primary | ICD-10-CM

## 2019-05-30 PROCEDURE — 77412 RADIATION TX DELIVERY LVL 3: CPT | Performed by: RADIOLOGY

## 2019-06-04 ENCOUNTER — LAB (OUTPATIENT)
Dept: LAB | Facility: HOSPITAL | Age: 59
End: 2019-06-04

## 2019-06-04 ENCOUNTER — OFFICE VISIT (OUTPATIENT)
Dept: ONCOLOGY | Facility: CLINIC | Age: 59
End: 2019-06-04

## 2019-06-04 VITALS
WEIGHT: 129.4 LBS | SYSTOLIC BLOOD PRESSURE: 120 MMHG | RESPIRATION RATE: 16 BRPM | OXYGEN SATURATION: 99 % | HEIGHT: 64 IN | TEMPERATURE: 98 F | BODY MASS INDEX: 22.09 KG/M2 | HEART RATE: 72 BPM | DIASTOLIC BLOOD PRESSURE: 70 MMHG

## 2019-06-04 DIAGNOSIS — Z17.0 MALIGNANT NEOPLASM OF CENTRAL PORTION OF LEFT BREAST IN FEMALE, ESTROGEN RECEPTOR POSITIVE (HCC): Primary | ICD-10-CM

## 2019-06-04 DIAGNOSIS — C50.112 MALIGNANT NEOPLASM OF CENTRAL PORTION OF LEFT BREAST IN FEMALE, ESTROGEN RECEPTOR POSITIVE (HCC): Primary | ICD-10-CM

## 2019-06-04 LAB
BASOPHILS # BLD AUTO: 0.01 10*3/MM3 (ref 0–0.2)
BASOPHILS NFR BLD AUTO: 0.2 % (ref 0–1.5)
DEPRECATED RDW RBC AUTO: 44.2 FL (ref 37–54)
EOSINOPHIL # BLD AUTO: 0.06 10*3/MM3 (ref 0–0.4)
EOSINOPHIL NFR BLD AUTO: 1.2 % (ref 0.3–6.2)
ERYTHROCYTE [DISTWIDTH] IN BLOOD BY AUTOMATED COUNT: 12.8 % (ref 12.3–15.4)
HCT VFR BLD AUTO: 40.6 % (ref 34–46.6)
HGB BLD-MCNC: 14.1 G/DL (ref 12–15.9)
IMM GRANULOCYTES # BLD AUTO: 0.04 10*3/MM3 (ref 0–0.05)
IMM GRANULOCYTES NFR BLD AUTO: 0.8 % (ref 0–0.5)
LYMPHOCYTES # BLD AUTO: 0.79 10*3/MM3 (ref 0.7–3.1)
LYMPHOCYTES NFR BLD AUTO: 16.1 % (ref 19.6–45.3)
MCH RBC QN AUTO: 32.8 PG (ref 26.6–33)
MCHC RBC AUTO-ENTMCNC: 34.7 G/DL (ref 31.5–35.7)
MCV RBC AUTO: 94.4 FL (ref 79–97)
MONOCYTES # BLD AUTO: 0.54 10*3/MM3 (ref 0.1–0.9)
MONOCYTES NFR BLD AUTO: 11 % (ref 5–12)
NEUTROPHILS # BLD AUTO: 3.48 10*3/MM3 (ref 1.7–7)
NEUTROPHILS NFR BLD AUTO: 70.7 % (ref 42.7–76)
NRBC BLD AUTO-RTO: 0 /100 WBC (ref 0–0.2)
PLATELET # BLD AUTO: 162 10*3/MM3 (ref 140–450)
PMV BLD AUTO: 9.1 FL (ref 6–12)
RBC # BLD AUTO: 4.3 10*6/MM3 (ref 3.77–5.28)
WBC NRBC COR # BLD: 4.92 10*3/MM3 (ref 3.4–10.8)

## 2019-06-04 PROCEDURE — 36415 COLL VENOUS BLD VENIPUNCTURE: CPT | Performed by: INTERNAL MEDICINE

## 2019-06-04 PROCEDURE — 99214 OFFICE O/P EST MOD 30 MIN: CPT | Performed by: INTERNAL MEDICINE

## 2019-06-04 PROCEDURE — 85025 COMPLETE CBC W/AUTO DIFF WBC: CPT | Performed by: INTERNAL MEDICINE

## 2019-06-04 RX ORDER — MELOXICAM 15 MG/1
15 TABLET ORAL DAILY
COMMUNITY
End: 2020-02-13

## 2019-06-04 RX ORDER — BUPROPION HYDROCHLORIDE 150 MG/1
150 TABLET, EXTENDED RELEASE ORAL DAILY
COMMUNITY

## 2019-06-04 NOTE — PROGRESS NOTES
Subjective     REASON FOR FOLLOW UP:  Left breast cancer 15 mm in size, ductal invasive, grade II,small lobular component, and dcis, sp/ lumpectomy, negative sentinel lymph nodes, Er + . Pr positive her 2 negative by IHC, ONCOTYPE DX LOW RISK        History of Present Illness This patient returns today to the office for followup. She is here today very tearful and worried about completion of her radiation therapy for her left breast cancer a few days ago and developing significant irritation of the skin grade 2 toxicity. She is trying local therapy according to the directions by radiation oncology and she is slowly improving. She has been very fatigued and again worried about her future because she feels lonely now. The good news is that her  has been extremely participative in her care. Overall the patient has had good appetite, stable weight, normal bowel function, normal urination. She has not developed any skeletal pain with the use of her Femara and she is taking the medicine with 100% compliance. She has not encountered any hot flashes. She remains active. She has no other issues at this time besides being tearful and feeling depressed at some point.             Past Medical History:   Diagnosis Date   • Arthritis     BACK   • Breast lump     LEFT   • Cancer (CMS/HCC) 02/2019    BREAST LEFT   • Hip pain, left    • History of back pain    • History of colon polyp    • History of osteopenia    • IBS (irritable bowel syndrome)    • Mild scoliosis    • Numbness in feet     LEFT SECOND TOE, GETTING INJECTIONS FOR THIS   • Seasonal allergies         Past Surgical History:   Procedure Laterality Date   • ANTERIOR AND POSTERIOR VAGINAL REPAIR  04/2010   • BREAST AUGMENTATION Bilateral 2015   • BREAST BIOPSY Left 12/20/2018    Procedure: BREAST BIOPSY;  Surgeon: Mike Adams MD;  Location: [facility];  Service: General   • BREAST LUMPECTOMY WITH SENTINEL NODE BIOPSY Left 2/20/2019    Procedure:  BREAST LUMPECTOMY WITH SENTINEL NODE BIOPSY;  Surgeon: Mike Adams MD;  Location: [facility] OR;  Service: General   • BUNIONECTOMY Bilateral     RIGHT       LEFT 2-2015   • HERNIA REPAIR Left 03/1994    INGUINAL    • HERNIA REPAIR Left 04/1996    FEMORAL   • HYSTERECTOMY  02/2001   • LUMBAR FUSION  07/11/2018    L 4-5        Current Outpatient Medications on File Prior to Visit   Medication Sig Dispense Refill   • buPROPion SR (WELLBUTRIN SR) 150 MG 12 hr tablet Take 150 mg by mouth 2 (Two) Times a Day.     • coenzyme Q10 100 MG capsule Take 200 mg by mouth Daily.     • dicyclomine (BENTYL) 20 MG tablet Take 20 mg by mouth Every 6 (Six) Hours As Needed.     • Emollient (COLLAGEN EX) Take 1 package by mouth Daily.     • fluticasone (FLONASE) 50 MCG/ACT nasal spray 2 sprays by Each Nare route Daily.     • letrozole (FEMARA) 2.5 MG tablet Take 1 tablet by mouth Daily for 90 days. 30 tablet 6   • meloxicam (MOBIC) 15 MG tablet Take 15 mg by mouth Daily.     • Milk Thistle 1000 MG capsule Take 240 mg by mouth Daily.     • Multiple Vitamins-Minerals (MULTIVITAMIN WITH MINERALS) tablet tablet Take 1 tablet by mouth Daily.     • tiZANidine (ZANAFLEX) 4 MG tablet Take 2 mg by mouth 2 (Two) Times a Day As Needed for Muscle Spasms.     • Flaxseed, Linseed, (FLAXSEED OIL) 1200 MG capsule Take 1,200 mg by mouth Daily.     • Unable to find 1 each 1 (One) Time. Med Name: sclerosing injections  7 injections every other week  1st injection was 2/4/19       No current facility-administered medications on file prior to visit.         ALLERGIES:  No Known Allergies     Social History     Socioeconomic History   • Marital status:      Spouse name: Tommy   • Number of children: 4   • Years of education: Technical school   • Highest education level: Not on file   Occupational History     Employer: RETIRED   Social Needs   • Financial resource strain: Not hard at all   • Food insecurity:     Worry: Never true      Inability: Never true   • Transportation needs:     Medical: No     Non-medical: No   Tobacco Use   • Smoking status: Former Smoker     Packs/day: 0.50     Years: 10.00     Pack years: 5.00     Types: Cigarettes     Last attempt to quit:      Years since quittin.4   • Smokeless tobacco: Never Used   • Tobacco comment: QUIT 20-30 YEARS AGO   Substance and Sexual Activity   • Alcohol use: Yes     Alcohol/week: 4.2 - 8.4 oz     Types: 7 - 14 Glasses of wine per week   • Drug use: No   • Sexual activity: Defer        Family History   Problem Relation Age of Onset   • Breast cancer Mother 70        had it twice   • Esophageal cancer Father 80   • Leukemia Brother    • Colon cancer Brother    • Malig Hyperthermia Neg Hx       ONCOLOGIC HISTORY:I have been asked to see this patient in consultation today by Willian Adams MD after she has undergone a left breast lumpectomy in the background of abnormal mammogram that has been an ongoing issue since first 2018. On that occasion she was seen by her obstetrician/gynecologist and a mammogram and ultrasound were on a couple of occasions negative benign. The patient was reviewed again by Dr. Adams by the end of  and given the fact that the patient was having minimal nipple retraction, it was decided to go ahead and proceed with a biopsy that documented ductal carcinoma invasive with lobular component and DCIS. Since then the patient has undergone a lumpectomy and sentinel lymph node sampling. The patient is known for having breast implants that have been present for at least 10 or 12 years. These structures were not touched or modified during the recent surgical time. In any event, the patient has healed well from surgery. She has some leftover pain in the left axilla and minimal numbness but otherwise good rotation and range of motion for her left upper extremity. Her appetite has been normal. Her bowel function has been normal. Urination has been normal. She  has not had any cardiovascular or respiratory issues of any kind and no bone pain or joint pain. She has had chronic back pain now for almost 2 years related to degenerative disease and previous surgery. This pain is no different in character or timing. The patient denies any neurological symptomatology. Otherwise, she is in excellent health.       Review of Systems         General: no fever, no chills,  fatigue,no weight changes, no lack of appetite.  Eyes: no epiphora, xerophthalmia,conjunctivitis, pain, glaucoma, blurred vision, blindness, secretion, photophobia, proptosis, diplopia.  Ears: no otorrhea, tinnitus, otorrhagia, deafness, pain, vertigo.  Nose: no rhinorrhea, no epistaxis, no alteration in perception of odors, no sinuses pressure.  Mouth: no alteration in gums or teeth,  No ulcers, no difficulty with mastication or deglut ion, no odynophagia.  Neck: no masses or pain, no thyroid alterations, no pain in muscles or arteries, no carotid odynia, no crepitation.  Respiratory: no cough, no sputum production,no dyspnea,no trepopnea, no pleuritic pain,no hemoptysis.  Heart: no syncope, no irregularity, no palpitations, no angina,no orthopnea,no paroxysmal nocturnal dyspnea.  Vascular Venous: no tenderness,no edema,no palpable cords,no postphlebitic syndrome, no skin changes no ulcerations.  Vascular Arterial: no distal ischemia, noclaudication, no gangrene, no neuropathic ischemic pain, no skin ulcers, no paleness no cyanosis.  GI: no dysphagia, no odynophagia, no regurgitation, no heartburn,no indigestion,no nausea,no vomiting,no hematemesis ,no melena,no jaundice,no distention, no obstipation,no enterorrhagia,no proctalgia,no anal  lesions, no changes in bowel habits.  : no frequency, no hesitancy, no hematuria, no discharge,no  pain.  Musculoskeletal: no muscle or tendon pain or inflammation,no  joint pain, no edema, no functional limitation,no fasciculations, no mass.  Neurologic: no headache, no  "seizures, noalterations on Craneal nerves, no motor deficit, no sensory deficit, normal coordination, no alteration in memory,normal orientation, calculation,normal writting, verbal and written language.  Skin: no rashes,no pruritus no localized lesions.  Psychiatric:  anxiety,  depression,no agitation, no delusions, proper insight.      Objective     Vitals:    06/04/19 1221   BP: 120/70   Pulse: 72   Resp: 16   Temp: 98 °F (36.7 °C)   SpO2: 99%   Weight: 58.7 kg (129 lb 6.4 oz)   Height: 162.6 cm (64.02\")   PainSc: 0-No pain     Current Status 3/27/2019   ECOG score 0       Physical Exam          ECOG 1 NO PAIN  GENERAL:  Well-developed, well-nourished  Patient  in no acute distress.   SKIN:  Warm, dry ,NO rashes,NO purpura ,NO petechiae.  HEENT:  Pupils were equal and reactive to light and accomodation, conjunctivas non injected, no pterigion, normal extraocular movements, normal visual acuity.   Mouth mucosa was moist, no exudates in oropharynx, normal gum line, normal roof of the mouth and pillars, normal papillations of the tongue.  NECK:  Supple with good range of motion; no thyromegaly or masses, no JVD or bruits, no cervical adenopathies.No carotid arteries pain, no carotid abnormal pulsation , NO arterial dance.  LYMPHATICS:  No cervical, NO supraclavicular, NO axillary,NO epitrochlear , NO inguinal adenopathy.  CHEST:  Normal excursion of both yung thoraces, normal voice fremitus, no subcutaneous emphysema, normal axillas, no rashes or acanthosis nigricans. Lungs clear to percussion and auscultation, normal breath sounds bilaterally, no wheezing,NO crackles NO ronchi, NO stridor, NO rubs.  CARDIAC AND VASCULAR:  normal rate and regular rhythm, without murmurs,NO rubs NO S3 NO S4 right or left . Normal femoral, popliteal, pedis, brachial and carotid pulses.  INSPECTION of  breast documented symmetry of the tissue per se and location and size of the nipple,no retractions or inversion of the nipple, normal " skin without lesions, no erythema or nodules,no paud'orange, no prominence of superficial veins or chest wall collateral circulation.PALPATION of the breast documented normal skin turgor, no induration, alteration in local temperature, or pain, no palpable masses or nodules, normal mobility of the tissues,no fixation of the tissue or parenchyma to the chest wall, no alteration at the tail of the breasts or axillas, no adenopathies. LEFT IMPLANTSurgical site was well healed.No lymphedema in either extremity.ERYTHEMA GRADE II AT RADIATION SITE INCLUDING AXILLA  ABDOMEN:  Soft, nontender with no organomegaly or masses, no ascites, no collateral circulation,no distention,no Meyers Chuck sign, no abdominal pain, no inguinal hernias,no umbilical hernia, no abdominal bruits. Normal bowel sounds.  GENITAL: Not  Performed.  EXTREMITIES  AND SPINE:  No clubbing, cyanosis or edema, no deformities or pain .No kyphosis, scoliosis, deformities or pain in spine, ribs or pelvic bone.  NEUROLOGICAL:  Patient was awake, alert, oriented to time, person and place.          RECENT LABS:  Hematology WBC   Date Value Ref Range Status   06/04/2019 4.92 3.40 - 10.80 10*3/mm3 Final     RBC   Date Value Ref Range Status   06/04/2019 4.30 3.77 - 5.28 10*6/mm3 Final     Hemoglobin   Date Value Ref Range Status   06/04/2019 14.1 12.0 - 15.9 g/dL Final     Hematocrit   Date Value Ref Range Status   06/04/2019 40.6 34.0 - 46.6 % Final     Platelets   Date Value Ref Range Status   06/04/2019 162 140 - 450 10*3/mm3 Final          Assessment/Plan  In summary, I have a 58-year-old white female who has been extremely healthy all her life who noticed abnormalities with deviation of the nipple in the summer of 2018 on the left breast. Repeat ultrasound and mammograms documented so called “cysts” that looked benign. Given the persistency of the abnormality and her concern, she was again seen by Dr. Adams in 12/2018. She underwent a biopsy that turned out to  document a ductal carcinoma with lobular feature, grade 2. This triggered subsequently an MRI that disclosed on 2019, no residual malignancy and this triggered a lumpectomy that documented a residual malignancy that was 16 mm in size. This malignancy documented an invasive ductal with lobular features grade 2 and also she had a 5 mm foci of DCIS. Pep lymph nodes were removed, none of them having any malignant metastatic disease. Margins of resection were negative.     The patient also had analysis of the tumor for ER strongly positive 98%, NM positive, HER2/mak negative by immunohistochemistry. Ki-67 was 11%.     In summary, I do believe that the so-called cystic lesions close to the nipple in the last year were probably representation of the lobular component of her disease and later on this became more obvious and triggered further radiological analysis. The patient has stage I breast cancer on the left. She has completed a lumpectomy with complete resection, negative margins, negative sentinel lymph nodes. The tumor is highly ER positive, NM positive, HER2/mak 1+ by immunohistochemistry and negative sentinel lymph nodes. Ki-67. To me this is a very prognostic feature.     Also the patient has undergone genetic analysis and all of this was negative. This was done given the fact that the patient’s mother developed breast cancer at age 60 and she  as a consequence of breast cancer years later.       After reviewing her Oncotype DX today along with her  and providing her the report her risk of recurrence score is 19 therefore she is in the low risk category and the benefit of chemotherapy for her will be minimal if any at all. On the other hand she will benefit from hormonal therapy. For this reason I discussed with her the fact that I would like for her to initiate Femara at a dose of 2.5 mg a day for the next 5 years. The medicine will be not only protecting her from developing recurrent  breast cancer but will be effective as well decreasing the chance of contralateral new primary breast cancer. I explained to her side-effects of the medicine including joint pain that most of the time is transitory and improves with exercise and also the development of worsening osteoporosis. The patient has had a bone density test done more than 2 years ago and I am going to go ahead and schedule a bone density test before she returns back in 5 weeks.     I discussed with her again the lack of need for any chemotherapy treatment. This will not be beneficial whatsoever.     I also discussed with her the fact that her reconstruction is completed. She feels satisfied with the cosmetic result and she will keep going in regard further improvement.       Since completion of her radiation therapy the patient has developed a significant erythema in the skin and the left axilla to the point that she had to hold off on the radiation treatment for a couple of doses. She has also developed fatigue. She feels lonely and she feels like she is in limbo with some element of depression and she is tearful today. Her daughters have been extremely participative in her care as well as her  and I asked her to continue sharing her issues and circumstances with them. I pointed out to her that once the erythema improves her fatigue will improve and healing will take place in the proper way. I expect that this will happen as early as the next 3 weeks or so.     I encouraged her to remain on her Femara. This medicine so far has not produced any side-effects.     Eventually the patient will require a bone density test.    I went ahead and referred her to the survivorship clinic. Hopefully with reinforcement in regard to the future she will feel better and more supported.     I also discussed with her care of her cuticles and skin especially in the left upper extremity to minimize any potential for cellulitis or lymphedema.     I will  review her back in 2 months. I asked her to bring her  for the next visit in 2 months to have further discussion with him. Again he has been extremely supportive to her

## 2019-06-05 ENCOUNTER — TELEPHONE (OUTPATIENT)
Dept: OTHER | Facility: HOSPITAL | Age: 59
End: 2019-06-05

## 2019-06-05 NOTE — TELEPHONE ENCOUNTER
Bette called me right back and stated she is doing ok, but she still struggles with her emotions going up and down. She is interested in taking advantage of our supportive oncology clinic, but does not want to make an appointment at this time. She stated she would like to see the Survivorship nurse for an appointment first and go from there. She was also interested in getting a massage and an appointment was made for her and her . She was thankful for the call and will call with any other needs or concerns that arise.

## 2019-06-05 NOTE — TELEPHONE ENCOUNTER
Called Alesha to see how she was doing. Left a message with my contact information and asked her to call back at her convenience.

## 2019-06-12 ENCOUNTER — DOCUMENTATION (OUTPATIENT)
Dept: RADIATION ONCOLOGY | Facility: HOSPITAL | Age: 59
End: 2019-06-12

## 2019-06-12 NOTE — PROGRESS NOTES
Patient: Alesha Eldridge  YOB: 1960    RADIATION THERAPY TREATMENT SUMMARY  Diagnosis: Malignant neoplasm of central portion of left breast in female, estrogen receptor positive (CMS/HCC)      Patient Care Team:  Irineo Laguerre MD as PCP - General (Family Medicine)  Nereida Black MD as Consulting Physician (Obstetrics and Gynecology)  Mike Adams MD as Referring Physician (Breast Surgery)  Jairo Malik MD as Consulting Physician (Hematology and Oncology)  Jazmin Hernandez MD as Consulting Physician (Radiation Oncology)    Alesha Eldridge has recently completed the course of radiation therapy prescribed for the above-mentioned diagnosis.  Below, please find the specifics of the course of treatment delivered:      Radiation Details:    Dates of treatment:  4/15/2019 - 5/30/2019.  Treatment Site - LT BREAST  Treatment Intent - Curative, Postoperative  Total Dose in cGy - 4800  Number of Treatments - 24  Dose per fraction - 200 cGy per fraction  Fractions per day - 1 fx/day  Fractions per week - 5 fx/week  Treatment Type - Tangents, 3D  Energy - 6 MVP, 18 MVP  Normalization - Calc point, Prescribed at 100%  Imaging/Field Verification - Simulation before first treatment to verify field, blocking, placement and positioning, Simulation for all ports of change, Weekly port films of all ports, Other, see below  Additional comments: - IGRT DAILY OSMS DIBH, 5 TX C . 5CM BOLUS QOD AT END OF TX    Treatment Site - LT CW BOOST  Treatment Intent - Curative, Postoperative  Total Dose in cGy - 1000  Number of Treatments - 5  Dose per fraction - 200 cGy per fraction  Fractions per day - 1 fx/day  Fractions per week - 5 fx/week  Treatment Type - Electrons  Energy - 6 MeV  Normalization - Calc point, Prescribed at 95%, See below  Imaging/Field Verification - Simulation before first treatment to verify field, blocking, placement and positioning, Simulation for all ports of change  Additional  comments: - . 5CM BOLUS DAILY      Tolerance and Toxicities: she tolerated the treatments well , requiring 1 treatment break from 5/16-5/20 due to skin irritation.. she completed the treatments in 36 elapsed days.    Follow-up Plans:  I have asked the patient to return to see me in approximately 4 weeks. I have also made a referral to our Survivorship Clinic.

## 2019-06-17 ENCOUNTER — TELEPHONE (OUTPATIENT)
Dept: ONCOLOGY | Facility: HOSPITAL | Age: 59
End: 2019-06-17

## 2019-06-17 NOTE — TELEPHONE ENCOUNTER
"Patient calling , wants to know if she is \"cancer free\"  Reviewed Dr Malik' last note with her from 06/04/19.  Questions answered . Pt V/U  "

## 2019-06-17 NOTE — TELEPHONE ENCOUNTER
----- Message from Miriam Garcia sent at 6/17/2019  9:52 AM EDT -----  Contact: [phone]  Pt has questions for nurse.

## 2019-06-24 ENCOUNTER — OFFICE VISIT (OUTPATIENT)
Dept: RADIATION ONCOLOGY | Facility: HOSPITAL | Age: 59
End: 2019-06-24

## 2019-06-24 ENCOUNTER — OFFICE VISIT (OUTPATIENT)
Dept: OTHER | Facility: HOSPITAL | Age: 59
End: 2019-06-24

## 2019-06-24 VITALS
SYSTOLIC BLOOD PRESSURE: 130 MMHG | WEIGHT: 129.1 LBS | DIASTOLIC BLOOD PRESSURE: 80 MMHG | BODY MASS INDEX: 22.15 KG/M2 | HEART RATE: 91 BPM | RESPIRATION RATE: 18 BRPM | TEMPERATURE: 98.3 F | OXYGEN SATURATION: 99 %

## 2019-06-24 VITALS
RESPIRATION RATE: 15 BRPM | OXYGEN SATURATION: 100 % | WEIGHT: 126 LBS | SYSTOLIC BLOOD PRESSURE: 129 MMHG | TEMPERATURE: 97.9 F | HEIGHT: 64 IN | DIASTOLIC BLOOD PRESSURE: 82 MMHG | BODY MASS INDEX: 21.51 KG/M2 | HEART RATE: 76 BPM

## 2019-06-24 DIAGNOSIS — Z17.0 MALIGNANT NEOPLASM OF CENTRAL PORTION OF LEFT BREAST IN FEMALE, ESTROGEN RECEPTOR POSITIVE (HCC): Primary | ICD-10-CM

## 2019-06-24 DIAGNOSIS — C50.112 MALIGNANT NEOPLASM OF CENTRAL PORTION OF LEFT BREAST IN FEMALE, ESTROGEN RECEPTOR POSITIVE (HCC): Primary | ICD-10-CM

## 2019-06-24 PROCEDURE — G0463 HOSPITAL OUTPT CLINIC VISIT: HCPCS | Performed by: NURSE PRACTITIONER

## 2019-06-24 PROCEDURE — 99024 POSTOP FOLLOW-UP VISIT: CPT | Performed by: RADIOLOGY

## 2019-06-24 PROCEDURE — 99215 OFFICE O/P EST HI 40 MIN: CPT | Performed by: NURSE PRACTITIONER

## 2019-06-24 NOTE — PROGRESS NOTES
[facility] MULTIDISCIPLINARY CLINIC  SURVIVORSHIP VISIT    Alesha Eldridge is a pleasant 58 y.o.   female being followed by Jairo Malik MD  for invasive ductal carcinoma of the left breast. Here today in our Cancer Survivorship Clinic, to review survivorship care plan.    TREATMENT HISTORY:        Malignant neoplasm of central portion of left breast in female, estrogen receptor positive (CMS/HCC)    12/20/2018 Initial Diagnosis     Malignant neoplasm of central portion of left breast in female, estrogen receptor positive (CMS/HCC)         12/20/2018 Biopsy     1. Skin, Left Breast, Subareolar Tissue:                A.  Infiltrating breast carcinoma of no special type (ductal carcinoma) with lobular features (see comment).               B.  Carcinoma measures up to 8 mm in single greatest aggregate dimension (measured on glass slide).               C.  Overall Ambika grade = II (Tubular differentiation score = 2, nuclear score = 3, mitotic score = 1).               D.  No definitive lymphovascular space invasion identified.               E.  Focal perineural invasion by carcinoma is present.               F.  No Pagetoid involvement of skin by carcinoma.               G. Carcinoma extends to the margin of excision.    Estrogen receptor: % Positive  Progesterone receptor: % Positive         1/29/2019 Genetic Testing     Genetic testing Negative via Inviate Breast Cancer STAT Panel; Common Hereditary Cancers Panel with add-on QIANA and CHEK2 gene         2/20/2019 Surgery     Left breast lumpectomy with sentinel lymph node biopsy with Mike Adams MD    1. Breast, Left, Lumpectomy (24 grams):               A. Invasive mammary carcinoma of no special type (ductal) with focal lobular features, measuring 16 mm                   maximally, Ambika Histologic grade 2 (tubules = 2, nuclei = 3, mitoses = 1) identified adjacent to biopsy                   site changes.  All  margins of excision are free of invasive tumor. The invasive tumor has the following                   measurements to the margin: posterior 17 mm, superior 9 mm, inferior 3 mm, medial 20 mm, lateral 15 mm.               B. A minimal associated component of ductal carcinoma in situ (DCIS), 5mm, identified with solid and cribriform                   architecture, apocrine features and associated microcalcifications.  The margin is free of in situ tumor.  In                   situ tumor has the following measurements to the margin: posterior 5 mm, superior 10 mm, inferior 17 mm,                   medial 20 mm, lateral 25 mm.               C. Atypical lobular hyperplasia.                D. See synoptic template below.     2. Lymph Node, Left [place] #1, Excision:               A. Benign lymph node (0/1).     3. Lymph Node, Left [place] #2, Excision:               A. Benign reactive lymph node (0/1).    Oncotype DX: 19         4/15/2019 - 5/30/2019 Radiation     Left breast received 4800 cGy in 24 treatments  LT CW Boost received 1000 cGy in 5 treatments  Per Jazmin Hernandez MD         4/29/2019 Imaging     DEXA showed osteopenia of the lumbar spine and hips         6/4/2019 -  Hormonal Therapy     Femara 2.5 mg by mouth daily            Allergies as of 06/24/2019   • (No Known Allergies)       MEDICATIONS:  I have reviewed the medication list with the patient and I updated it in the electronic medical record. Medication dosages and frequencies were confirmed to be accurate with the patient.      Review of Systems   Constitutional: Positive for activity change and fatigue. Negative for appetite change and unexpected weight change (10 pound intentional weight loss).        Denies hot flashes   Respiratory: Negative for chest tightness and shortness of breath.    Cardiovascular: Negative for chest pain and leg swelling.   Gastrointestinal: Negative for blood in stool, constipation and diarrhea.   Genitourinary: Negative  for hematuria.   Musculoskeletal: Negative for arthralgias and myalgias.   Neurological: Negative for dizziness and light-headedness.   Psychiatric/Behavioral: Positive for decreased concentration and sleep disturbance. Negative for dysphoric mood. The patient is nervous/anxious.        DISTRESS QUESTIONNAIRE: 4/10 EFFECT TO LEVEL OF FUNCTIONING: mild  Patient identified areas of distress: see flowsheet      /80   Pulse 91   Temp 98.3 °F (36.8 °C) (Oral)   Resp 18   Wt 58.6 kg (129 lb 1.6 oz)   SpO2 99% Comment: room air  BMI 22.15 kg/m²     Wt Readings from Last 3 Encounters:   06/24/19 57.2 kg (126 lb)   06/24/19 58.6 kg (129 lb 1.6 oz)   06/04/19 58.7 kg (129 lb 6.4 oz)       Pain Score    06/24/19 1045   PainSc: 0-No pain         Physical Exam   Constitutional: She is oriented to person, place, and time. She appears well-developed and well-nourished. She is cooperative.   HENT:   Head: Normocephalic and atraumatic.   Mouth/Throat: Oropharynx is clear and moist and mucous membranes are normal.   Cardiovascular: Normal rate and regular rhythm.   Pulmonary/Chest: Effort normal. No respiratory distress.   Abdominal: Soft. Normal appearance.   Musculoskeletal: Normal range of motion.   Neurological: She is alert and oriented to person, place, and time.   Skin: Skin is warm, dry and intact.   Psychiatric: She has a normal mood and affect. Her speech is normal and behavior is normal. Judgment and thought content normal. Cognition and memory are normal.   Vitals reviewed.        Problem List Items Addressed This Visit        Active Problems    Malignant neoplasm of central portion of left breast in female, estrogen receptor positive (CMS/HCC) - Primary    Relevant Orders    Ambulatory Referral to Lymphedema Clinic            SURVIVORSHIP DISCUSSION HELD TODAY:   Primary patient goal(s): coping with uncertainty     Management of disease and treatment related effects: Bette reports good tolerance of Femara.   She denies any difficulties with hot flashes.  She has good range of motion of the left upper extremity.  We discussed lymphedema, causes signs and symptoms and prevention.  She has had sentinel node with subsequent radiation treatments I recommend a referral to lymphedema clinic for baseline evaluation and education and I provided her with the [facility] lymphedema informational pamphlet.  She has been working on weight management and following a ketogenic diet.  She reports 10 pounds of intentional weight loss.  Her appetite has been good with no difficulties related to constipation or diarrhea.  She does report an interrupted sleep pattern which typically means she is waking up at 2 or 3 in the morning and unable to fall back asleep.  She does still have some residual fatigue as well as some mental fatigue which presents as difficulties with concentration.  She has some concerns regarding loss of libido since treatment and we discussed this is not at all unusual and for most people this resolves over time.  I did provide her with information from [employer] regarding changes in sexuality during and after cancer treatment.  We also discussed nonhormonal management of vaginal dryness to include an over-the-counter vaginal moisturizer such as Replens or coconut oil 1-3 times a week and water-based lubricants without additives since her smells for sexual activity.    Psychosocial and spiritual: Bette rates her distress of 4 today.  She has spoken with Kitty YANG on the phone who was able to introduce support of oncology services to her.  She deferred further follow-up with Kitty until after completion of her survivorship appointment.  She tells me today she would like to get that follow-up set up with Kitty and I have sent Kitty message to that effect.  In the meantime of encouraged Bette to explore other avenues for peer support available in the community at [employer]'s club or with the [employer] [facility] [employer]  "cancer support network.  Also advised her of the upcoming [employer] [employer] breast cancer support group beginning in September and she is interested in getting registered for that.  I will send her a reminder when it gets closer to the start date.  She reports her spouse is extremely caring and supportive and she has 2 daughters that live out of town who have been calling her daily but now that treatment is finished she fears the calls and support will stop and that her loved ones will view her as \"normal\".  We discussed that this disconnect between being viewed as normal by others but struggling internally with the emotional effects of the cancer diagnosis after treatment is also a common phenomenon and have encouraged her to consider asking her family specifically for ongoing support.    Advance Care Planning   Advance Care Planning Discussion:    Patient does have advance care planning complete and scanned into the medical record. Written information provided regarding advance care planning and appropriateness for all healthy adults, choosing a healthcare surrogate and upcoming Advance Care Planning classes offered monthly at the [facility].         Maintaining personal wellness: We discussed current BMI of 22 and that it is within the recommended target of 20-25 for wellness, cardiovascular health and risk reduction for cancer recurrence and prevention. We discussed availability of oncology registered dietician, Mirna Andersen and referral offered. Patient declines at this time. Mirna's contact info and handout describing recommended dietary modifications emphasizing whole foods, plant based diet provided.  We did discuss today specifically links between alcohol intake and cancer risk.  She does report having 1-2 drinks per day and I have encouraged her to consider limiting herself to no more than 1 drink per day and has a manner of risk reduction.  She does report staying quite busy and active and " enjoys hiking with her .  We discussed the exercise programs available at the [facility] and [facility] as well as the dragon boat rowing team the [place] [employer].                Discussed NCCN recommendations for all cancer survivors of 150 minutes/week moderate intensity exercise, achieve and maintain a healthy BMI, plants-based whole-foods diet, avoid tobacco and second hand smoke, avoid alcohol or minimize alcohol intake - no more than 1 drink in a day for women, 2 drinks in a day for men.     Orders Placed This Encounter   Procedures   • Ambulatory Referral to Lymphedema Clinic     Referral Priority:   Routine     Referral Type:   Therapy     Referred to Provider:   Kitty Venegas PT     Number of Visits Requested:   1       After review of the Survivorship Treatment Summary & Care Plan, the patient verbalized understanding of recommendations for follow-up. As outlined in the care plan, they were advised to continue with follow-up care in accordance with the NCCN surveillance guidelines while transitioning back to their primary care physician for continued general preventive and healthcare needs. We discussed the importance of healthy eating, exercise and weight management. We reviewed current guidelines for routine screening of other cancers.     A copy of the Survivorship Treatment Summary & Care Plan for Ms. Eldridge (see below) was provided to and forwarded to the providers identified on the care team.      Greater than 40 minutes spent with patient, more than half of that spent face-to-face counseling patient extensively on treatment summary, surveillance for recurrence, late and long-term effects of disease and treatment, intimacy and self-image, preventative screening for other cancers, achieving/maintaining healthy BMI and link to risk reduction, adherence to current therapies, management of anxiety/uncertainty and self care strategies.         Breast Cancer Survivorship Plan       General Information   Patient name Alesha Eldridge   Date of birth 1960    Phone Home Phone [phone]        Email [email or web address]      Cancer Treatment Team     Patient Care Team:  Mike Adams MD as Referring Physician (Breast Surgery)  Jairo Malik MD as Consulting Physician (Hematology and Oncology)  Jazmin Hernandez MD as Consulting Physician (Radiation Oncology)    Provider Phone numbers  Care Team Provider: Nereida Black MD,  ([phone])  Care Team Provider: Mike Adams MD,  ([phone])  Care Team Provider: Jairo Malik MD,  ([phone])  Care Team Provider: Jazmin Hernandez MD,  ([phone])     Post Treatment Care Team   Primary Care Physician Irineo Laguerre  [address] [place] [address]   [phone]         Background Information   Medical history Past Medical History:   • Arthritis    BACK   • Breast lump    LEFT   • Cancer (CMS/HCC)    BREAST LEFT   • Hip pain, left   • History of back pain   • History of colon polyp   • History of osteopenia   • IBS (irritable bowel syndrome)   • Mild scoliosis   • Numbness in feet    LEFT SECOND TOE, GETTING INJECTIONS FOR THIS   • Seasonal allergies        Surgical history Past Surgical History:   • ANTERIOR AND POSTERIOR VAGINAL REPAIR   • BREAST AUGMENTATION   • BREAST BIOPSY    Procedure: BREAST BIOPSY;  Surgeon: Mike Adams MD;  Location: [facility];  Service: General   • BREAST LUMPECTOMY WITH SENTINEL NODE BIOPSY    Procedure: BREAST LUMPECTOMY WITH SENTINEL NODE BIOPSY;  Surgeon: Mike Adams MD;  Location: [facility];  Service: General   • BUNIONECTOMY    RIGHT       LEFT 2-2015   • HERNIA REPAIR    INGUINAL    • HERNIA REPAIR    FEMORAL   • HYSTERECTOMY   • LUMBAR FUSION    L 4-5        Tobacco use Social History     Tobacco Use   Smoking Status Former Smoker   • Packs/day: 0.50   • Years: 10.00   • Pack years: 5.00   • Types: Cigarettes    • Last attempt to quit:    • Years since quittin.4   Smokeless Tobacco Never Used   Tobacco Comment    QUIT 20-30 YEARS AGO        Family oncology history Cancer-related family history includes Breast cancer (age of onset: 70) in her mother; Colon cancer in her brother; Esophageal cancer (age of onset: 80) in her father.      Oncology Information      Malignant neoplasm of central portion of left breast in female, estrogen receptor positive (CMS/HCC)    2018 Initial Diagnosis     Malignant neoplasm of central portion of left breast in female, estrogen receptor positive (CMS/HCC)           2018 Biopsy     1. Skin, Left Breast, Subareolar Tissue:                A.  Infiltrating breast carcinoma of no special type (ductal carcinoma) with lobular features (see comment).               B.  Carcinoma measures up to 8 mm in single greatest aggregate dimension (measured on glass slide).               C.  Overall Unity grade = II (Tubular differentiation score = 2, nuclear score = 3, mitotic score = 1).               D.  No definitive lymphovascular space invasion identified.               E.  Focal perineural invasion by carcinoma is present.               F.  No Pagetoid involvement of skin by carcinoma.               G. Carcinoma extends to the margin of excision.    Estrogen receptor: % Positive  Progesterone receptor: % Positive           2019 Genetic Testing     Genetic testing Negative via Inviate Breast Cancer STAT Panel; Common Hereditary Cancers Panel with add-on QIANA and CHEK2 gene           2019 Surgery     Left breast lumpectomy with sentinel lymph node biopsy with Mike Adams MD    1. Breast, Left, Lumpectomy (24 grams):               A. Invasive mammary carcinoma of no special type (ductal) with focal lobular features, measuring 16 mm                   maximally, Unity Histologic grade 2 (tubules = 2, nuclei = 3, mitoses = 1) identified adjacent to  biopsy                   site changes.  All margins of excision are free of invasive tumor. The invasive tumor has the following                   measurements to the margin: posterior 17 mm, superior 9 mm, inferior 3 mm, medial 20 mm, lateral 15 mm.               B. A minimal associated component of ductal carcinoma in situ (DCIS), 5mm, identified with solid and cribriform                   architecture, apocrine features and associated microcalcifications.  The margin is free of in situ tumor.  In                   situ tumor has the following measurements to the margin: posterior 5 mm, superior 10 mm, inferior 17 mm,                   medial 20 mm, lateral 25 mm.               C. Atypical lobular hyperplasia.                D. See synoptic template below.     2. Lymph Node, Left [place] #1, Excision:               A. Benign lymph node (0/1).     3. Lymph Node, Left [place] #2, Excision:               A. Benign reactive lymph node (0/1).    Oncotype DX: 19           4/15/2019 - 5/30/2019 Radiation     Left breast received 4800 cGy in 24 treatments  LT CW Boost received 1000 cGy in 5 treatments  Per Jazmin Hernandez MD           4/29/2019 Imaging     DEXA showed osteopenia of the lumbar spine and hips           6/4/2019 -  Hormonal Therapy     Femara 2.5 mg by mouth daily                  Complications during Therapy:   No concerns stated   Modification to Treatment Plan:  No Modifications      Lifetime Dose Tracking:   No doses have been documented on this patient for the following tracked chemicals: Doxorubicin, Epirubicin, Idarubicin, Daunorubicin, Mitoxantrone, Bleomycin, Mitomycin, Doxorubicin Liposomal             [No treatment plan]         Persistent Treatment-Associated Adverse Effects at Completion of Therapy   It is important to recognize that not every person experiences the following adverse events after treatment.  You may not have any of these issues, a few or many adverse effects.  Experiences are  highly variable.  Please discuss any adverse effects of cancer treatment with your cancer care team.      After Surgical Therapy   Breast Conserving Surgery (Lumpectomy)     Breast conserving surgery (lumpectomy) involves the removal of the breast mass (cancer lump) and a surrounding area of normal tissue. After surgery, there may be pain and soreness in the chest, underarm or shoulder which should get better over time. Nerves may be damaged in the breast and areas of lymph node removal which may result in numbness and other changes in sensation. Ask your doctor or nurse if you have issues with pain, numbness or tingling, or any changes in function or mobility.      Breast conserving surgery allows women to keep their breast but the breast may look different than it did before surgery. The breast may be smaller and may be different in size and shape. There will be a scar from the surgery and scar tissue may feel different. Radiation therapy can also affect the way the breast looks and feels. How you think and feel about your body is important and coping with changes after breast surgery takes time.  It's important to look at your scar, which should become less red and swollen over time. It's important to touch your scar, too.  Your physician may give you instructions about massaging the scar to help with healing and to soften scar tissue. If you have a partner, let your partner look at and feel the scar when you're ready.  Working through feelings about the cancer and changes as a result of surgery may take time and support. Talk with your doctor or nurse about any issues with body image and coping.      Survivors of breast cancer should speak with their health care provider regarding the possibility of a genetic or family syndrome. If there does appear to be a family history or possible genetic syndrome, genetic counseling and testing may be recommended.      [place] Node Biopsy   Removal of the sentinel lymph  node is the removal of the first lymph node to which cancer cells are most likely to spread. After surgery, there may be pain and soreness in the area where the node was removed.  Nerves may be damaged which may result in numbness or other changes in sensation. While sentinel node biopsy (as opposed to a lymph node dissection where more lymph nodes are removed) decreases the risk of developing lymphedema, the risk is not completely gone.     Lymphedema   Removal of lymph nodes can slow the normal flow of lymph in the area which can lead to swelling in that limb, also called lymphedema.  Survivors who also received radiation therapy to the area where a lymph node was removed may be at increased risk of developing lymphedema. In some cases, the lymphedema can occur years after cancer therapy was completed. Lymphedema can cause pain or discomfort, disfigurement, change in function, and increased risk of infection in the affected area and closest limb. Signs of lymphedema can include a feeling of fullness or heaviness, changes in the skin (red, thick, stiff), aching, tightness, and difficulty moving or flexing nearby joints.  Other signs could be that your jewelry or clothes, like socks, pants or sleeves, may begin to feel tight on the affected limb. As signs of lymphedema could develop months or years after treatment, continue to monitor for signs and notify your doctor.      Several steps can be taken to help prevent and control lymphedema. Survivors should protect the potentially affected limb by avoiding cuts, scrapes, burns, insect bites, shots/vaccines, blood draws, and IV sticks in order to decrease the risk of developing an infection in the limb. In addition, the survivor should protect the limb from the sun to avoid sunburn.  Lastly, survivors should avoid tight clothing and jewelry, and blood pressures in the affected limb that might further slow the normal flow of lymph.      Survivors of cancer, including  those at risk for lymphedema, can and should exercise. Survivors should start slow and gradually increase intensity while monitoring your limb for changes in swelling or redness. If either occurs, stop exercising and notify your doctor for further direction.            After Chemotherapy   No chemotherapy received.      After Radiation Therapy   Radiation therapy can cause early and late side effects.  Early side effects are those that happen during or shortly after treatment and are usually gone within a few weeks after treatment ends.  Late side effects may take months or years to develop and vary depending on the areas of the body included in the field of radiation and the radiation techniques that were used.      The most common early side effects are fatigue (feeling tired) and skin changes.  Other early side effects are usually related to the area being treated. If you continue to have some skin problems after treatment ends, be gentle with the skin in the treatment area until all signs of irritation are gone and continue to care for your skin as your doctors and nurses have advised. If you continue to have fatigue, you may need to plan your activities to maximize energy, get extra rest while your body is still recovering and follow other instructions from your doctors and nurses such as exercise and activity.    Long term effects of radiation therapy vary greatly depending on the areas included in the field of radiation and the radiation techniques that were used. Breast tissue exposed to radiation may become more firm over time and you may notice changes in the size and shape of the breast. The skin where the breast was treated may have a different coloration, be more red or appear tanned. If the lymph nodes under the arm (axillary nodes) were in the radiation field, there may be an increased risk of developing lymphedema.  Lymphedema is a condition in which fluid collects in the arm or other areas such as  the hand, fingers, chest or back and cause swelling. In rare cases, radiation therapy can increase the risk of a second cancer. Ask your doctors and nurses about the risk of long term effects. Keep your follow up appointments and keep up with recommended health screenings        Hormone Therapy    Hormones, like estrogen and progesterone, are naturally produced in the body. Typically these hormones help our body function in various ways, however, in some cases these hormones can also cause cancer to grow.  When your cancer is positive for hormone receptors, your doctor may recommend hormone therapy. Hormone therapy works by either blocking the effects of the hormone on the cancer cell, or by reducing the amount of hormone produced by the body.  In doing so, the cancer cells no longer receive or use the hormone to grow.      It is very important for you to take the medicine as prescribed by your doctor and keep your regular follow-up appointments. Some medications interfere with hormone therapy medications, so be sure and discuss all medications that you take with your doctor.  Common side effects women experience from hormone therapy includes hot flashes, vaginal dryness, and lack of a period in women who have not yet reached menopause.  Some less common but serious side effects of hormone therapy may include increased risk for blood clots, joint pain, bone loss, weakness, mood changes, nausea, fatigue, and loss of interest in sexual activity, endometrial and uterine cancers, risk for stroke, heart attack, angina, and heart failure. Share with your doctor the side effects you experience so a plan can be made to minimize the effects. In addition, these medications will be taken long-term, in some cases 5-10 years. When taking oral hormone therapy, it may be helpful to create a calendar, use a pillbox, or set an alarm on your watch or phone to remind you daily to take the medication.      Care of your Venous Access  Device   No Venous Access Device currently in place      General After Cancer Treatment        It is not uncommon for cancer to impact other areas of your life such as relationships, work and mental health.  If you develop financial concerns, resources are sometimes available to assist in these areas.  Depression and anxiety can present either during or after cancer diagnosis and treatment.  It is important to discuss with your physician any of these concerns so these resources can be made available to you.      Common Concerns After Treatment    Managing Anxiety or Depression:  Referral to support group, e.g. American Cancer Society (www.cancer.org, 429.887.2041), Cancer Support Community (www.Super[employer].org, 528.809.8622)    Referral to a community provider for cognitive behavioral therapy or counseling.    Psychiatric care or counseling and/or initiation of pharmacotherapy are available at the [facility] . Please call [phone] or talk to a member of your treatment team about a referral.    For anonymous information, resource requests or support you can also call the 24-hour Crisis and Information Center at [phone]      Fear of Cancer Coming Back:  Patients need to know that these feelings are normal, and they won’t cause the cancer to come back.    • Referral for cognitive behavioral therapy or counseling    • Referral to support group, e.g. American Cancer Society (www.cancer.org, 726.791.1987), Cancer Support Community (www.wellnessandcancer.org, 980.958.2559)      Fatigue:  Fatigue is one of the most common problems in patients with cancer. It may be related to the disease itself or cancer treatment and may continue beyond completion of treatment among long-term cancer survivors. Among people with cancer, 80% to 100% report fatigue.     Fatigue may be an isolated problem or occur as one part of a cluster of symptoms, such as pain, depression,  dyspnea, anorexia, and sleep problems.    If you are taking an immunotherapy, fatigue may be a symptom of an endocrine disorder secondary to immunotherapy, such as thyroid or pituitary disorder, not just general cancer-related fatigue. It is important to report any changes to your doctor (ONS, 2017)    Regular physical activity (goal of 150 minutes of activity per week) is the most important thing you can do to manage fatigue. Other treatments that are likely to be helpful include:    · Progressive muscle relaxation and/or relaxation breathing with or without imagery or distraction  · Cognitive behavioral therapy for sleep  · Yoga  · Meditation, mindfulness based stress reduction, and cognitive behavioral stress management  · Cognitive behavioral therapy for fatigue, depression, and pain with or without hypnosis  · Evaluation for other causes of fatigue including hypothyroidism, anemia and depression      Financial Concerns:  The financial challenges that people with cancer and their families face are very real.     For hospital bills, you may want to talk with a hospital financial counselor. You may be able to work out a monthly payment plan or even get a reduced rate. You may also want to stay in touch with your insurance company to make sure costs are covered.    For information about resources that are available you can go to the NCI database, “Organizations That Offer Support Services,” at http://www.cancer.gov, search terms “financial assistance.”     Or call the NCI toll-free 0-274-3-CANCER (1-953.962.8311) to ask for help.      Managing Hot Flashes:  · Avoiding sugar may reduce night sweats  · New research studies suggest acupuncture may be helpful in control of hot flashes  · Regular meditation calms the adrenal glands and may reduce hot flashes and night sweats  · Ask your doctor about appropriate medical treatments. Medicines that may help manage hot flashes include Venlafaxine (Effexor) 37.5mg up to 75  mg daily OR Gabapentin (Neurontin) 300mg at bedtime up to 3 times/day.         Managing Insomnia  · Mindfulness based stress reduction  · Practice good sleep hygiene (reduce/eliminate TV and electronic device screen time one hour before bed)  · Avoid stimulants like caffeine and nicotine close to bedtime  · Avoid alcohol close to bedtime. While alcohol is well-known to help you fall asleep faster, too much close to bedtime can disrupt sleep in the second half of the night as the body begins to process the alcohol.     · Evaluation and management of other symptoms (hot flashes, anxiety, and pain)  · Regular physical activity (goal of 150 minutes of activity per week)  · The supplement melatonin may help with sleep disruption    More information at www.sleepfoundation.org      Concerns about Sexuality, Intimacy and Body Image  · Ask your treatment team about referrals to counseling and/or support groups to address body image concerns.  · Non-hormonal remedies for vaginal dryness for sexual activity include water-based vaginal lubricants (Astroglide, [place] Jelly). Avoid products with spermicides or additives as they can be irritating.  · Some people use a non-hormonal vaginal moisturizer 1-3 times per week to improve general comfort (Replens, coconut oil).  · Discuss pain with intercourse or sexual activity with your doctor.    Look Good Feel Better is a non-medical, brand-neutral public service program that teaches beauty techniques to people with cancer to help them manage the appearance-related side effects of cancer treatment. The program includes lessons on skin and nail care, cosmetics, wigs and turbans, accessories and styling, helping people with cancer to find some normalcy in a life that is by no means normal.    For more information and helpful videos visit:  http://lookgoodfeelbetter.org/       About Lymphedema:  According to the National Cancer [place], anywhere from 5-17% of women who have sentinel lymph  "node biopsy develops lymphedema. Among women who have axillary lymph node dissection, the percentage is higher - from 20-53% - and risk increases with the number of nodes taken out.     · Referral to lymphedema physical therapy specialist for lymphatic massage or proper fitting of compression garments or bandages  · Weight training and regular exercise  · Achieve and maintain a healthy weight    Tips to reduce the risk of injury or infection to the arm:  · Treat infections of the at-risk arm and hand right away.  · Wear gloves when doing house or garden work.  · Keep skin clean and well-moisturized.  · Use the arm not at risk when having blood drawn, getting injections or having blood pressure taken.  · Avoid sunburn and excess heat from saunas, hot baths, tanning and other sources.  · Don't cut nail cuticles.  · Use insect repellant when outdoors.  · Avoid injuries, including scratches and bruises, to the at-risk arm.  · Rest the at-risk arm in an elevated position (above the heart or shoulder).     If you have an infection, injury or any of the symptoms listed above, see your health care provider.        Changes in Memory or \"Brain Fog\"  Patients should know that 25% of cancer patients have cognitive dysfunction (changes in thinking or memory) after treatment and it usually gets better over time    Some things you can do to manage \"brain fog\" or memory problems include:  · Mental exercises (e.g., word games, crossword puzzles)   · Setting up and following routines  · Repeating information  · Keeping a memory journal  · Avoiding multitasking  · Exercising  · Maintaining a healthy diet.   · There is some evidence that Group Cognitive Training is also effective  · Rule out depression, sleep disturbance      Maintain a Healthy Weight:  Aim for a target BMI of 20-25 m2  (Body mass index is 22.2 kg/m².)    An oncology specialized registered dietician is available at the [facility] for consultations to " you. Please call [phone]      Prevention and Wellness:  · Achieve and maintain a healthy body weight as weight gain is a risk factor for development or recurrence of some cancers (BMI between 20-25m2).  · Current Body mass index is 22.2 kg/m².  · Regular physical activity (preferably daily). Strive for at least 150 minutes of moderate or 75 minutes of vigorous activity per week.  · Maintain a diet high in vegetables, fruits and whole grains and low in sugars and fats. Limit red meat and avoid processed foods.  · Avoid all tobacco and second hand smoke.  · Avoid all alcohol intake.  · If you do drink, minimize alcohol exposure - no more than one drink in a day for women and two drinks in a day for men.  · Continue all standard non-cancer related healthcare with your primary care provider. Any new, unusual, and/or persistent symptoms should be brought to the attention of your provider.               Surveillance    How Frequent?    Medical Oncology visits 1 - 4 times per year as clinically appropriate for 5 years, then annually      Lab tests As directed    Imaging exams      Mammography Annual clinical breast exam needed    Schedule a mammogram one year after the first mammogram that led to diagnosis, but no earlier than six months after radiation therapy. Obtain a mammogram every six to 12 months thereafter.    Women who have had a mastectomy (including simple mastectomy, modified radical mastectomy, and radical mastectomy) to treat breast cancer need no further routine screening mammograms on the affected side.     If both breasts are removed, they don’t need mammograms at all. There isn’t enough tissue remaining after these kinds of mastectomies to do a mammogram. Cancer can come back in the skin or chest wall on that side, but it can be found on a physical exam.      Lymphedema Monitor for lymphedema    Genetic Counseling Periodic screening for changes in family history and referral to genetic counseling as  indicated    Gynecologic assessment Pap smear: Beginning at age 30, every three years if the last three PAP tests in a row were normal. Every year if the last PAP was abnormal. every 3 years as long as the last 3 were normal, every year if abnormal.     Women age 70 and older who have had 3 or more normal PAP tests in a row, and no abnormal PAP tests results in the last 10 years, may choose to stop having PAP tests.    Pelvic exam:  Continue to visit a gynecologist annually.      Bone Density study For women on aromatase inhibitor or who experience ovarian failure secondary to treatment should have monitoring of bone health and bone mineral density determination at baseline and periodically thereafter    Bone densitometry (DEXA scan) every 1 to 2 years after initiation of aromatase inhibitor (if applicable) and/or at age 65 or older.    Calcium and Vitamin D.    Weight-bearing exercise most days of the week e.g. moderate intensity walking (can have a conversation but cannot sing).    Avoid all smoke and second hand smoke.     Bisphosphonate, if indicated by your doctor based on bone densitometry (DEXA scan) results.      Immunizations       Influenza       Herpes Zoster       Pneumococcal Yearly  Once  As appropriate    Tobacco Cessation The patient is not currently a tobacco user.  Counseling given: Not Answered  Comment: QUIT 20-30 YEARS AGO         Monitor for ongoing toxicities:   None Specified      Call your doctor if you have any of these signs or symptoms   New or worsening symptoms.  Pain that is new, unrelieved or bothersome.  Difficulty with your emotions, anxiety, and/or depression.  Physical problems that affect your abilities in your daily life or those that are bothersome (for example, continued fatigue, trouble sleeping, sexual problems, or edema).      Referrals provided   There are no referral needs at this time.

## 2019-06-24 NOTE — PROGRESS NOTES
Subjective     No ref. provider found     Diagnosis Plan   1. Malignant neoplasm of central portion of left breast in female, estrogen receptor positive (CMS/HCC)       Chief Complaint   Patient presents with   • Follow-up     S/P XRT to breast                                   S:    I had the pleasure of seeing Alesha Eldridge  [facility].  She returns today for follow up now 4 weeks out from completion of her radiation therapy. She completed radiation to left breast on 5/30/19.  She received a dose of 48Gy to the left breast followed by a tumor bed boost for an additional 10Gy.   She has been doing well since I last saw her.  She states her skin has healed nicely.        Review of Systems   Constitutional: Negative.    Skin: Negative.    Psychiatric/Behavioral: The patient is nervous/anxious.          Past Medical History:   Diagnosis Date   • Arthritis     BACK   • Breast lump     LEFT   • Cancer (CMS/HCC) 02/2019    BREAST LEFT   • Hip pain, left    • History of back pain    • History of colon polyp    • History of osteopenia    • IBS (irritable bowel syndrome)    • Mild scoliosis    • Numbness in feet     LEFT SECOND TOE, GETTING INJECTIONS FOR THIS   • Seasonal allergies          Past Surgical History:   Procedure Laterality Date   • ANTERIOR AND POSTERIOR VAGINAL REPAIR  04/2010   • BREAST AUGMENTATION Bilateral 2015   • BREAST BIOPSY Left 12/20/2018    Procedure: BREAST BIOPSY;  Surgeon: Mike Adams MD;  Location: [facility];  Service: General   • BREAST LUMPECTOMY WITH SENTINEL NODE BIOPSY Left 2/20/2019    Procedure: BREAST LUMPECTOMY WITH SENTINEL NODE BIOPSY;  Surgeon: Mike Adams MD;  Location: [facility];  Service: General   • BUNIONECTOMY Bilateral     RIGHT       LEFT 2-2015   • HERNIA REPAIR Left 03/1994    INGUINAL    • HERNIA REPAIR Left 04/1996    FEMORAL   • HYSTERECTOMY  02/2001   • LUMBAR FUSION  07/11/2018    L 4-5         Social History      Socioeconomic History   • Marital status:      Spouse name: Tommy   • Number of children: 4   • Years of education: Technical school   • Highest education level: Not on file   Occupational History     Employer: RETIRED   Social Needs   • Financial resource strain: Not hard at all   • Food insecurity:     Worry: Never true     Inability: Never true   • Transportation needs:     Medical: No     Non-medical: No   Tobacco Use   • Smoking status: Former Smoker     Packs/day: 0.50     Years: 10.00     Pack years: 5.00     Types: Cigarettes     Last attempt to quit:      Years since quittin.4   • Smokeless tobacco: Never Used   • Tobacco comment: QUIT 20-30 YEARS AGO   Substance and Sexual Activity   • Alcohol use: Yes     Alcohol/week: 4.2 - 8.4 oz     Types: 7 - 14 Glasses of wine per week     Frequency: 4 or more times a week     Drinks per session: 1 or 2     Binge frequency: Never   • Drug use: No   • Sexual activity: Yes     Partners: Male     Birth control/protection: Post-menopausal     Comment: spouse,          Family History   Problem Relation Age of Onset   • Breast cancer Mother 70        had it twice   • Esophageal cancer Father 80   • Leukemia Brother    • Colon cancer Brother    • Malig Hyperthermia Neg Hx           Objective    Physical Exam   Constitutional: She appears well-developed and well-nourished.   Pulmonary/Chest:   Nipple surgically absent, no suspicious nodules or lesions along scar, no palpable masses left breast, mild residual hyperpigmentation             Current Outpatient Medications on File Prior to Visit   Medication Sig Dispense Refill   • buPROPion SR (WELLBUTRIN SR) 150 MG 12 hr tablet Take 150 mg by mouth Daily.     • coenzyme Q10 100 MG capsule Take 200 mg by mouth Daily.     • dicyclomine (BENTYL) 20 MG tablet Take 20 mg by mouth Every 6 (Six) Hours As Needed.     • Emollient (COLLAGEN EX) Take 1 package by mouth Daily.     • fluticasone (FLONASE) 50 MCG/ACT  nasal spray 2 sprays by Each Nare route Daily.     • letrozole (FEMARA) 2.5 MG tablet Take 1 tablet by mouth Daily for 90 days. 30 tablet 6   • meloxicam (MOBIC) 15 MG tablet Take 15 mg by mouth Daily.     • Milk Thistle 1000 MG capsule Take 240 mg by mouth Daily.     • Multiple Vitamins-Minerals (MULTIVITAMIN WITH MINERALS) tablet tablet Take 1 tablet by mouth Daily.     • tiZANidine (ZANAFLEX) 4 MG tablet Take 2 mg by mouth 2 (Two) Times a Day As Needed for Muscle Spasms.     • Unable to find 1 each 1 (One) Time. Med Name: sclerosing injections  7 injections every other week  1st injection was 2/4/19     • [DISCONTINUED] Flaxseed, Linseed, (FLAXSEED OIL) 1200 MG capsule Take 1,200 mg by mouth Daily.       No current facility-administered medications on file prior to visit.        ALLERGIES:  No Known Allergies    There were no vitals taken for this visit.     Current Status 3/27/2019   ECOG score 0         Assessment/Plan   58 year old female with T1N0 left breast cancer now almost 4 weeks out from radiation with no evidence of disease.  She is on femara and will follow up with Dr. Malik in August.  She will be due for her next yearly mammogram in early December 2019.  I will schedule this for her and see her back 1 week later.  She knows to call for this appointment.     Thank you very much for allowing me to participate in the care of this very pleasant patient.    Sincerely,      Jazmin Hernandez MD

## 2019-08-13 ENCOUNTER — LAB (OUTPATIENT)
Dept: LAB | Facility: HOSPITAL | Age: 59
End: 2019-08-13

## 2019-08-13 ENCOUNTER — OFFICE VISIT (OUTPATIENT)
Dept: ONCOLOGY | Facility: CLINIC | Age: 59
End: 2019-08-13

## 2019-08-13 VITALS
HEIGHT: 64 IN | HEART RATE: 80 BPM | DIASTOLIC BLOOD PRESSURE: 78 MMHG | TEMPERATURE: 98.7 F | WEIGHT: 128.5 LBS | RESPIRATION RATE: 12 BRPM | SYSTOLIC BLOOD PRESSURE: 126 MMHG | OXYGEN SATURATION: 99 % | BODY MASS INDEX: 21.94 KG/M2

## 2019-08-13 DIAGNOSIS — C50.112 MALIGNANT NEOPLASM OF CENTRAL PORTION OF LEFT BREAST IN FEMALE, ESTROGEN RECEPTOR POSITIVE (HCC): Primary | ICD-10-CM

## 2019-08-13 DIAGNOSIS — Z17.0 MALIGNANT NEOPLASM OF CENTRAL PORTION OF LEFT BREAST IN FEMALE, ESTROGEN RECEPTOR POSITIVE (HCC): Primary | ICD-10-CM

## 2019-08-13 LAB
BASOPHILS # BLD AUTO: 0.01 10*3/MM3 (ref 0–0.2)
BASOPHILS NFR BLD AUTO: 0.2 % (ref 0–1.5)
DEPRECATED RDW RBC AUTO: 42.5 FL (ref 37–54)
EOSINOPHIL # BLD AUTO: 0.04 10*3/MM3 (ref 0–0.4)
EOSINOPHIL NFR BLD AUTO: 0.8 % (ref 0.3–6.2)
ERYTHROCYTE [DISTWIDTH] IN BLOOD BY AUTOMATED COUNT: 11.8 % (ref 12.3–15.4)
HCT VFR BLD AUTO: 40.6 % (ref 34–46.6)
HGB BLD-MCNC: 14.1 G/DL (ref 12–15.9)
IMM GRANULOCYTES # BLD AUTO: 0.07 10*3/MM3 (ref 0–0.05)
IMM GRANULOCYTES NFR BLD AUTO: 1.3 % (ref 0–0.5)
LYMPHOCYTES # BLD AUTO: 1.02 10*3/MM3 (ref 0.7–3.1)
LYMPHOCYTES NFR BLD AUTO: 19.5 % (ref 19.6–45.3)
MCH RBC QN AUTO: 34 PG (ref 26.6–33)
MCHC RBC AUTO-ENTMCNC: 34.7 G/DL (ref 31.5–35.7)
MCV RBC AUTO: 97.8 FL (ref 79–97)
MONOCYTES # BLD AUTO: 0.56 10*3/MM3 (ref 0.1–0.9)
MONOCYTES NFR BLD AUTO: 10.7 % (ref 5–12)
NEUTROPHILS # BLD AUTO: 3.52 10*3/MM3 (ref 1.7–7)
NEUTROPHILS NFR BLD AUTO: 67.5 % (ref 42.7–76)
NRBC BLD AUTO-RTO: 0 /100 WBC (ref 0–0.2)
PLATELET # BLD AUTO: 201 10*3/MM3 (ref 140–450)
PMV BLD AUTO: 8.4 FL (ref 6–12)
RBC # BLD AUTO: 4.15 10*6/MM3 (ref 3.77–5.28)
WBC NRBC COR # BLD: 5.22 10*3/MM3 (ref 3.4–10.8)

## 2019-08-13 PROCEDURE — 85025 COMPLETE CBC W/AUTO DIFF WBC: CPT

## 2019-08-13 PROCEDURE — 99214 OFFICE O/P EST MOD 30 MIN: CPT | Performed by: INTERNAL MEDICINE

## 2019-08-13 PROCEDURE — 36415 COLL VENOUS BLD VENIPUNCTURE: CPT

## 2019-08-13 NOTE — PROGRESS NOTES
Subjective     REASON FOR FOLLOW UP:  Left breast cancer 15 mm in size, ductal invasive, grade II,small lobular component, and dcis, sp/ lumpectomy, negative sentinel lymph nodes, Er + . Pr positive her 2 negative by IHC, ONCOTYPE DX LOW RISK, ON ADJUVANT FEMARA FOR 5 YEARS STARTED AFTER SURGERY        History of Present Illness This patient returns today to the office for followup. She is here today in company of her  and she has been seen by the [facility] as well as by the , REUBEN Obrien. She is over the hump in regard to previous assessment during the previous visit when she was in the clouds with worries and not knowing what to do in regard to the answer of her body and mind to her diagnosis of breast cancer. The good thing is that her  and family are extremely supportive. She feels like a new person. She spent a month in [place] visiting her family and she feels fine. She has remained on her Femara on daily basis with no difficulties and no joint pain. Her appetite is good. Her weight is stable. Her bowel function is normal. No issues pertinent to her breast surgery. No cough. No sputum production. No shortness of breath. No anxiety. No stressors. No depression.              Past Medical History:   Diagnosis Date   • Arthritis     BACK   • Breast lump     LEFT   • Cancer (CMS/HCC) 02/2019    BREAST LEFT   • Hip pain, left    • History of back pain    • History of colon polyp    • History of osteopenia    • IBS (irritable bowel syndrome)    • Mild scoliosis    • Numbness in feet     LEFT SECOND TOE, GETTING INJECTIONS FOR THIS   • Seasonal allergies         Past Surgical History:   Procedure Laterality Date   • ANTERIOR AND POSTERIOR VAGINAL REPAIR  04/2010   • BREAST AUGMENTATION Bilateral 2015   • BREAST BIOPSY Left 12/20/2018    Procedure: BREAST BIOPSY;  Surgeon: Mike Adams MD;  Location: [facility] OR;  Service: General   • BREAST LUMPECTOMY WITH  SENTINEL NODE BIOPSY Left 2/20/2019    Procedure: BREAST LUMPECTOMY WITH SENTINEL NODE BIOPSY;  Surgeon: Mike Adams MD;  Location: [facility] OR;  Service: General   • BUNIONECTOMY Bilateral     RIGHT       LEFT 2-2015   • HERNIA REPAIR Left 03/1994    INGUINAL    • HERNIA REPAIR Left 04/1996    FEMORAL   • HYSTERECTOMY  02/2001   • LUMBAR FUSION  07/11/2018    L 4-5        Current Outpatient Medications on File Prior to Visit   Medication Sig Dispense Refill   • buPROPion SR (WELLBUTRIN SR) 150 MG 12 hr tablet Take 150 mg by mouth Daily.     • coenzyme Q10 100 MG capsule Take 200 mg by mouth Daily.     • dicyclomine (BENTYL) 20 MG tablet Take 20 mg by mouth Every 6 (Six) Hours As Needed.     • Emollient (COLLAGEN EX) Take 1 package by mouth Daily.     • fluticasone (FLONASE) 50 MCG/ACT nasal spray 2 sprays by Each Nare route Daily.     • KRILL OIL PO Take  by mouth.     • Milk Thistle 1000 MG capsule Take 240 mg by mouth Daily.     • Multiple Vitamins-Minerals (MULTIVITAMIN WITH MINERALS) tablet tablet Take 1 tablet by mouth Daily.     • meloxicam (MOBIC) 15 MG tablet Take 15 mg by mouth Daily.     • tiZANidine (ZANAFLEX) 4 MG tablet Take 2 mg by mouth 2 (Two) Times a Day As Needed for Muscle Spasms.     • Unable to find 1 each 1 (One) Time. Med Name: sclerosing injections  7 injections every other week  1st injection was 2/4/19       No current facility-administered medications on file prior to visit.         ALLERGIES:  No Known Allergies     Social History     Socioeconomic History   • Marital status:      Spouse name: Tommy   • Number of children: 4   • Years of education: Technical school   • Highest education level: Not on file   Occupational History     Employer: RETIRED   Social Needs   • Financial resource strain: Not hard at all   • Food insecurity:     Worry: Never true     Inability: Never true   • Transportation needs:     Medical: No     Non-medical: No   Tobacco Use   •  Smoking status: Former Smoker     Packs/day: 0.50     Years: 10.00     Pack years: 5.00     Types: Cigarettes     Last attempt to quit:      Years since quittin.6   • Smokeless tobacco: Never Used   • Tobacco comment: QUIT 20-30 YEARS AGO   Substance and Sexual Activity   • Alcohol use: Yes     Alcohol/week: 4.2 - 8.4 oz     Types: 7 - 14 Glasses of wine per week     Frequency: 4 or more times a week     Drinks per session: 1 or 2     Binge frequency: Never   • Drug use: No   • Sexual activity: Yes     Partners: Male     Birth control/protection: Post-menopausal, Surgical     Comment: spouse, Tommy        Family History   Problem Relation Age of Onset   • Breast cancer Mother 70        had it twice   • Esophageal cancer Father 80   • Leukemia Brother    • Colon cancer Brother    • Malig Hyperthermia Neg Hx       ONCOLOGIC HISTORY:I have been asked to see this patient in consultation today by Willian Adams MD after she has undergone a left breast lumpectomy in the background of abnormal mammogram that has been an ongoing issue since first 2018. On that occasion she was seen by her obstetrician/gynecologist and a mammogram and ultrasound were on a couple of occasions negative benign. The patient was reviewed again by Dr. Adams by the end of  and given the fact that the patient was having minimal nipple retraction, it was decided to go ahead and proceed with a biopsy that documented ductal carcinoma invasive with lobular component and DCIS. Since then the patient has undergone a lumpectomy and sentinel lymph node sampling. The patient is known for having breast implants that have been present for at least 10 or 12 years. These structures were not touched or modified during the recent surgical time. In any event, the patient has healed well from surgery. She has some leftover pain in the left axilla and minimal numbness but otherwise good rotation and range of motion for her left upper extremity.  Her appetite has been normal. Her bowel function has been normal. Urination has been normal. She has not had any cardiovascular or respiratory issues of any kind and no bone pain or joint pain. She has had chronic back pain now for almost 2 years related to degenerative disease and previous surgery. This pain is no different in character or timing. The patient denies any neurological symptomatology. Otherwise, she is in excellent health.       Review of Systems       General: no fever, no chills, no fatigue,no weight changes, no lack of appetite.  Eyes: no epiphora, xerophthalmia,conjunctivitis, pain, glaucoma, blurred vision, blindness, secretion, photophobia, proptosis, diplopia.  Ears: no otorrhea, tinnitus, otorrhagia, deafness, pain, vertigo.  Nose: no rhinorrhea, no epistaxis, no alteration in perception of odors, no sinuses pressure.  Mouth: no alteration in gums or teeth,  No ulcers, no difficulty with mastication or deglut ion, no odynophagia.  Neck: no masses or pain, no thyroid alterations, no pain in muscles or arteries, no carotid odynia, no crepitation.  Respiratory: no cough, no sputum production,no dyspnea,no trepopnea, no pleuritic pain,no hemoptysis.  Heart: no syncope, no irregularity, no palpitations, no angina,no orthopnea,no paroxysmal nocturnal dyspnea.  Vascular Venous: no tenderness,no edema,no palpable cords,no postphlebitic syndrome, no skin changes no ulcerations.  Vascular Arterial: no distal ischemia, noclaudication, no gangrene, no neuropathic ischemic pain, no skin ulcers, no paleness no cyanosis.  GI: no dysphagia, no odynophagia, no regurgitation, no heartburn,no indigestion,no nausea,no vomiting,no hematemesis ,no melena,no jaundice,no distention, no obstipation,no enterorrhagia,no proctalgia,no anal  lesions, no changes in bowel habits.  : no frequency, no hesitancy, no hematuria, no discharge,no  pain.  Musculoskeletal: no muscle or tendon pain or inflammation,no  joint pain,  "no edema, no functional limitation,no fasciculations, no mass.  Neurologic: no headache, no seizures, noalterations on Craneal nerves, no motor deficit, no sensory deficit, normal coordination, no alteration in memory,normal orientation, calculation,normal writting, verbal and written language.  Skin: no rashes,no pruritus no localized lesions.  Psychiatric: no anxiety, no depression,no agitation, no delusions, proper insight.        Objective     Vitals:    08/13/19 1533   BP: 126/78   Pulse: 80   Resp: 12   Temp: 98.7 °F (37.1 °C)   TempSrc: Oral   SpO2: 99%   Weight: 58.3 kg (128 lb 8 oz)   Height: 162.6 cm (64.02\")   PainSc: 5  Comment: back pain     Current Status 8/13/2019   ECOG score 0       Physical Exam        GENERAL:  Well-developed, well-nourished  Patient  in no acute distress.   SKIN:  Warm, dry ,NO rashes,NO purpura ,NO petechiae.  HEENT:  Pupils were equal and reactive to light and accomodation, conjunctivas non injected, no pterigion, normal extraocular movements, normal visual acuity.   Mouth mucosa was moist, no exudates in oropharynx, normal gum line, normal roof of the mouth and pillars, normal papillations of the tongue.  NECK:  Supple with good range of motion; no thyromegaly or masses, no JVD or bruits, no cervical adenopathies.No carotid arteries pain, no carotid abnormal pulsation , NO arterial dance.  LYMPHATICS:  No cervical, NO supraclavicular, NO axillary,NO epitrochlear , NO inguinal adenopathy.  CHEST:  Normal excursion of both yung thoraces, normal voice fremitus, no subcutaneous emphysema, normal axillas, no rashes or acanthosis nigricans. Lungs clear to percussion and auscultation, normal breath sounds bilaterally, no wheezing,NO crackles NO ronchi, NO stridor, NO rubs.  CARDIAC AND VASCULAR:  normal rate and regular rhythm, without murmurs,NO rubs NO S3 NO S4 right or left . Normal femoral, popliteal, pedis, brachial and carotid pulses.  ABDOMEN:  Soft, nontender with no " organomegaly or masses, no ascites, no collateral circulation,no distention,no Gleason sign, no abdominal pain, no inguinal hernias,no umbilical hernia, no abdominal bruits. Normal bowel sounds.  GENITAL: Not  Performed.  EXTREMITIES  AND SPINE:  No clubbing, cyanosis or edema, no deformities or pain .No kyphosis, scoliosis, deformities or pain in spine, ribs or pelvic bone.  NEUROLOGICAL:  Patient was awake, alert, oriented to time, person and place.                      RECENT LABS:  Hematology WBC   Date Value Ref Range Status   08/13/2019 5.22 3.40 - 10.80 10*3/mm3 Final     RBC   Date Value Ref Range Status   08/13/2019 4.15 3.77 - 5.28 10*6/mm3 Final     Hemoglobin   Date Value Ref Range Status   08/13/2019 14.1 12.0 - 15.9 g/dL Final     Hematocrit   Date Value Ref Range Status   08/13/2019 40.6 34.0 - 46.6 % Final     Platelets   Date Value Ref Range Status   08/13/2019 201 140 - 450 10*3/mm3 Final      BONE MINERAL DENSITOMETRY     HISTORY:  58-years-old female. Postmenopausal. Breast cancer. Prior L4-5  fusion.     COMPARISON:  None.     TECHNIQUE: The T score compares the patient's bone mineral density with  the peak bone mass of young normal patients. Patients with T-scores  between 1.0 and 2.5 standard deviations below the mean are osteopenic.  Patients with T-scores greater than 2.5 standard deviation below the  mean are osteoporotic.     The Z score compares the patient's bone mineral density with sex and age  matched patients. Z score of -2.0 and lower is an indication of low  mineral density for the patient's age.     FINDINGS: Lumbar T score is  -1.6.     Left hip density is lowest at the  femoral neck where the T score is  -1.4.      Right hip density is lowest at the  femoral neck where the T score is  -1.5.      IMPRESSION:  Osteopenia at the lumbar spine and both hips.     This report was finalized on 4/29/2019 3:59 PM by Dr. Aryan Iglesias M.D.         Assessment/Plan  In summary, I have a  58-year-old white female who has been extremely healthy all her life who noticed abnormalities with deviation of the nipple in the summer of  on the left breast. Repeat ultrasound and mammograms documented so called “cysts” that looked benign. Given the persistency of the abnormality and her concern, she was again seen by Dr. Adams in 2018. She underwent a biopsy that turned out to document a ductal carcinoma with lobular feature, grade 2. This triggered subsequently an MRI that disclosed on 2019, no residual malignancy and this triggered a lumpectomy that documented a residual malignancy that was 16 mm in size. This malignancy documented an invasive ductal with lobular features grade 2 and also she had a 5 mm foci of DCIS. [place] lymph nodes were removed, none of them having any malignant metastatic disease. Margins of resection were negative.     The patient also had analysis of the tumor for ER strongly positive 98%, ND positive, HER2/mak negative by immunohistochemistry. Ki-67 was 11%.     In summary, I do believe that the so-called cystic lesions close to the nipple in the last year were probably representation of the lobular component of her disease and later on this became more obvious and triggered further radiological analysis. The patient has stage I breast cancer on the left. She has completed a lumpectomy with complete resection, negative margins, negative sentinel lymph nodes. The tumor is highly ER positive, ND positive, HER2/mak 1+ by immunohistochemistry and negative sentinel lymph nodes. Ki-67. To me this is a very prognostic feature.     Also the patient has undergone genetic analysis and all of this was negative. This was done given the fact that the patient’s mother developed breast cancer at age 60 and she  as a consequence of breast cancer years later.       After reviewing her Oncotype DX today along with her  and providing her the report her risk of recurrence  score is 19 therefore she is in the low risk category and the benefit of chemotherapy for her will be minimal if any at all. On the other hand she will benefit from hormonal therapy. For this reason I discussed with her the fact that I would like for her to initiate Femara at a dose of 2.5 mg a day for the next 5 years. The medicine will be not only protecting her from developing recurrent breast cancer but will be effective as well decreasing the chance of contralateral new primary breast cancer. I explained to her side-effects of the medicine including joint pain that most of the time is transitory and improves with exercise and also the development of worsening osteoporosis. The patient has had a bone density test done more than 2 years ago and I am going to go ahead and schedule a bone density test before she returns back in 5 weeks.     I discussed with her again the lack of need for any chemotherapy treatment. This will not be beneficial whatsoever.     I also discussed with her the fact that her reconstruction is completed. She feels satisfied with the cosmetic result and she will keep going in regard further improvement.       Since completion of her radiation therapy the patient has developed a significant erythema in the skin and the left axilla to the point that she had to hold off on the radiation treatment for a couple of doses. She has also developed fatigue. She feels lonely and she feels like she is in limbo with some element of depression and she is tearful today. Her daughters have been extremely participative in her care as well as her  and I asked her to continue sharing her issues and circumstances with them. I pointed out to her that once the erythema improves her fatigue will improve and healing will take place in the proper way. I expect that this will happen as early as the next 3 weeks or so.       Further reviewing the patient on 08/13/2019, she is over the hump in regard to all the  crisis that she had emotionally during the previous visit after completion of her radiation therapy. Her breast area has returned to normality. Asymmetry of the tissue is excellent and she feels very satisfied with the outcome of her surgery.     The patient has not encountered any side effects of the Femara; specifically, no joint pain, no hot flashes and no other issues. She is taking the medicine 100% compliant.      We discussed her bone health today including the bone density done months ago. She has osteopenia and this is evident in the lumbar and hip areas. I encouraged her to continue walking at least 10,000 steps a day, to have sun exposure 10 minutes once a week at 10 a.m. or extra 30,000 units of vitamin D and take vitamin D supplementation 1000 units a day. I explained to her that she would require a new DEXA scan in a couple of years in 04/2021.     I discussed with her the fact that she over the hump emotionally and I think her family and  have been extremely supportive as well as REUBEN Obrien, in regard to discussion about the present and the future.     She will have her routine mammogram in 01/2019.     I will review her back in 6 months. I find no need for anymore laboratory testing on her at this point. Her white count, hemoglobin and platelets are completely normal today. I discussed all these facts with the patient and her .

## 2019-08-19 ENCOUNTER — HOSPITAL ENCOUNTER (OUTPATIENT)
Dept: PHYSICAL THERAPY | Facility: HOSPITAL | Age: 59
Setting detail: THERAPIES SERIES
Discharge: HOME | End: 2019-08-19

## 2019-08-19 DIAGNOSIS — Z91.89 AT RISK FOR LYMPHEDEMA: ICD-10-CM

## 2019-08-19 DIAGNOSIS — N63.20 LEFT BREAST MASS: ICD-10-CM

## 2019-08-19 DIAGNOSIS — Z98.890 STATUS POST LEFT BREAST LUMPECTOMY: ICD-10-CM

## 2019-08-19 DIAGNOSIS — M25.511 ACUTE PAIN OF RIGHT SHOULDER: ICD-10-CM

## 2019-08-19 DIAGNOSIS — Z17.0 MALIGNANT NEOPLASM OF CENTRAL PORTION OF LEFT BREAST IN FEMALE, ESTROGEN RECEPTOR POSITIVE (HCC): Primary | ICD-10-CM

## 2019-08-19 DIAGNOSIS — C50.112 MALIGNANT NEOPLASM OF CENTRAL PORTION OF LEFT BREAST IN FEMALE, ESTROGEN RECEPTOR POSITIVE (HCC): Primary | ICD-10-CM

## 2019-08-19 PROCEDURE — 97161 PT EVAL LOW COMPLEX 20 MIN: CPT | Performed by: PHYSICAL THERAPIST

## 2019-08-19 PROCEDURE — 97110 THERAPEUTIC EXERCISES: CPT | Performed by: PHYSICAL THERAPIST

## 2019-08-19 PROCEDURE — 97535 SELF CARE MNGMENT TRAINING: CPT | Performed by: PHYSICAL THERAPIST

## 2019-08-20 NOTE — THERAPY EVALUATION
Physical Therapy Lymphedema Initial Evaluation  [facility]     Patient Name: Alesha Eldridge  : 1960  MRN: [id number]  Today's Date: 2019      Visit Date: 2019    Visit Dx:    ICD-10-CM ICD-9-CM   1. Malignant neoplasm of central portion of left breast in female, estrogen receptor positive (CMS/HCC) C50.112 174.1    Z17.0 V86.0   2. Status post left breast lumpectomy Z98.890 V45.89   3. At risk for lymphedema Z91.89 V49.89   4. Acute pain of right shoulder M25.511 719.41   5. Left breast mass N63.20 611.72       Patient Active Problem List   Diagnosis   • Left breast mass   • Malignant neoplasm of central portion of left breast in female, estrogen receptor positive (CMS/HCC)        Past Medical History:   Diagnosis Date   • Arthritis     BACK   • Breast lump     LEFT   • Cancer (CMS/HCC) 2019    BREAST LEFT   • Hip pain, left    • History of back pain    • History of colon polyp    • History of osteopenia    • IBS (irritable bowel syndrome)    • Mild scoliosis    • Numbness in feet     LEFT SECOND TOE, GETTING INJECTIONS FOR THIS   • Seasonal allergies         Past Surgical History:   Procedure Laterality Date   • ANTERIOR AND POSTERIOR VAGINAL REPAIR  2010   • BREAST AUGMENTATION Bilateral    • BREAST BIOPSY Left 2018    Procedure: BREAST BIOPSY;  Surgeon: Mike Adams MD;  Location: [facility];  Service: General   • BREAST LUMPECTOMY WITH SENTINEL NODE BIOPSY Left 2019    Procedure: BREAST LUMPECTOMY WITH SENTINEL NODE BIOPSY;  Surgeon: Mike Adams MD;  Location: [facility];  Service: General   • BUNIONECTOMY Bilateral     RIGHT       LEFT    • HERNIA REPAIR Left 1994    INGUINAL    • HERNIA REPAIR Left 1996    FEMORAL   • HYSTERECTOMY  2001   • LUMBAR FUSION  2018    L 4-5       Visit Dx:    ICD-10-CM ICD-9-CM   1. Malignant neoplasm of central portion of left breast in female, estrogen receptor positive  (CMS/[facility]) C50.112 174.1    Z17.0 V86.0   2. Status post left breast lumpectomy Z98.890 V45.89   3. At risk for lymphedema Z91.89 V49.89   4. Acute pain of right shoulder M25.511 719.41   5. Left breast mass N63.20 611.72       Patient History     Row Name 08/19/19 1030             History    Chief Complaint  Other 1 (comment) risk of lymphedema  -SC      Date Current Problem(s) Began  02/20/19  -SC      Brief Description of Current Complaint  patient diagnosed left breast cancer, s/p left lumpectomy 02/20/2019, did not require chemotherapy, completed radiation April to May 2019. Does have osteopenia. Currently on AI[facility] back surgery last July 2018, still recovering, then woke up about one week ago with right shoulder pain insidious onset, mostly at night sleeping. States sick of going to [facility] at this time. Did formal therapy for back 3x/week for quite some time. She wants to get back to life and hiking and enjoying time with her spouse. She did move here from [place]. Enjoys working in her garden.   -SC      Patient/Caregiver Goals  Return to prior level of function;Know what to do to help the symptoms  -SC      Current Tobacco Use  none   -SC      Smoking Status  former  -SC      Patient's Rating of General Health  Good  -SC      Hand Dominance  right-handed  -SC      History of Previous Related Injuries  no  -SC      Are you or can you be pregnant  No  -SC         Pain     Pain Location  Shoulder right  -SC      Pain at Present  0  -SC      Pain at Best  0  -SC      Pain at Worst  6  -SC      Pain Frequency  Intermittent  -SC      Pain Description  Aching catching  -SC      What Performance Factors Make the Current Problem(s) WORSE?  sleeping on right side, reaching, lifting  -SC      What Performance Factors Make the Current Problem(s) BETTER?  rest  -SC      Pain Comments  right shoulder, acute, one week, insidious, negative left  -SC      Is your sleep disturbed?  Yes  -SC      Is medication used to assist  with sleep?  Unspecified  -SC      What position do you sleep in?  Right sidelying;Left sidelying  -SC         Fall Risk Assessment    Any falls in the past year:  No  -SC      Previous Functional Level  -- independent  -SC         Services    Are you currently receiving Home Health services  No  -SC         Daily Activities    Primary Language  English  -SC      Are you able to read  Yes  -SC      Are you able to write  Yes  -SC      How does patient learn best?  Reading  -SC      Teaching needs identified  Home Exercise Program;Management of Condition  -SC      Patient is concerned about/has problems with  Grasping objects lifting;Performing home management (household chores, shopping, care of dependents);Performing job responsibilities/community activities (work, school,;Performing sports, recreation, and play activities;Repetitive movements of the hand, arm, shoulder  -SC      Does patient have problems with the following?  Anxiety  -SC      Barriers to learning  None  -SC      Explanation of Functional Status Problem  independent  -SC      Pt Participated in POC and Goals  Yes  -SC         Safety    Are you being hurt, hit, or frightened by anyone at home or in your life?  No  -SC      Are you being neglected by a caregiver  No  -SC        User Key  (r) = Recorded By, (t) = Taken By, (c) = Cosigned By    Initials Name Provider Type    SC Kitty Venegas PT Physical Therapist          Lymphedema     Row Name 08/19/19 1030             Lymphedema Assessment    Lymphedema Classification  LUE:;at risk/stage 0;secondary at risk; left breast  -SC      Lymphedema Cancer Related Sx  left;lumpectomy;sentinel node biopsy  -SC      Lymphedema Surgery Comments  02/20/2019  -SC      Lymph Nodes Removed #  2  -SC      Positive Lymph Nodes #  0  -SC      Chemo Received  no  -SC      Radiation Therapy Received  yes  -SC      Radiation Treatments #/Timeframe  04/15-05/30/2019  -SC      Adverse Radiation  Reactions/Complication  mild skin changes, fatigue  -SC      Infections or Cellulitis?  no  -SC      Lymphedema Assessment Comments  low risk  -SC         Lymphedema Edema Assessment    Ptting Edema Category  By grade out of 4  -SC      Pitting Edema  + 1/4 (+1 of 4) negative  -SC      Edema Assessment Comment  negative  -SC         Skin Changes/Observations    Skin Observations Comment  intact  -SC         Lymphedema Sensation    Lymphedema Sensation Comments  intact  -SC         Lymphedema Pulses/Capillary Refill    Lymph Pulses Capillary Refill Comments  intact  -SC         Manual Lymphatic Drainage    Manual Therapy  education skin care post radiation  -SC         Compression/Skin Care    Compression/Skin Care Comments  education bra fitting if indicated  -SC         L-Dex Bioimpedence Screening    L-Dex Measurement Extremity  LUE  -SC      L-Dex Patient Position  Standing  -SC      L-Dex UE Dominate Side  Right  -SC      L-Dex UE At Risk Side  Left  -SC      L-Dex UE Pre Surgical Value  No  -SC      L-Dex UE Score  -3.8  -SC      L-Dex UE Baseline Score  -3.8  -SC      L-Dex UE Value Change  0  -SC      L-Dex UE Comment  WNL  -SC        User Key  (r) = Recorded By, (t) = Taken By, (c) = Cosigned By    Initials Name Provider Type    Kitty Pringle, PT Physical Therapist            PT Ortho     Row Name 08/19/19 1030       Subjective Comments    Subjective Comments  initial evaluation  -SC       Precautions and Contraindications    Precautions/Limitations  no known precautions/limitations  -SC       Subjective Pain    Able to rate subjective pain?  yes  -SC    Pre-Treatment Pain Level  0  -SC       Posture/Observations    Posture- WNL  Posture is WNL  -SC       General ROM    GENERAL ROM COMMENTS  B UEs WNLs  -SC       MMT (Manual Muscle Testing)    General MMT Comments  B UEs WNLs  -SC       Gait/Stairs Assessment/Training    Comment (Gait/Stairs)  normal  -SC      User Key  (r) = Recorded By, (t) =  Taken By, (c) = Cosigned By    Initials Name Provider Type    Kitty Pringle PT Physical Therapist                    Therapy Education  Education Details: lymphedema, stages, early intervention/treatment, bioimpedance test and results, early s/s of lymphedema, 18 steps for prevention  Given: Symptoms/condition management, Other (comment)  Program: New  How Provided: Verbal, Demonstration, Written  Provided to: Patient  Level of Understanding: Teach back education performed, Verbalized, Demonstrated      Exercises     Row Name 08/19/19 1030             Subjective Comments    Subjective Comments  initial evaluation  -SC         Subjective Pain    Able to rate subjective pain?  yes  -SC      Pre-Treatment Pain Level  0  -SC        User Key  (r) = Recorded By, (t) = Taken By, (c) = Cosigned By    Initials Name Provider Type    Kitty Pringle PT Physical Therapist                          PT Assessment/Plan     Row Name 08/19/19 1030          PT Assessment    Impairments  Impaired lymphatic circulation  -SC     Assessment Comments  Mrs. Eldridge presents to therapy for lymphedema assessment/education s/p left lumpectomy in Feb 2019 with 0/3 L ALN (+), did not require chemotherapy but did complete radiation in May 2019. She currently has negative symptoms. Her skin is well healed. Negative s/s of lymphedema in left breast. Bra fits properly. Bioimpedance assessment completed, with current L-Dex score of -3.8. She was given very in depth education of lymphedema, stages, early intervention/prevention. At this time she does not require formal therapy. She is not interested in continuing servelliance bioimpedance testing. To contact me in the future with any questions, concerns or changes in symptoms.   -SC     Please refer to paper survey for additional self-reported information  Yes  -SC     Rehab Potential  Good  -SC     Patient/caregiver participated in establishment of treatment plan and goals  Yes  -SC      Patient would benefit from skilled therapy intervention  Yes  -SC        PT Plan    PT Frequency  One time visit  -SC     Planned CPT's?  PT EVAL LOW COMPLEXITY: 05008;PT THER ACT EA 15 MIN: 83068;PT RE-EVAL: 51222;PT THER PROC EA 15 MIN: 85843;PT MANUAL THERAPY EA 15 MIN: 44250;PT NEUROMUSC RE-EDUCATION EA 15 MIN: 53520;PT GAIT TRAINING EA 15 MIN: 48906;PT EVAL AQUA: 23560;PT AQUATIC THERAPY EA 15 MIN: 08192;PT SELF CARE/HOME MGMT/TRAIN EA 15: 66092;PT HOT OR COLD PACK TREAT MCARE;PT BIS XTRACELL FLUID ANALYSIS: 72021  -SC     PT Plan Comments  d/c to self-care, to contact me in the future with any questions, concerns or changes in symptoms  -SC       User Key  (r) = Recorded By, (t) = Taken By, (c) = Cosigned By    Initials Name Provider Type    SC Kitty Venegas, PT Physical Therapist                       Time Calculation:   Start Time: 1030  Stop Time: 1111  Time Calculation (min): 41 min   Therapy Charges for Today     Code Description Service Date Service Provider Modifiers Qty    65861765102 HC PT THER PROC EA 15 MIN 8/19/2019 Kitty Venegas, PT GP 1    40466610034 HC PT EVAL LOW COMPLEXITY 1 8/19/2019 Kitty Venegas, PT GP 1    52302081116 HC PT SELF CARE/MGMT/TRAIN EA 15 MIN 8/19/2019 Kitty Venegas, PT GP 1                    Kitty Easley PT  8/20/2019

## 2019-08-21 ENCOUNTER — DOCUMENTATION (OUTPATIENT)
Dept: OTHER | Facility: HOSPITAL | Age: 59
End: 2019-08-21

## 2019-08-21 ENCOUNTER — APPOINTMENT (OUTPATIENT)
Dept: OTHER | Facility: HOSPITAL | Age: 59
End: 2019-08-21

## 2019-08-21 NOTE — PROGRESS NOTES
Oncology Social Work  Supportive Oncology Services - [place]    Patient referred to OSW for an initial visit for psychosocial support and community resources from [facility].   Chart reviewed.  Patient is s/p left breast cancer, lumpectomy and sentinel node bx.  Patient underwent 6 weeks of radiation post op.  No Chemotherapy.     Physical/ Financial/ Social:  Ms. Eldridge is a 59 yr old  female, , with 8 children between the two of them.  This is patient's 3rd marriage.  Patient is originally from [place] and her goal is to get back to [place] as soon as they can.  She and her  moved here for his job.  Patient is retired.  She is active and independent with ADL's.  Patient with great support from her family and friends, but this support mainly comes from a distance due to the fact that most all her friends and family live in [place].  Patient reports no financial distress. She has insurance and rx drug coverage. Patient still drives and her  also assists with Transportation needs. Patient uses No DME and has never needed HH or SNF.     Mental/ Emotional/ Spiritual:   Patient presented to clinic alert and oriented x 3.  She was engaged, cooperative and talkative.  Bright affect, but with sad mood at times as she reflected on the last 7 months.  Patient has no psychiatric history, is not currently seeing a therapist.  Patient's GYN, Dr. Nereida Black started her on Wellbutrin due to vaginal dryness and low libido. Patient states that these symptoms have improved since starting the medication.  Patient states that her sleep is fragmented, she has low energy and fatigues easily.  Reports appetite to be normal and focus and concentration is improving the further out she gets from her XRT appt's.  Patient did become emotional while talking about her feelings, stress and some survivorship guilt.   Provided space for active and reflective listening.     Discussed  integrative therapies and activities patient is currently engaged in to help reduce stress, increase mary and move into Thriving after her cancer treatment and diagnosis.  Patient enjoys gardening. Patient to have massage therapy today.  Provided information on [employer] group designed for breast cancer patients. [employer] pamphlet also given and encouraged her to check into. Discussed [employer] and [employer] exercise program at [facility].  Offered to continue with supportive counseling with me, but patient not interested in this at this time.  She will call in the future if she changes her mind.    Thank you for the referral  Kitty Ashford, DAISYW, CSW, OSW-C

## 2019-10-09 ENCOUNTER — TELEPHONE (OUTPATIENT)
Dept: ONCOLOGY | Facility: HOSPITAL | Age: 59
End: 2019-10-09

## 2019-10-09 NOTE — TELEPHONE ENCOUNTER
----- Message from Leonora Fitzpatrick sent at 10/9/2019  3:52 PM EDT -----  Contact: [phone]  Wants to talk to a nurse      Pt has info on free mattresses for patients. I took down info and gave it to shelby BASHIR

## 2019-10-23 RX ORDER — LETROZOLE 2.5 MG/1
TABLET, FILM COATED ORAL
Qty: 30 TABLET | Refills: 5 | Status: SHIPPED | OUTPATIENT
Start: 2019-10-23 | End: 2020-04-14 | Stop reason: SINTOL

## 2019-12-04 ENCOUNTER — APPOINTMENT (OUTPATIENT)
Dept: WOMENS IMAGING | Facility: HOSPITAL | Age: 59
End: 2019-12-04

## 2019-12-04 PROCEDURE — 77067 SCR MAMMO BI INCL CAD: CPT | Performed by: RADIOLOGY

## 2019-12-04 PROCEDURE — 77063 BREAST TOMOSYNTHESIS BI: CPT | Performed by: RADIOLOGY

## 2019-12-09 ENCOUNTER — OFFICE VISIT (OUTPATIENT)
Dept: RADIATION ONCOLOGY | Facility: HOSPITAL | Age: 59
End: 2019-12-09

## 2019-12-09 VITALS
OXYGEN SATURATION: 99 % | WEIGHT: 126 LBS | SYSTOLIC BLOOD PRESSURE: 129 MMHG | DIASTOLIC BLOOD PRESSURE: 80 MMHG | BODY MASS INDEX: 21.62 KG/M2 | HEART RATE: 80 BPM

## 2019-12-09 DIAGNOSIS — C50.112 MALIGNANT NEOPLASM OF CENTRAL PORTION OF LEFT BREAST IN FEMALE, ESTROGEN RECEPTOR POSITIVE (HCC): Primary | ICD-10-CM

## 2019-12-09 DIAGNOSIS — Z17.0 MALIGNANT NEOPLASM OF CENTRAL PORTION OF LEFT BREAST IN FEMALE, ESTROGEN RECEPTOR POSITIVE (HCC): Primary | ICD-10-CM

## 2019-12-09 PROCEDURE — 99214 OFFICE O/P EST MOD 30 MIN: CPT | Performed by: RADIOLOGY

## 2019-12-09 NOTE — PROGRESS NOTES
Subjective     No ref. provider found     Diagnosis Plan   1. Malignant neoplasm of central portion of left breast in female, estrogen receptor positive (CMS/HCC)       Chief Complaint   Patient presents with   • Follow-up     S/P XRT to Breast     CC:        S:     I had the pleasure of seeing Alesha Eldridge  [facility].  She returns today for follow up now 6 months out from completion of her radiation therapy. She completed radiation to left breast on 5/30/19.  She received a dose of 48Gy to the left breast followed by a tumor bed boost for an additional 10Gy.   She has been doing well since I last saw her.  She states her skin has healed nicely. she is on femara and tolerating well,                                     Review of Systems   Constitutional: Negative.    Skin: Negative.          Past Medical History:   Diagnosis Date   • Arthritis     BACK   • Breast lump     LEFT   • Cancer (CMS/HCC) 02/2019    BREAST LEFT   • Hip pain, left    • History of back pain    • History of colon polyp    • History of osteopenia    • IBS (irritable bowel syndrome)    • Mild scoliosis    • Numbness in feet     LEFT SECOND TOE, GETTING INJECTIONS FOR THIS   • Seasonal allergies          Past Surgical History:   Procedure Laterality Date   • ANTERIOR AND POSTERIOR VAGINAL REPAIR  04/2010   • BREAST AUGMENTATION Bilateral 2015   • BREAST BIOPSY Left 12/20/2018    Procedure: BREAST BIOPSY;  Surgeon: Mike Adams MD;  Location: [facility];  Service: General   • BREAST LUMPECTOMY WITH SENTINEL NODE BIOPSY Left 2/20/2019    Procedure: BREAST LUMPECTOMY WITH SENTINEL NODE BIOPSY;  Surgeon: Mike Adams MD;  Location: [facility];  Service: General   • BUNIONECTOMY Bilateral     RIGHT       LEFT 2-2015   • HERNIA REPAIR Left 03/1994    INGUINAL    • HERNIA REPAIR Left 04/1996    FEMORAL   • HYSTERECTOMY  02/2001   • LUMBAR FUSION  07/11/2018    L 4-5         Social History      Socioeconomic History   • Marital status:      Spouse name: Tommy   • Number of children: 4   • Years of education: Technical school   • Highest education level: Not on file   Occupational History     Employer: RETIRED   Social Needs   • Financial resource strain: Not hard at all   • Food insecurity:     Worry: Never true     Inability: Never true   • Transportation needs:     Medical: No     Non-medical: No   Tobacco Use   • Smoking status: Former Smoker     Packs/day: 0.50     Years: 10.00     Pack years: 5.00     Types: Cigarettes     Last attempt to quit:      Years since quittin.9   • Smokeless tobacco: Never Used   • Tobacco comment: QUIT 20-30 YEARS AGO   Substance and Sexual Activity   • Alcohol use: Yes     Alcohol/week: 7.0 - 14.0 standard drinks     Types: 7 - 14 Glasses of wine per week     Frequency: 4 or more times a week     Drinks per session: 1 or 2     Binge frequency: Never   • Drug use: No   • Sexual activity: Yes     Partners: Male     Birth control/protection: Post-menopausal, Surgical     Comment: spouse, Tommy         Family History   Problem Relation Age of Onset   • Breast cancer Mother 70        had it twice   • Esophageal cancer Father 80   • Leukemia Brother    • Colon cancer Brother    • Malig Hyperthermia Neg Hx           Objective    Physical Exam   Constitutional: She appears well-developed and well-nourished.   Pulmonary/Chest:   Breasts are equal in size and symmetrical, nipple on left is surgically absent, mild fibrosis over scar, small nodularity lateral scar likely fibrosis but tender             Current Outpatient Medications on File Prior to Visit   Medication Sig Dispense Refill   • buPROPion SR (WELLBUTRIN SR) 150 MG 12 hr tablet Take 150 mg by mouth Daily.     • coenzyme Q10 100 MG capsule Take 200 mg by mouth Daily.     • dicyclomine (BENTYL) 20 MG tablet Take 20 mg by mouth Every 6 (Six) Hours As Needed.     • Emollient (COLLAGEN EX) Take 1 package  by mouth Daily.     • fluticasone (FLONASE) 50 MCG/ACT nasal spray 2 sprays by Each Nare route Daily.     • KRILL OIL PO Take  by mouth.     • letrozole (FEMARA) 2.5 MG tablet TAKE ONE TABLET BY MOUTH DAILY 30 tablet 5   • meloxicam (MOBIC) 15 MG tablet Take 15 mg by mouth Daily.     • Milk Thistle 1000 MG capsule Take 240 mg by mouth Daily.     • Multiple Vitamins-Minerals (MULTIVITAMIN WITH MINERALS) tablet tablet Take 1 tablet by mouth Daily.     • tiZANidine (ZANAFLEX) 4 MG tablet Take 2 mg by mouth 2 (Two) Times a Day As Needed for Muscle Spasms.     • Unable to find 1 each 1 (One) Time. Med Name: sclerosing injections  7 injections every other week  1st injection was 2/4/19       No current facility-administered medications on file prior to visit.        ALLERGIES:  No Known Allergies    There were no vitals taken for this visit.     Current Status 8/13/2019   ECOG score 0         Assessment/Plan   59 year old female with T1N0 left breast cancer now 6 months out from radiation with no evidence of disease.  She is on femara and will follow up with Dr. Malik.  She will be due for her next yearly mammogram in early December 2020. I am, going to order ultrasound of left breast in scar area due to nodularity. I will see her back in June 2020 or sooner if necessary.  She knows to call for this appointment                 Thank you very much for allowing me to participate in the care of this very pleasant patient.    Sincerely,      Jazmin Hernandez MD

## 2019-12-12 ENCOUNTER — HOSPITAL ENCOUNTER (OUTPATIENT)
Dept: ULTRASOUND IMAGING | Facility: HOSPITAL | Age: 59
Discharge: HOME | End: 2019-12-12
Admitting: RADIOLOGY

## 2019-12-12 DIAGNOSIS — Z17.0 MALIGNANT NEOPLASM OF CENTRAL PORTION OF LEFT BREAST IN FEMALE, ESTROGEN RECEPTOR POSITIVE (HCC): ICD-10-CM

## 2019-12-12 DIAGNOSIS — C50.112 MALIGNANT NEOPLASM OF CENTRAL PORTION OF LEFT BREAST IN FEMALE, ESTROGEN RECEPTOR POSITIVE (HCC): ICD-10-CM

## 2019-12-12 PROCEDURE — 76642 ULTRASOUND BREAST LIMITED: CPT

## 2019-12-16 DIAGNOSIS — C50.112 MALIGNANT NEOPLASM OF CENTRAL PORTION OF LEFT BREAST IN FEMALE, ESTROGEN RECEPTOR POSITIVE (HCC): Primary | ICD-10-CM

## 2019-12-16 DIAGNOSIS — Z17.0 MALIGNANT NEOPLASM OF CENTRAL PORTION OF LEFT BREAST IN FEMALE, ESTROGEN RECEPTOR POSITIVE (HCC): Primary | ICD-10-CM

## 2020-01-22 NOTE — TELEPHONE ENCOUNTER
I told her the path report showed a 16 mm cancer with clear margins and a little DCIS.  The lymph nodes were negative.   Refilled denied. She needs to be seen. Pharmacy is calling pt. Lulu Cooper RN on 1/22/2020 at 3:22 PM

## 2020-02-13 ENCOUNTER — APPOINTMENT (OUTPATIENT)
Dept: LAB | Facility: HOSPITAL | Age: 60
End: 2020-02-13

## 2020-02-13 ENCOUNTER — OFFICE VISIT (OUTPATIENT)
Dept: ONCOLOGY | Facility: CLINIC | Age: 60
End: 2020-02-13

## 2020-02-13 VITALS
WEIGHT: 129.3 LBS | OXYGEN SATURATION: 98 % | SYSTOLIC BLOOD PRESSURE: 161 MMHG | RESPIRATION RATE: 12 BRPM | HEART RATE: 74 BPM | TEMPERATURE: 98.9 F | BODY MASS INDEX: 22.91 KG/M2 | HEIGHT: 63 IN | DIASTOLIC BLOOD PRESSURE: 88 MMHG

## 2020-02-13 DIAGNOSIS — C50.112 MALIGNANT NEOPLASM OF CENTRAL PORTION OF LEFT BREAST IN FEMALE, ESTROGEN RECEPTOR POSITIVE (HCC): Primary | ICD-10-CM

## 2020-02-13 DIAGNOSIS — Z17.0 MALIGNANT NEOPLASM OF CENTRAL PORTION OF LEFT BREAST IN FEMALE, ESTROGEN RECEPTOR POSITIVE (HCC): Primary | ICD-10-CM

## 2020-02-13 PROCEDURE — 99213 OFFICE O/P EST LOW 20 MIN: CPT | Performed by: INTERNAL MEDICINE

## 2020-02-13 NOTE — PROGRESS NOTES
Subjective     REASON FOR FOLLOW UP:  Left breast cancer 15 mm in size, ductal invasive, grade II,small lobular component, and dcis, sp/ lumpectomy, negative sentinel lymph nodes, Er + . Pr positive her 2 negative by IHC, ONCOTYPE DX LOW RISK, ON ADJUVANT FEMARA FOR 5 YEARS STARTED AFTER SURGERY ON 2 /19        History of Present Illness This patient returns today to the office for followup. She is here today in company of her  stating that she has time to time discomfort in the right arm with no association with physical activity. There is no pain in the joint per se, just in the muscle area in the middle aspect of the arm. There is no relationship with physical activity, housework, going to the gym or anything of this nature. Sometimes feels like a neuropathic discomfort. She has not had any rash. No local alterations in the skin or soft tissues. Other than that, she has a terrific appetite, stable weight, normal bowel function, normal urination. No skeletal pain. She continues her Femara 2.5 mg a day with no side effects of the medicine. She wants me to review her left breast area where she feels a small nodule.                Past Medical History:   Diagnosis Date   • Arthritis     BACK   • Breast lump     LEFT   • Cancer (CMS/HCC) 02/2019    BREAST LEFT   • Hip pain, left    • History of back pain    • History of colon polyp    • History of osteopenia    • IBS (irritable bowel syndrome)    • Mild scoliosis    • Numbness in feet     LEFT SECOND TOE, GETTING INJECTIONS FOR THIS   • Seasonal allergies         Past Surgical History:   Procedure Laterality Date   • ANTERIOR AND POSTERIOR VAGINAL REPAIR  04/2010   • BREAST AUGMENTATION Bilateral 2015   • BREAST BIOPSY Left 12/20/2018    Procedure: BREAST BIOPSY;  Surgeon: Mike Adams MD;  Location: [facility];  Service: General   • BREAST LUMPECTOMY WITH SENTINEL NODE BIOPSY Left 2/20/2019    Procedure: BREAST LUMPECTOMY WITH SENTINEL NODE  BIOPSY;  Surgeon: Mike Adams MD;  Location: [facility] OR;  Service: General   • BUNIONECTOMY Bilateral     RIGHT       LEFT 2-2015   • HERNIA REPAIR Left 03/1994    INGUINAL    • HERNIA REPAIR Left 04/1996    FEMORAL   • HYSTERECTOMY  02/2001   • LUMBAR FUSION  07/11/2018    L 4-5        Current Outpatient Medications on File Prior to Visit   Medication Sig Dispense Refill   • buPROPion SR (WELLBUTRIN SR) 150 MG 12 hr tablet Take 150 mg by mouth Daily.     • coenzyme Q10 100 MG capsule Take 200 mg by mouth Daily.     • dicyclomine (BENTYL) 20 MG tablet Take 20 mg by mouth Every 6 (Six) Hours As Needed.     • fluticasone (FLONASE) 50 MCG/ACT nasal spray 2 sprays by Each Nare route Daily.     • KRILL OIL PO Take  by mouth.     • letrozole (FEMARA) 2.5 MG tablet TAKE ONE TABLET BY MOUTH DAILY 30 tablet 5   • Milk Thistle 1000 MG capsule Take 240 mg by mouth Daily.     • Multiple Vitamins-Minerals (MULTIVITAMIN WITH MINERALS) tablet tablet Take 1 tablet by mouth Daily.     • [DISCONTINUED] Emollient (COLLAGEN EX) Take 1 package by mouth Daily.     • [DISCONTINUED] meloxicam (MOBIC) 15 MG tablet Take 15 mg by mouth Daily.     • [DISCONTINUED] tiZANidine (ZANAFLEX) 4 MG tablet Take 2 mg by mouth 2 (Two) Times a Day As Needed for Muscle Spasms.     • [DISCONTINUED] Unable to find 1 each 1 (One) Time. Med Name: sclerosing injections  7 injections every other week  1st injection was 2/4/19       No current facility-administered medications on file prior to visit.         ALLERGIES:  No Known Allergies     Social History     Socioeconomic History   • Marital status:      Spouse name: Tommy   • Number of children: 4   • Years of education: Technical school   • Highest education level: Not on file   Occupational History     Employer: RETIRED   Social Needs   • Financial resource strain: Not hard at all   • Food insecurity:     Worry: Never true     Inability: Never true   • Transportation needs:      Medical: No     Non-medical: No   Tobacco Use   • Smoking status: Former Smoker     Packs/day: 0.50     Years: 10.00     Pack years: 5.00     Types: Cigarettes     Last attempt to quit:      Years since quittin.1   • Smokeless tobacco: Never Used   • Tobacco comment: QUIT 20-30 YEARS AGO   Substance and Sexual Activity   • Alcohol use: Yes     Alcohol/week: 7.0 - 14.0 standard drinks     Types: 7 - 14 Glasses of wine per week     Frequency: 4 or more times a week     Drinks per session: 1 or 2     Binge frequency: Never   • Drug use: No   • Sexual activity: Yes     Partners: Male     Birth control/protection: Post-menopausal, Surgical     Comment: spouse, Tommy        Family History   Problem Relation Age of Onset   • Breast cancer Mother 70        had it twice   • Esophageal cancer Father 80   • Leukemia Brother    • Colon cancer Brother    • Malig Hyperthermia Neg Hx       ONCOLOGIC HISTORY:I have been asked to see this patient in consultation today by Willian Adams MD after she has undergone a left breast lumpectomy in the background of abnormal mammogram that has been an ongoing issue since first 2018. On that occasion she was seen by her obstetrician/gynecologist and a mammogram and ultrasound were on a couple of occasions negative benign. The patient was reviewed again by Dr. Adams by the end of  and given the fact that the patient was having minimal nipple retraction, it was decided to go ahead and proceed with a biopsy that documented ductal carcinoma invasive with lobular component and DCIS. Since then the patient has undergone a lumpectomy and sentinel lymph node sampling. The patient is known for having breast implants that have been present for at least 10 or 12 years. These structures were not touched or modified during the recent surgical time. In any event, the patient has healed well from surgery. She has some leftover pain in the left axilla and minimal numbness but otherwise  good rotation and range of motion for her left upper extremity. Her appetite has been normal. Her bowel function has been normal. Urination has been normal. She has not had any cardiovascular or respiratory issues of any kind and no bone pain or joint pain. She has had chronic back pain now for almost 2 years related to degenerative disease and previous surgery. This pain is no different in character or timing. The patient denies any neurological symptomatology. Otherwise, she is in excellent health. ONCOTYPE DX LOW RISK, ADJUVANT FEMARA INITIATED 1/19 X 5 YEARS      Review of Systems       General: no fever, no chills, no fatigue,no weight changes, no lack of appetite.  Eyes: no epiphora, xerophthalmia,conjunctivitis, pain, glaucoma, blurred vision, blindness, secretion, photophobia, proptosis, diplopia.  Ears: no otorrhea, tinnitus, otorrhagia, deafness, pain, vertigo.  Nose: no rhinorrhea, no epistaxis, no alteration in perception of odors, no sinuses pressure.  Mouth: no alteration in gums or teeth,  No ulcers, no difficulty with mastication or deglut ion, no odynophagia.  Neck: no masses or pain, no thyroid alterations, no pain in muscles or arteries, no carotid odynia, no crepitation.  Respiratory: no cough, no sputum production,no dyspnea,no trepopnea, no pleuritic pain,no hemoptysis.  Heart: no syncope, no irregularity, no palpitations, no angina,no orthopnea,no paroxysmal nocturnal dyspnea.  Vascular Venous: no tenderness,no edema,no palpable cords,no postphlebitic syndrome, no skin changes no ulcerations.  Vascular Arterial: no distal ischemia, noclaudication, no gangrene, no neuropathic ischemic pain, no skin ulcers, no paleness no cyanosis.  GI: no dysphagia, no odynophagia, no regurgitation, no heartburn,no indigestion,no nausea,no vomiting,no hematemesis ,no melena,no jaundice,no distention, no obstipation,no enterorrhagia,no proctalgia,no anal  lesions, no changes in bowel habits.  : no frequency,  "no hesitancy, no hematuria, no discharge,no  pain.  Musculoskeletal: STATED muscle or tendon pain or inflammation,no  joint pain, no edema, no functional limitation,no fasciculations, no mass.  Neurologic: no headache, no seizures, noalterations on Craneal nerves, no motor deficit, no sensory deficit, normal coordination, no alteration in memory,normal orientation, calculation,normal writting, verbal and written language.  Skin: no rashes,no pruritus no localized lesions.  Psychiatric: no anxiety, no depression,no agitation, no delusions, proper insight.        Objective     Vitals:    02/13/20 1552   BP: 161/88   Pulse: 74   Resp: 12   Temp: 98.9 °F (37.2 °C)   TempSrc: Oral   SpO2: 98%   Weight: 58.7 kg (129 lb 4.8 oz)   Height: 160.7 cm (63.27\")  Comment: new height without shoes   PainSc: 4  Comment: pain in  the Rt shoulder and arm     Current Status 2/13/2020   ECOG score 0       Physical Exam    GENERAL:  Well-developed, well-nourished  Patient  in no acute distress.   SKIN:  Warm, dry ,NO rashes,NO purpura ,NO petechiae.  HEENT:  Pupils were equal and reactive to light and accomodation, conjunctivas non injected, no pterigion, normal extraocular movements, normal visual acuity.   Mouth mucosa was moist, no exudates in oropharynx, normal gum line, normal roof of the mouth and pillars, normal papillations of the tongue.  NECK:  Supple with good range of motion; no thyromegaly or masses, no JVD or bruits, no cervical adenopathies.No carotid arteries pain, no carotid abnormal pulsation , NO arterial dance.  LYMPHATICS:  No cervical, NO supraclavicular, NO axillary,NO epitrochlear , NO inguinal adenopathy.  CHEST:  Normal excursion of both yung thoraces, normal voice fremitus, no subcutaneous emphysema, normal axillas, no rashes or acanthosis nigricans. Lungs clear to percussion and auscultation, normal breath sounds bilaterally, no wheezing,NO crackles NO ronchi, NO stridor, NO rubs.  CARDIAC AND VASCULAR:  " normal rate and regular rhythm, without murmurs,NO rubs NO S3 NO S4 right or left . Normal femoral, popliteal, pedis, brachial and carotid pulses.  INSPECTION of  breast documented symmetry of the tissue per se and location and size of the nipple,no retractions or inversion of the nipple, normal skin without lesions, no erythema or nodules,no paud'orange, no prominence of superficial veins or chest wall collateral circulation.PALPATION of the breast documented normal skin turgor, no induration, alteration in local temperature, or pain, no palpable masses or nodules, normal mobility of the tissues,no fixation of the tissue or parenchyma to the chest wall, no alteration at the tail of the breasts or axillas, no adenopathies. Surgical site was well healed.No lymphedema in either extremity.  BILATERAL IMPLANT NO ALTERATIONS ON THEM.There is a small nodule that is 8 mm in size, perfectly round that feels like a fat lobule in the anterior aspect of the left breast close to the area where the nipple used to be located that is nontender, nonmobile, is relatively soft and almost fat consistency. A careful methodic examination of the left breast discloses no other abnormalities. Careful methodic examination of the left axilla disclosed no abnormalities.     Methodic examination of the right axilla as well as right shoulder area and right arm disclosed no areas of tenderness and could not trigger or find any areas of pain in the soft tissues, muscles, tendons in the right upper extremity.      ABDOMEN:  Soft, nontender with no organomegaly or masses, no ascites, no collateral circulation,no distention,no [place] sign, no abdominal pain, no inguinal hernias,no umbilical hernia, no abdominal bruits. Normal bowel sounds.  GENITAL: Not  Performed.  EXTREMITIES  AND SPINE:  No clubbing, cyanosis or edema, no deformities or pain .No kyphosis, scoliosis, deformities or pain in spine, ribs or pelvic bone.  NEUROLOGICAL:  Patient was  awake, alert, oriented to time, person and place.                              RECENT LABS:  Hematology WBC   Date Value Ref Range Status   08/13/2019 5.22 3.40 - 10.80 10*3/mm3 Final     RBC   Date Value Ref Range Status   08/13/2019 4.15 3.77 - 5.28 10*6/mm3 Final     Hemoglobin   Date Value Ref Range Status   08/13/2019 14.1 12.0 - 15.9 g/dL Final     Hematocrit   Date Value Ref Range Status   08/13/2019 40.6 34.0 - 46.6 % Final     Platelets   Date Value Ref Range Status   08/13/2019 201 140 - 450 10*3/mm3 Final            Assessment/Plan  In summary, I have a 58-year-old white female who has been extremely healthy all her life who noticed abnormalities with deviation of the nipple in the summer of 2018 on the left breast. Repeat ultrasound and mammograms documented so called “cysts” that looked benign. Given the persistency of the abnormality and her concern, she was again seen by Dr. Adams in 12/2018. She underwent a biopsy that turned out to document a ductal carcinoma with lobular feature, grade 2. This triggered subsequently an MRI that disclosed on 01/09/2019, no residual malignancy and this triggered a lumpectomy that documented a residual malignancy that was 16 mm in size. This malignancy documented an invasive ductal with lobular features grade 2 and also she had a 5 mm foci of DCIS. [place] lymph nodes were removed, none of them having any malignant metastatic disease. Margins of resection were negative.     The patient also had analysis of the tumor for ER strongly positive 98%, AK positive, HER2/mak negative by immunohistochemistry. Ki-67 was 11%.     In summary, I do believe that the so-called cystic lesions close to the nipple in the last year were probably representation of the lobular component of her disease and later on this became more obvious and triggered further radiological analysis. The patient has stage I breast cancer on the left. She has completed a lumpectomy with complete  resection, negative margins, negative sentinel lymph nodes. The tumor is highly ER positive, IA positive, HER2/mak 1+ by immunohistochemistry and negative sentinel lymph nodes. Ki-67. To me this is a very prognostic feature.     Also the patient has undergone genetic analysis and all of this was negative. This was done given the fact that the patient’s mother developed breast cancer at age 60 and she  as a consequence of breast cancer years later.       After reviewing her Oncotype DX  along with her  and providing her the report her risk of recurrence score is 19 therefore she is in the low risk category and the benefit of chemotherapy for her will be minimal if any at all. On the other hand she will benefit from hormonal therapy. For this reason I discussed with her the fact that I would like for her to initiate Femara at a dose of 2.5 mg a day for the next 5 years. The medicine will be not only protecting her from developing recurrent breast cancer but will be effective as well decreasing the chance of contralateral new primary breast cancer. I explained to her side-effects of the medicine including joint pain that most of the time is transitory and improves with exercise and also the development of worsening osteoporosis. The patient has had a bone density test done more than 2 years ago and I am going to go ahead and schedule a bone density test before she returns back in 5 weeks.     Upon reviewing the patient on 2020, she was doing fantastic with excellent tolerance to Femara and on clinical examination, her breast implants disclosed no obvious abnormalities. The wall nodule documented in the anterior aspect of the left breast close to where the nipple used to be measures 8 mm in size and has the consistency of a fat lobule. Careful methodic examination of the axillae and upper extremities disclosed no alterations.     I do believe that the patient’s tolerance to her Femara is excellent. She  has not had any issues with the medication and she remains as well on her vitamin D supplementation. She remains extremely physically active. She is not obese. She is extremely healthy in her eating and she has not changed her lifestyle.     I cannot pinpoint the cause of the pain in the right arm short of something related to her breast implant on the right side at the time of the surgery. The pain sounds like neuropathic more than soft tissue or joint pain. It does not show any abnormality in the muscle upon palpation. The bone looks intact and there is no trigger point that I can find. There is no sweet spot either. Therefore, I think this could be treated locally with Icy Hot and things of that nature and physical activity. I do not think the Femara has anything to do with this either.     I advised her to remain on her Femara 2.5 mg a day. I advised her to continue checking on the nodule in the left breast and if she notices any other changes to let us know.     Otherwise, I will review her back in 6 months, planning to do a CBC and a CMP then. I discussed all these facts with her and her  in detail.

## 2020-02-17 ENCOUNTER — TELEPHONE (OUTPATIENT)
Dept: ONCOLOGY | Facility: CLINIC | Age: 60
End: 2020-02-17

## 2020-02-17 NOTE — TELEPHONE ENCOUNTER
I spoke to the pt, she had called in and asked to speak to me. She is having an issue and questioning if it pertains to her medication Femara. She wakes up with an almost amnesia type feeling. Also, she woke up Saturday morning with her RT thumb is tender. I am passing this along to Dr. Malik.

## 2020-02-21 ENCOUNTER — APPOINTMENT (OUTPATIENT)
Dept: LAB | Facility: HOSPITAL | Age: 60
End: 2020-02-21

## 2020-02-21 ENCOUNTER — OFFICE VISIT (OUTPATIENT)
Dept: ONCOLOGY | Facility: CLINIC | Age: 60
End: 2020-02-21

## 2020-02-21 VITALS
OXYGEN SATURATION: 100 % | BODY MASS INDEX: 22.95 KG/M2 | HEART RATE: 82 BPM | DIASTOLIC BLOOD PRESSURE: 77 MMHG | TEMPERATURE: 98.4 F | HEIGHT: 63 IN | WEIGHT: 129.5 LBS | RESPIRATION RATE: 16 BRPM | SYSTOLIC BLOOD PRESSURE: 138 MMHG

## 2020-02-21 DIAGNOSIS — Z17.0 MALIGNANT NEOPLASM OF CENTRAL PORTION OF LEFT BREAST IN FEMALE, ESTROGEN RECEPTOR POSITIVE (HCC): Primary | ICD-10-CM

## 2020-02-21 DIAGNOSIS — C50.112 MALIGNANT NEOPLASM OF CENTRAL PORTION OF LEFT BREAST IN FEMALE, ESTROGEN RECEPTOR POSITIVE (HCC): Primary | ICD-10-CM

## 2020-02-21 PROCEDURE — 99214 OFFICE O/P EST MOD 30 MIN: CPT | Performed by: NURSE PRACTITIONER

## 2020-02-24 NOTE — PROGRESS NOTES
Subjective     REASON FOR FOLLOW UP:  Left breast cancer 15 mm in size, ductal invasive, grade II,small lobular component, and dcis, sp/ lumpectomy, negative sentinel lymph nodes, Er + . Pr positive her 2 negative by IHC, ONCOTYPE DX LOW RISK, ON ADJUVANT FEMARA FOR 5 YEARS STARTED AFTER SURGERY ON 2 /19    History of Present Illness patient is a 59-year-old female with the above-mentioned history here today with complaints of memory problems.  She has been on Femara 2.5 mg daily since March of last year.  She states that she has noticed memory problems, which is been ongoing since she started the medication.  She states that she forgot to mention this to Dr. Middleton last week.  She states that when she takes it at bedtime she feels like it almost has an amnesia affect, where she cannot remember call things that happened the prior evening.  She states that there have been times that her and her  of made love and she does not recall it.  She states that she has notepad all around her house where she is having to write things down.  The patient tried changing the medication to the morning last Tuesday, and states that now she is having significant fatigue.  She did have a period of time last year where she briefly held the medication and reports that the symptoms resolved.  She denies headaches.  She does have vision problems, but this is been an ongoing thing for her.      Past Medical History:   Diagnosis Date   • Arthritis     BACK   • Breast lump     LEFT   • Cancer (CMS/[facility]) 02/2019    BREAST LEFT   • Hip pain, left    • History of back pain    • History of colon polyp    • History of osteopenia    • IBS (irritable bowel syndrome)    • Mild scoliosis    • Numbness in feet     LEFT SECOND TOE, GETTING INJECTIONS FOR THIS   • Seasonal allergies         Past Surgical History:   Procedure Laterality Date   • ANTERIOR AND POSTERIOR VAGINAL REPAIR  04/2010   • BREAST AUGMENTATION Bilateral 2015   • BREAST  BIOPSY Left 12/20/2018    Procedure: BREAST BIOPSY;  Surgeon: Mike Adams MD;  Location: [facility] OR;  Service: General   • BREAST LUMPECTOMY WITH SENTINEL NODE BIOPSY Left 2/20/2019    Procedure: BREAST LUMPECTOMY WITH SENTINEL NODE BIOPSY;  Surgeon: Mike Adams MD;  Location: [facility] OR;  Service: General   • BUNIONECTOMY Bilateral     RIGHT       LEFT 2-2015   • HERNIA REPAIR Left 03/1994    INGUINAL    • HERNIA REPAIR Left 04/1996    FEMORAL   • HYSTERECTOMY  02/2001   • LUMBAR FUSION  07/11/2018    L 4-5        Current Outpatient Medications on File Prior to Visit   Medication Sig Dispense Refill   • coenzyme Q10 100 MG capsule Take 200 mg by mouth Daily.     • dicyclomine (BENTYL) 20 MG tablet Take 20 mg by mouth Every 6 (Six) Hours As Needed.     • fluticasone (FLONASE) 50 MCG/ACT nasal spray 2 sprays by Each Nare route Daily.     • KRILL OIL PO Take  by mouth.     • letrozole (FEMARA) 2.5 MG tablet TAKE ONE TABLET BY MOUTH DAILY 30 tablet 5   • Milk Thistle 1000 MG capsule Take 240 mg by mouth Daily.     • Multiple Vitamins-Minerals (MULTIVITAMIN WITH MINERALS) tablet tablet Take 1 tablet by mouth Daily.     • buPROPion SR (WELLBUTRIN SR) 150 MG 12 hr tablet Take 150 mg by mouth Daily.       No current facility-administered medications on file prior to visit.         ALLERGIES:  No Known Allergies     Social History     Socioeconomic History   • Marital status:      Spouse name: Tommy   • Number of children: 4   • Years of education: Technical school   • Highest education level: Not on file   Occupational History     Employer: RETIRED   Social Needs   • Financial resource strain: Not hard at all   • Food insecurity:     Worry: Never true     Inability: Never true   • Transportation needs:     Medical: No     Non-medical: No   Tobacco Use   • Smoking status: Former Smoker     Packs/day: 0.50     Years: 10.00     Pack years: 5.00     Types: Cigarettes     Last attempt  to quit: 1987     Years since quittin.1   • Smokeless tobacco: Never Used   • Tobacco comment: QUIT 20-30 YEARS AGO   Substance and Sexual Activity   • Alcohol use: Yes     Alcohol/week: 7.0 - 14.0 standard drinks     Types: 7 - 14 Glasses of wine per week     Frequency: 4 or more times a week     Drinks per session: 1 or 2     Binge frequency: Never   • Drug use: No   • Sexual activity: Yes     Partners: Male     Birth control/protection: Post-menopausal, Surgical     Comment: spouse, Tommy        Family History   Problem Relation Age of Onset   • Breast cancer Mother 70        had it twice   • Esophageal cancer Father 80   • Leukemia Brother    • Colon cancer Brother    • Malig Hyperthermia Neg Hx       ONCOLOGIC HISTORY:I have been asked to see this patient in consultation today by Willian Adams MD after she has undergone a left breast lumpectomy in the background of abnormal mammogram that has been an ongoing issue since first 2018. On that occasion she was seen by her obstetrician/gynecologist and a mammogram and ultrasound were on a couple of occasions negative benign. The patient was reviewed again by Dr. Adams by the end of  and given the fact that the patient was having minimal nipple retraction, it was decided to go ahead and proceed with a biopsy that documented ductal carcinoma invasive with lobular component and DCIS. Since then the patient has undergone a lumpectomy and sentinel lymph node sampling. The patient is known for having breast implants that have been present for at least 10 or 12 years. These structures were not touched or modified during the recent surgical time. In any event, the patient has healed well from surgery. She has some leftover pain in the left axilla and minimal numbness but otherwise good rotation and range of motion for her left upper extremity. Her appetite has been normal. Her bowel function has been normal. Urination has been normal. She has not had any  "cardiovascular or respiratory issues of any kind and no bone pain or joint pain. She has had chronic back pain now for almost 2 years related to degenerative disease and previous surgery. This pain is no different in character or timing. The patient denies any neurological symptomatology. Otherwise, she is in excellent health. ONCOTYPE DX LOW RISK, ADJUVANT FEMARA INITIATED 1/19 X 5 YEARS      Review of Systems   Constitutional: Negative for activity change, appetite change, chills, fatigue and fever.   HENT: Negative for mouth sores, nosebleeds and trouble swallowing.    Respiratory: Negative for cough and shortness of breath.    Cardiovascular: Negative for chest pain and leg swelling.   Gastrointestinal: Negative for abdominal pain, constipation, diarrhea, nausea and vomiting.   Genitourinary: Negative for difficulty urinating.   Skin: Negative for rash.   Neurological: Negative for dizziness, weakness and numbness.        See HPI   Hematological: Negative for adenopathy. Does not bruise/bleed easily.   Psychiatric/Behavioral: Negative for sleep disturbance.        Objective     Vitals:    02/21/20 1134   BP: 138/77   Pulse: 82   Resp: 16   Temp: 98.4 °F (36.9 °C)   TempSrc: Oral   SpO2: 100%   Weight: 58.7 kg (129 lb 8 oz)   Height: 160.7 cm (63.27\")   PainSc: 0-No pain     Current Status 2/21/2020   ECOG score 0       Physical Exam   Constitutional: She is oriented to person, place, and time. She appears well-developed and well-nourished. No distress.   HENT:   Head: Normocephalic and atraumatic.   Mouth/Throat: Oropharynx is clear and moist and mucous membranes are normal. No oropharyngeal exudate.   Eyes: Pupils are equal, round, and reactive to light.   Neck: Normal range of motion.   Cardiovascular: Normal rate, regular rhythm and normal heart sounds.   No murmur heard.  Pulmonary/Chest: Effort normal and breath sounds normal. No respiratory distress. She has no wheezes. She has no rhonchi. She has no " rales.   Abdominal: Soft. Normal appearance and bowel sounds are normal. She exhibits no distension. There is no hepatosplenomegaly.   Musculoskeletal: Normal range of motion. She exhibits no edema.   Neurological: She is alert and oriented to person, place, and time.   Skin: Skin is warm and dry. No rash noted.   Psychiatric: She has a normal mood and affect.   Vitals reviewed.        RECENT LABS:  Hematology WBC   Date Value Ref Range Status   08/13/2019 5.22 3.40 - 10.80 10*3/mm3 Final     RBC   Date Value Ref Range Status   08/13/2019 4.15 3.77 - 5.28 10*6/mm3 Final     Hemoglobin   Date Value Ref Range Status   08/13/2019 14.1 12.0 - 15.9 g/dL Final     Hematocrit   Date Value Ref Range Status   08/13/2019 40.6 34.0 - 46.6 % Final     Platelets   Date Value Ref Range Status   08/13/2019 201 140 - 450 10*3/mm3 Final            Assessment/Plan    1.  Breast Cancer: noticed abnormalities with deviation of the nipple in the summer of 2018 on the left breast. Repeat ultrasound and mammograms documented so called “cysts” that looked benign. Given the persistency of the abnormality and her concern, she was again seen by Dr. Adams in 12/2018. She underwent a biopsy that turned out to document a ductal carcinoma with lobular feature, grade 2. This triggered subsequently an MRI that disclosed on 01/09/2019, no residual malignancy and this triggered a lumpectomy that documented a residual malignancy that was 16 mm in size. This malignancy documented an invasive ductal with lobular features grade 2 and also she had a 5 mm foci of DCIS. [place] lymph nodes were removed, none of them having any malignant metastatic disease. Margins of resection were negative.     The patient also had analysis of the tumor for ER strongly positive 98%, IL positive, HER2/mak negative by immunohistochemistry. Ki-67 was 11%.     In summary, I do believe that the so-called cystic lesions close to the nipple in the last year were probably  representation of the lobular component of her disease and later on this became more obvious and triggered further radiological analysis. The patient has stage I breast cancer on the left. She has completed a lumpectomy with complete resection, negative margins, negative sentinel lymph nodes. The tumor is highly ER positive, AL positive, HER2/mak 1+ by immunohistochemistry and negative sentinel lymph nodes. Ki-67. To me this is a very prognostic feature.     Also the patient has undergone genetic analysis and all of this was negative. This was done given the fact that the patient’s mother developed breast cancer at age 60 and she  as a consequence of breast cancer years later.       After reviewing her Oncotype DX  along with her  and providing her the report her risk of recurrence score is 19 therefore she is in the low risk category and the benefit of chemotherapy for her will be minimal if any at all. On the other hand she will benefit from hormonal therapy. For this reason I discussed with her the fact that I would like for her to initiate Femara at a dose of 2.5 mg a day for the next 5 years. The medicine will be not only protecting her from developing recurrent breast cancer but will be effective as well decreasing the chance of contralateral new primary breast cancer. I explained to her side-effects of the medicine including joint pain that most of the time is transitory and improves with exercise and also the development of worsening osteoporosis. The patient has had a bone density test done more than 2 years ago and I am going to go ahead and schedule a bone density test before she returns back in 5 weeks.     Upon reviewing the patient on 2020, she was doing fantastic with excellent tolerance to Femara and on clinical examination, her breast implants disclosed no obvious abnormalities. The wall nodule documented in the anterior aspect of the left breast close to where the nipple used to be  measures 8 mm in size and has the consistency of a fat lobule. Careful methodic examination of the axillae and upper extremities disclosed no alterations.     Patient returns 2/21/2020 with complaints of memory problems since being on Femara.  She denies headaches. She did a trial of holding the medication several months back and states that her symptoms did improve.  We will have her discontinue  Femara.  Return in 1 month for reevaluation by Dr. Malik.

## 2020-04-14 ENCOUNTER — TELEPHONE (OUTPATIENT)
Dept: ONCOLOGY | Facility: CLINIC | Age: 60
End: 2020-04-14

## 2020-04-14 ENCOUNTER — APPOINTMENT (OUTPATIENT)
Dept: LAB | Facility: HOSPITAL | Age: 60
End: 2020-04-14

## 2020-04-14 ENCOUNTER — OFFICE VISIT (OUTPATIENT)
Dept: ONCOLOGY | Facility: CLINIC | Age: 60
End: 2020-04-14

## 2020-04-14 DIAGNOSIS — R41.3 MEMORY DEFICITS: ICD-10-CM

## 2020-04-14 DIAGNOSIS — Z17.0 MALIGNANT NEOPLASM OF CENTRAL PORTION OF LEFT BREAST IN FEMALE, ESTROGEN RECEPTOR POSITIVE (HCC): Primary | ICD-10-CM

## 2020-04-14 DIAGNOSIS — C50.112 MALIGNANT NEOPLASM OF CENTRAL PORTION OF LEFT BREAST IN FEMALE, ESTROGEN RECEPTOR POSITIVE (HCC): Primary | ICD-10-CM

## 2020-04-14 PROBLEM — N63.20 LEFT BREAST MASS: Status: RESOLVED | Noted: 2018-12-17 | Resolved: 2020-04-14

## 2020-04-14 PROCEDURE — 99442 PR PHYS/QHP TELEPHONE EVALUATION 11-20 MIN: CPT | Performed by: INTERNAL MEDICINE

## 2020-04-14 RX ORDER — ANASTROZOLE 1 MG/1
1 TABLET ORAL DAILY
Qty: 30 TABLET | Refills: 6 | Status: SHIPPED | OUTPATIENT
Start: 2020-04-14 | End: 2020-11-16

## 2020-04-14 NOTE — PROGRESS NOTES
Subjective     REASON FOR FOLLOW UP:  Left breast cancer 15 mm in size, ductal invasive, grade II,small lobular component, and dcis, sp/ lumpectomy, negative sentinel lymph nodes, Er + . Pr positive her 2 negative by IHC, ONCOTYPE DX LOW RISK, ON ADJUVANT FEMARA FOR 5 YEARS STARTED AFTER SURGERY ON 2 /19    History of Present Illness This is an eVisit today given coronavirus crisis. First of all the patient has verbally consented for me to have this eVisit today.  You have chosen to receive care through a telephone visit. Do you consent to use a telephone visit for your medical care today? YES   Overall the patient at this time has continued doing fantastic. Her appetite is good, her weight is stable, her bowel function is normal, urination is normal, no cardiovascular or respiratory issues of any nature, no infections of any kind. She has not had any bone or joint pain. She discontinued her Femara on 02/21/2020 given issues with memory. Actually the patient was waking up in the morning not remembering what happened the night before while taking the medicine. Two weeks after stopping the medication her memory seems to be that cleared up completely and she has not had any further issues since then. She has not had any headache, blurred vision, nausea, vomiting or any other neurological symptomatology. Her diet is appropriate and fully normal amount of nutrients. She has not had any other new pertinent issues to her health. She has been put in the house afraid of developing coronavirus infection.          Past Medical History:   Diagnosis Date   • Arthritis     BACK   • Breast lump     LEFT   • Cancer (CMS/[facility]) 02/2019    BREAST LEFT   • Hip pain, left    • History of back pain    • History of colon polyp    • History of osteopenia    • IBS (irritable bowel syndrome)    • Mild scoliosis    • Numbness in feet     LEFT SECOND TOE, GETTING INJECTIONS FOR THIS   • Seasonal allergies         Past Surgical History:    Procedure Laterality Date   • ANTERIOR AND POSTERIOR VAGINAL REPAIR  04/2010   • BREAST AUGMENTATION Bilateral 2015   • BREAST BIOPSY Left 12/20/2018    Procedure: BREAST BIOPSY;  Surgeon: Mike Adams MD;  Location: [facility];  Service: General   • BREAST LUMPECTOMY WITH SENTINEL NODE BIOPSY Left 2/20/2019    Procedure: BREAST LUMPECTOMY WITH SENTINEL NODE BIOPSY;  Surgeon: Mike Adams MD;  Location: [facility] OR;  Service: General   • BUNIONECTOMY Bilateral     RIGHT       LEFT 2-2015   • HERNIA REPAIR Left 03/1994    INGUINAL    • HERNIA REPAIR Left 04/1996    FEMORAL   • HYSTERECTOMY  02/2001   • LUMBAR FUSION  07/11/2018    L 4-5        Current Outpatient Medications on File Prior to Visit   Medication Sig Dispense Refill   • buPROPion SR (WELLBUTRIN SR) 150 MG 12 hr tablet Take 150 mg by mouth Daily.     • coenzyme Q10 100 MG capsule Take 200 mg by mouth Daily.     • dicyclomine (BENTYL) 20 MG tablet Take 20 mg by mouth Every 6 (Six) Hours As Needed.     • fluticasone (FLONASE) 50 MCG/ACT nasal spray 2 sprays by Each Nare route Daily.     • KRILL OIL PO Take  by mouth.     • letrozole (FEMARA) 2.5 MG tablet TAKE ONE TABLET BY MOUTH DAILY 30 tablet 5   • Milk Thistle 1000 MG capsule Take 240 mg by mouth Daily.     • Multiple Vitamins-Minerals (MULTIVITAMIN WITH MINERALS) tablet tablet Take 1 tablet by mouth Daily.       No current facility-administered medications on file prior to visit.         ALLERGIES:  No Known Allergies     Social History     Socioeconomic History   • Marital status:      Spouse name: Tommy   • Number of children: 4   • Years of education: Technical school   • Highest education level: Not on file   Occupational History     Employer: RETIRED   Social Needs   • Financial resource strain: Not hard at all   • Food insecurity:     Worry: Never true     Inability: Never true   • Transportation needs:     Medical: No     Non-medical: No   Tobacco Use    • Smoking status: Former Smoker     Packs/day: 0.50     Years: 10.00     Pack years: 5.00     Types: Cigarettes     Last attempt to quit:      Years since quittin.3   • Smokeless tobacco: Never Used   • Tobacco comment: QUIT 20-30 YEARS AGO   Substance and Sexual Activity   • Alcohol use: Yes     Alcohol/week: 7.0 - 14.0 standard drinks     Types: 7 - 14 Glasses of wine per week     Frequency: 4 or more times a week     Drinks per session: 1 or 2     Binge frequency: Never   • Drug use: No   • Sexual activity: Yes     Partners: Male     Birth control/protection: Post-menopausal, Surgical     Comment: spouse, Tommy        Family History   Problem Relation Age of Onset   • Breast cancer Mother 70        had it twice   • Esophageal cancer Father 80   • Leukemia Brother    • Colon cancer Brother    • Malig Hyperthermia Neg Hx       ONCOLOGIC HISTORY:I have been asked to see this patient in consultation today by Willian Adams MD after she has undergone a left breast lumpectomy in the background of abnormal mammogram that has been an ongoing issue since first 2018. On that occasion she was seen by her obstetrician/gynecologist and a mammogram and ultrasound were on a couple of occasions negative benign. The patient was reviewed again by Dr. Adams by the end of 2018 and given the fact that the patient was having minimal nipple retraction, it was decided to go ahead and proceed with a biopsy that documented ductal carcinoma invasive with lobular component and DCIS. Since then the patient has undergone a lumpectomy and sentinel lymph node sampling. The patient is known for having breast implants that have been present for at least 10 or 12 years. These structures were not touched or modified during the recent surgical time. In any event, the patient has healed well from surgery. She has some leftover pain in the left axilla and minimal numbness but otherwise good rotation and range of motion for her left  upper extremity. Her appetite has been normal. Her bowel function has been normal. Urination has been normal. She has not had any cardiovascular or respiratory issues of any kind and no bone pain or joint pain. She has had chronic back pain now for almost 2 years related to degenerative disease and previous surgery. This pain is no different in character or timing. The patient denies any neurological symptomatology. Otherwise, she is in excellent health. ONCOTYPE DX LOW RISK, ADJUVANT FEMARA INITIATED 1/19 X 5 YEARS. FEMARA STOPPED 2/21/2020 DUE TO MEMORY DEFICIT.ARIMIDEX 1 MG STARTED 4/14/2020 AND TO CALL IN A WEEK TO EVALUTE MEMORY ISSUES WITH THIS MEDICATION      ROS:  General: no fever, no chills, no fatigue,no weight changes, no lack of appetite.  Eyes: no epiphora, xerophthalmia,conjunctivitis, pain, glaucoma, blurred vision, blindness, secretion, photophobia, proptosis, diplopia.  Ears: no otorrhea, tinnitus, otorrhagia, deafness, pain, vertigo.  Nose: no rhinorrhea, no epistaxis, no alteration in perception of odors, no sinuses pressure.  Mouth: no alteration in gums or teeth,  No ulcers, no difficulty with mastication or deglut ion, no odynophagia.  Neck: no masses or pain, no thyroid alterations, no pain in muscles or arteries, no carotid odynia, no crepitation.  Respiratory: no cough, no sputum production,no dyspnea,no trepopnea, no pleuritic pain,no hemoptysis.  Heart: no syncope, no irregularity, no palpitations, no angina,no orthopnea,no paroxysmal nocturnal dyspnea.  Vascular Venous: no tenderness,no edema,no palpable cords,no postphlebitic syndrome, no skin changes no ulcerations.  Vascular Arterial: no distal ischemia, noclaudication, no gangrene, no neuropathic ischemic pain, no skin ulcers, no paleness no cyanosis.  GI: no dysphagia, no odynophagia, no regurgitation, no heartburn,no indigestion,no nausea,no vomiting,no hematemesis ,no melena,no jaundice,no distention, no obstipation,no  enterorrhagia,no proctalgia,no anal  lesions, no changes in bowel habits.  : no frequency, no hesitancy, no hematuria, no discharge,no  pain.  Musculoskeletal: no muscle or tendon pain or inflammation,no  joint pain, no edema, no functional limitation,no fasciculations, no mass.  Neurologic: no headache, no seizures, noalterations on Craneal nerves, no motor deficit, no sensory deficit, normal coordination,NORMALIZATION OF  alteration in memory,normal orientation, calculation,normal writting, verbal and written language.  Skin: no rashes,no pruritus no localized lesions.  Psychiatric: no anxiety, no depression,no agitation, no delusions, proper insight.    Objective     There were no vitals filed for this visit.  Current Status 2/21/2020   ECOG score 0       EXAM: NONE TELEPHONIC VISIT , STABLE WITH, NORMAL PHYSICAL ACTIVITY AT HOME AND HOME EXERCISE PLAN     RECENT LABS:  Hematology WBC   Date Value Ref Range Status   08/13/2019 5.22 3.40 - 10.80 10*3/mm3 Final     RBC   Date Value Ref Range Status   08/13/2019 4.15 3.77 - 5.28 10*6/mm3 Final     Hemoglobin   Date Value Ref Range Status   08/13/2019 14.1 12.0 - 15.9 g/dL Final     Hematocrit   Date Value Ref Range Status   08/13/2019 40.6 34.0 - 46.6 % Final     Platelets   Date Value Ref Range Status   08/13/2019 201 140 - 450 10*3/mm3 Final            Assessment/Plan    1.  Breast Cancer: noticed abnormalities with deviation of the nipple in the summer of 2018 on the left breast. Repeat ultrasound and mammograms documented so called “cysts” that looked benign. Given the persistency of the abnormality and her concern, she was again seen by Dr. Adams in 12/2018. She underwent a biopsy that turned out to document a ductal carcinoma with lobular feature, grade 2. This triggered subsequently an MRI that disclosed on 01/09/2019, no residual malignancy and this triggered a lumpectomy that documented a residual malignancy that was 16 mm in size. This malignancy  documented an invasive ductal with lobular features grade 2 and also she had a 5 mm foci of DCIS. Hawkins lymph nodes were removed, none of them having any malignant metastatic disease. Margins of resection were negative.     The patient also had analysis of the tumor for ER strongly positive 98%, IA positive, HER2/mak negative by immunohistochemistry. Ki-67 was 11%.     In summary, I do believe that the so-called cystic lesions close to the nipple in the last year were probably representation of the lobular component of her disease and later on this became more obvious and triggered further radiological analysis. The patient has stage I breast cancer on the left. She has completed a lumpectomy with complete resection, negative margins, negative sentinel lymph nodes. The tumor is highly ER positive, IA positive, HER2/mak 1+ by immunohistochemistry and negative sentinel lymph nodes. Ki-67. To me this is a very prognostic feature.     Also the patient has undergone genetic analysis and all of this was negative. This was done given the fact that the patient’s mother developed breast cancer at age 60 and she  as a consequence of breast cancer years later.       After reviewing her Oncotype DX  along with her  and providing her the report her risk of recurrence score is 19 therefore she is in the low risk category and the benefit of chemotherapy for her will be minimal if any at all. On the other hand she will benefit from hormonal therapy. For this reason I discussed with her the fact that I would like for her to initiate Femara at a dose of 2.5 mg a day for the next 5 years. The medicine will be not only protecting her from developing recurrent breast cancer but will be effective as well decreasing the chance of contralateral new primary breast cancer. I explained to her side-effects of the medicine including joint pain that most of the time is transitory and improves with exercise and also the development  of worsening osteoporosis. The patient has had a bone density test done more than 2 years ago and I am going to go ahead and schedule a bone density test before she returns back in 5 weeks.     Upon reviewing the patient on 02/13/2020, she was doing fantastic with excellent tolerance to Femara and on clinical examination, her breast implants disclosed no obvious abnormalities. The wall nodule documented in the anterior aspect of the left breast close to where the nipple used to be measures 8 mm in size and has the consistency of a fat lobule. Careful methodic examination of the axillae and upper extremities disclosed no alterations.     The patient was further reviewed on an eVisit on 04/14/2020. On 02/21/2020 she was seen by my nurse practitioner because she has complained and forgot to mention that to me issues pertinent to memory loss taking Femara. We reviewed this information in the system and indeed there is a 0.1% of patients taking this medicine that can have memory issues. The medicine was discontinued and 3 weeks later her symptoms subsided completely. She has not had any other episodes since then.     I feel afraid of leaving this patient without any adjuvant hormonal therapy. I propose to her to initiate a program of Arimidex   1 mg everyday and to give us a call in a week and 2 weeks no matter what symptoms are. If she has memory issues again she will be stopping the medication and thereafter we need to have a conversation about tamoxifen use.     Other than that I advised her to be proactive in regard to physical activity. She is doing that. Eventually she will participate at the [facility] in [place] [employer] program for physical activity in cancer patients.    Otherwise her  is staying at home, they are staying healthy and have the proper precautions to minimize any possibility of coronavirus infection.    Appointment desk will be notified to bring her back to see me in a couple of months with a CBC and a  CMP.     I pointed out to the patient that I expect a phone call from her in 1 and 2 weeks in regard to using Arimidex.     DURATION CONVERSATION: 12 MIN 38 SECONDS.    I DISCUSSED WITH PATIENT IN DETAIL FORMS TO DECREASE CHANCES OF CORONAVIRUS INFECTION INCLUDING ISOLATION, PROPER HAND HIGIENE, AVOID PUBLIC PLACES  WITH CROWDS, FOLLOW [employer] AND CDC RECOMENDATIONS, AND KEEP PERSONAL AND SOCIAL RESPONSIBILITY.  PATIENT IS AWARE THIS INFECTION COULD HAVE SEVERE CONSEQUENCES TO PERSONAL HEALTH AND FAMILY RAMIFICATIONS OF THIS.

## 2020-05-15 ENCOUNTER — TELEPHONE (OUTPATIENT)
Dept: ONCOLOGY | Facility: CLINIC | Age: 60
End: 2020-05-15

## 2020-05-15 NOTE — TELEPHONE ENCOUNTER
PT HAD CALLED AND SAID THAT ARIMIDEX WAS WORKING WELL FOR HER SO FAR. ATTEMPTED TO CALL HER BACK AND L/M. MESSAGED DR. CORDOVA TO MAKE HIM AWARE AS HE WANTED TO KNOW PER LAST DICTATION.

## 2020-05-15 NOTE — TELEPHONE ENCOUNTER
DR CORDOVA HAD SWITCHED PT'S MEDICATION ON 4/14/20. PT WANTED TO GIVE DR CORDOVA AN UPDATE AND LET HIM KNOW THE NEW MEDICATION ANASTROZOLE IS WORKING JUST FINE.      SARITHA   [phone]

## 2020-06-01 ENCOUNTER — APPOINTMENT (OUTPATIENT)
Dept: ULTRASOUND IMAGING | Facility: HOSPITAL | Age: 60
End: 2020-06-01

## 2020-06-01 ENCOUNTER — APPOINTMENT (OUTPATIENT)
Dept: MAMMOGRAPHY | Facility: HOSPITAL | Age: 60
End: 2020-06-01

## 2020-06-08 ENCOUNTER — HOSPITAL ENCOUNTER (OUTPATIENT)
Dept: MAMMOGRAPHY | Facility: HOSPITAL | Age: 60
Discharge: HOME | End: 2020-06-08
Admitting: RADIOLOGY

## 2020-06-08 ENCOUNTER — HOSPITAL ENCOUNTER (OUTPATIENT)
Dept: ULTRASOUND IMAGING | Facility: HOSPITAL | Age: 60
Discharge: HOME | End: 2020-06-08

## 2020-06-08 DIAGNOSIS — C50.112 MALIGNANT NEOPLASM OF CENTRAL PORTION OF LEFT BREAST IN FEMALE, ESTROGEN RECEPTOR POSITIVE (HCC): ICD-10-CM

## 2020-06-08 DIAGNOSIS — Z17.0 MALIGNANT NEOPLASM OF CENTRAL PORTION OF LEFT BREAST IN FEMALE, ESTROGEN RECEPTOR POSITIVE (HCC): ICD-10-CM

## 2020-06-08 PROCEDURE — G0279 TOMOSYNTHESIS, MAMMO: HCPCS

## 2020-06-08 PROCEDURE — 77065 DX MAMMO INCL CAD UNI: CPT

## 2020-06-08 PROCEDURE — 76642 ULTRASOUND BREAST LIMITED: CPT

## 2020-06-11 ENCOUNTER — TELEPHONE (OUTPATIENT)
Dept: RADIATION ONCOLOGY | Facility: HOSPITAL | Age: 60
End: 2020-06-11

## 2020-06-11 DIAGNOSIS — C50.112 MALIGNANT NEOPLASM OF CENTRAL PORTION OF LEFT BREAST IN FEMALE, ESTROGEN RECEPTOR POSITIVE (HCC): Primary | ICD-10-CM

## 2020-06-11 DIAGNOSIS — Z17.0 MALIGNANT NEOPLASM OF CENTRAL PORTION OF LEFT BREAST IN FEMALE, ESTROGEN RECEPTOR POSITIVE (HCC): Primary | ICD-10-CM

## 2020-06-11 NOTE — TELEPHONE ENCOUNTER
Called pt to give her the results of her recent mammogram and breast US. Told her everything looked fine and another order was placed for a mammogram and left breast US for December 2020. She verbalized understanding.

## 2020-06-30 ENCOUNTER — OFFICE VISIT (OUTPATIENT)
Dept: ONCOLOGY | Facility: CLINIC | Age: 60
End: 2020-06-30

## 2020-06-30 ENCOUNTER — LAB (OUTPATIENT)
Dept: LAB | Facility: HOSPITAL | Age: 60
End: 2020-06-30

## 2020-06-30 VITALS
SYSTOLIC BLOOD PRESSURE: 167 MMHG | DIASTOLIC BLOOD PRESSURE: 113 MMHG | HEIGHT: 63 IN | BODY MASS INDEX: 24.36 KG/M2 | OXYGEN SATURATION: 99 % | TEMPERATURE: 98 F | WEIGHT: 137.5 LBS | HEART RATE: 78 BPM | RESPIRATION RATE: 18 BRPM

## 2020-06-30 DIAGNOSIS — Z17.0 MALIGNANT NEOPLASM OF CENTRAL PORTION OF LEFT BREAST IN FEMALE, ESTROGEN RECEPTOR POSITIVE (HCC): Primary | ICD-10-CM

## 2020-06-30 DIAGNOSIS — C50.112 MALIGNANT NEOPLASM OF CENTRAL PORTION OF LEFT BREAST IN FEMALE, ESTROGEN RECEPTOR POSITIVE (HCC): Primary | ICD-10-CM

## 2020-06-30 DIAGNOSIS — R41.3 MEMORY DEFICITS: ICD-10-CM

## 2020-06-30 LAB
ALBUMIN SERPL-MCNC: 4.7 G/DL (ref 3.5–5.2)
ALBUMIN/GLOB SERPL: 2 G/DL (ref 1.1–2.4)
ALP SERPL-CCNC: 80 U/L (ref 38–116)
ALT SERPL W P-5'-P-CCNC: 14 U/L (ref 0–33)
ANION GAP SERPL CALCULATED.3IONS-SCNC: 10.5 MMOL/L (ref 5–15)
AST SERPL-CCNC: 21 U/L (ref 0–32)
BASOPHILS # BLD AUTO: 0.02 10*3/MM3 (ref 0–0.2)
BASOPHILS NFR BLD AUTO: 0.5 % (ref 0–1.5)
BILIRUB SERPL-MCNC: 0.3 MG/DL (ref 0.2–1.2)
BUN SERPL-MCNC: 18 MG/DL (ref 6–20)
BUN/CREAT SERPL: 19.1 (ref 7.3–30)
CALCIUM SPEC-SCNC: 9.8 MG/DL (ref 8.5–10.2)
CHLORIDE SERPL-SCNC: 101 MMOL/L (ref 98–107)
CO2 SERPL-SCNC: 27.5 MMOL/L (ref 22–29)
CREAT SERPL-MCNC: 0.94 MG/DL (ref 0.6–1.1)
DEPRECATED RDW RBC AUTO: 42.9 FL (ref 37–54)
EOSINOPHIL # BLD AUTO: 0.07 10*3/MM3 (ref 0–0.4)
EOSINOPHIL NFR BLD AUTO: 1.6 % (ref 0.3–6.2)
ERYTHROCYTE [DISTWIDTH] IN BLOOD BY AUTOMATED COUNT: 11.9 % (ref 12.3–15.4)
GFR SERPL CREATININE-BSD FRML MDRD: 61 ML/MIN/1.73
GLOBULIN UR ELPH-MCNC: 2.4 GM/DL (ref 1.8–3.5)
GLUCOSE SERPL-MCNC: 96 MG/DL (ref 74–124)
HCT VFR BLD AUTO: 40.9 % (ref 34–46.6)
HGB BLD-MCNC: 13.6 G/DL (ref 12–15.9)
IMM GRANULOCYTES # BLD AUTO: 0.01 10*3/MM3 (ref 0–0.05)
IMM GRANULOCYTES NFR BLD AUTO: 0.2 % (ref 0–0.5)
LYMPHOCYTES # BLD AUTO: 1.08 10*3/MM3 (ref 0.7–3.1)
LYMPHOCYTES NFR BLD AUTO: 24.4 % (ref 19.6–45.3)
MCH RBC QN AUTO: 32.5 PG (ref 26.6–33)
MCHC RBC AUTO-ENTMCNC: 33.3 G/DL (ref 31.5–35.7)
MCV RBC AUTO: 97.6 FL (ref 79–97)
MONOCYTES # BLD AUTO: 0.38 10*3/MM3 (ref 0.1–0.9)
MONOCYTES NFR BLD AUTO: 8.6 % (ref 5–12)
NEUTROPHILS NFR BLD AUTO: 2.87 10*3/MM3 (ref 1.7–7)
NEUTROPHILS NFR BLD AUTO: 64.7 % (ref 42.7–76)
NRBC BLD AUTO-RTO: 0 /100 WBC (ref 0–0.2)
PLATELET # BLD AUTO: 155 10*3/MM3 (ref 140–450)
PMV BLD AUTO: 7.9 FL (ref 6–12)
POTASSIUM SERPL-SCNC: 4 MMOL/L (ref 3.5–4.7)
PROT SERPL-MCNC: 7.1 G/DL (ref 6.3–8)
RBC # BLD AUTO: 4.19 10*6/MM3 (ref 3.77–5.28)
SODIUM SERPL-SCNC: 139 MMOL/L (ref 134–145)
WBC # BLD AUTO: 4.43 10*3/MM3 (ref 3.4–10.8)

## 2020-06-30 PROCEDURE — 99213 OFFICE O/P EST LOW 20 MIN: CPT | Performed by: INTERNAL MEDICINE

## 2020-06-30 PROCEDURE — 36415 COLL VENOUS BLD VENIPUNCTURE: CPT

## 2020-06-30 PROCEDURE — 85025 COMPLETE CBC W/AUTO DIFF WBC: CPT

## 2020-06-30 PROCEDURE — 80053 COMPREHEN METABOLIC PANEL: CPT

## 2020-07-13 ENCOUNTER — TELEPHONE (OUTPATIENT)
Dept: ONCOLOGY | Facility: CLINIC | Age: 60
End: 2020-07-13

## 2020-07-13 NOTE — TELEPHONE ENCOUNTER
Patient called bc she has had a lot of swelling in her fingers the past two weeks where she almost had to have rings cut off. Had lymph nodes removed on that side. Recently in may had surgery and they had to put IV in that arm close to hand. Reviewed with Sherri MONREAL and recommended she contact lymphedema clinic to get in and get their recommendations. Pt v/u

## 2020-08-25 ENCOUNTER — HOSPITAL ENCOUNTER (OUTPATIENT)
Dept: PHYSICAL THERAPY | Facility: HOSPITAL | Age: 60
Setting detail: THERAPIES SERIES
Discharge: HOME | End: 2020-08-25

## 2020-08-25 DIAGNOSIS — Z17.0 MALIGNANT NEOPLASM OF CENTRAL PORTION OF LEFT BREAST IN FEMALE, ESTROGEN RECEPTOR POSITIVE (HCC): Primary | ICD-10-CM

## 2020-08-25 DIAGNOSIS — Z98.890 STATUS POST LEFT BREAST LUMPECTOMY: ICD-10-CM

## 2020-08-25 DIAGNOSIS — M79.642 LEFT HAND PAIN: ICD-10-CM

## 2020-08-25 DIAGNOSIS — C50.112 MALIGNANT NEOPLASM OF CENTRAL PORTION OF LEFT BREAST IN FEMALE, ESTROGEN RECEPTOR POSITIVE (HCC): Primary | ICD-10-CM

## 2020-08-25 DIAGNOSIS — Z91.89 AT RISK FOR LYMPHEDEMA: ICD-10-CM

## 2020-08-25 PROCEDURE — 97164 PT RE-EVAL EST PLAN CARE: CPT | Performed by: PHYSICAL THERAPIST

## 2020-08-25 PROCEDURE — 97110 THERAPEUTIC EXERCISES: CPT | Performed by: PHYSICAL THERAPIST

## 2020-08-25 NOTE — THERAPY RE-EVALUATION
Physical Therapy Lymphedema Re-Evaluation  [facility]     Patient Name: Alesha Eldridge  : 1960  MRN: [id number]  Today's Date: 2020      Visit Date: 2020    Visit Dx:    ICD-10-CM ICD-9-CM   1. Malignant neoplasm of central portion of left breast in female, estrogen receptor positive (CMS/HCC) C50.112 174.1    Z17.0 V86.0   2. Status post left breast lumpectomy Z98.890 V45.89   3. At risk for lymphedema Z91.89 V49.89   4. Left hand pain M79.642 729.5       Patient Active Problem List   Diagnosis   • Malignant neoplasm of central portion of left breast in female, estrogen receptor positive (CMS/HCC)   • Memory deficits        Past Medical History:   Diagnosis Date   • Arthritis     BACK   • Breast lump     LEFT   • Cancer (CMS/HCC) 2019    BREAST LEFT   • Hip pain, left    • History of back pain    • History of colon polyp    • History of osteopenia    • IBS (irritable bowel syndrome)    • Mild scoliosis    • Numbness in feet     LEFT SECOND TOE, GETTING INJECTIONS FOR THIS   • Seasonal allergies         Past Surgical History:   Procedure Laterality Date   • ANTERIOR AND POSTERIOR VAGINAL REPAIR  2010   • BREAST AUGMENTATION Bilateral    • BREAST BIOPSY Left 2018    Procedure: BREAST BIOPSY;  Surgeon: Mike Adams MD;  Location: [facility];  Service: General   • BREAST LUMPECTOMY WITH SENTINEL NODE BIOPSY Left 2019    Procedure: BREAST LUMPECTOMY WITH SENTINEL NODE BIOPSY;  Surgeon: Mike Adams MD;  Location: [facility];  Service: General   • BUNIONECTOMY Bilateral     RIGHT       LEFT    • HERNIA REPAIR Left 1994    INGUINAL    • HERNIA REPAIR Left 1996    FEMORAL   • HYSTERECTOMY  2001   • LUMBAR FUSION  2018    L 4-5       Visit Dx:    ICD-10-CM ICD-9-CM   1. Malignant neoplasm of central portion of left breast in female, estrogen receptor positive (CMS/HCC) C50.112 174.1    Z17.0 V86.0   2. Status post left  breast lumpectomy Z98.890 V45.89   3. At risk for lymphedema Z91.89 V49.89   4. Left hand pain M79.642 729.5           Lymphedema     Row Name 08/25/20 1010             Lymphedema Assessment    Lymphedema Classification  LUE:;at risk/stage 0;secondary at risk; left breast  -SC      Lymphedema Cancer Related Sx  left;lumpectomy;sentinel node biopsy  -SC      Lymphedema Surgery Comments  02/20/2019  -SC      Lymph Nodes Removed #  2  -SC      Positive Lymph Nodes #  0  -SC      Chemo Received  no  -SC      Radiation Therapy Received  yes  -SC      Radiation Treatments #/Timeframe  Apr to May 2019  -SC      Infections or Cellulitis?  no  -SC      Lymphedema Assessment Comments  low risk  -SC         Lymphedema Edema Assessment    Edema Assessment Comment  negative visible edema  -SC         Skin Changes/Observations    Skin Observations Comment  intact  -SC         Lymphedema Sensation    Lymphedema Sensation Comments  intact  -SC         Lymphedema Pulses/Capillary Refill    Lymph Pulses Capillary Refill Comments  intact  -SC         Lymphedema Measurements    Lymphedema Measurements Comments  measurements obtained for OTS compression  -SC         Manual Lymphatic Drainage    Manual Therapy  education of self massage techniques (and pump if indicated)  -SC         Compression/Skin Care    Compression/Skin Care Comments  education of arthritic compression gloves to start, OTS compression sleeve and glove medical grade for lymphedema, night compression  -SC         L-Dex Bioimpedence Screening    L-Dex Measurement Extremity  LUE  -SC      L-Dex Patient Position  Standing  -SC      L-Dex UE Dominate Side  Right  -SC      L-Dex UE At Risk Side  Left  -SC      L-Dex UE Pre Surgical Value  No  -SC      L-Dex UE Score  0.9  -SC      L-Dex UE Baseline Score  -3.8  -SC      L-Dex UE Value Change  4.7  -SC      L-Dex UE Comment  WNL, but elevated near 5 points, initiate compression and monitor closely  -SC        User Key  (r)  = Recorded By, (t) = Taken By, (c) = Cosigned By    Initials Name Provider Type    Kitty Pringle PT Physical Therapist            PT Ortho     Row Name 08/25/20 1010       Subjective Comments    Subjective Comments  Re-eval. I was having issues with amnesia on the Femera so Dr. Malik switched me to Arimidex. I haven't had any issues since then, but I did have right shoulder surgery, pretty massive on 05/26/2020. They used my left wrist for IV and left arm for BP. After the surgery was in sling for a long time and couldn't use right arm at all so using the left a lot. Started PT for my shoulder and noticed that my wedding ring wasn't fitting on the left. Got really tight. I still cannot wear it. Wearing one of the rubber rings right now. And I notice now when I  my hand tight there is pain near the ring fingers. I don't really feel limited with the hand or weak but it does feel full and tight.  -SC       Precautions and Contraindications    Precautions/Limitations  other (see comments)  -SC       Subjective Pain    Able to rate subjective pain?  yes  -SC    Pre-Treatment Pain Level  0  -SC    Subjective Pain Comment  at rest, pain with tight   -SC       Posture/Observations    Posture- WNL  Posture is WNL  -SC       General ROM    GENERAL ROM COMMENTS  L UE WNLs  -SC       MMT (Manual Muscle Testing)    General MMT Comments  L UE WNLs  -SC      User Key  (r) = Recorded By, (t) = Taken By, (c) = Cosigned By    Initials Name Provider Type    Kitty Pringle PT Physical Therapist                    Therapy Education  Education Details: bioimpedance test and results, lymphedema, stages, treatment, elevation, stress ball, diet/nutrition, self massage, dry brushing, compression options, formal care if indicated  Given: HEP, Symptoms/condition management, Pain management, Mobility training, Edema management, Other (comment)  Program: New  How Provided: Verbal, Demonstration, Written  Provided to:  Patient  Level of Understanding: Teach back education performed, Verbalized, Demonstrated      OP Exercises     Row Name 08/25/20 1010             Subjective Comments    Subjective Comments  Re-eval. I was having issues with amnesia on the Femera so Dr. Malik switched me to Arimidex. I haven't had any issues since then, but I did have right shoulder surgery, pretty massive on 05/26/2020. They used my left wrist for IV and left arm for BP. After the surgery was in sling for a long time and couldn't use right arm at all so using the left a lot. Started PT for my shoulder and noticed that my wedding ring wasn't fitting on the left. Got really tight. I still cannot wear it. Wearing one of the rubber rings right now. And I notice now when I  my hand tight there is pain near the ring fingers. I don't really feel limited with the hand or weak but it does feel full and tight.  -SC         Subjective Pain    Able to rate subjective pain?  yes  -SC      Pre-Treatment Pain Level  0  -SC      Subjective Pain Comment  at rest, pain with tight   -SC        User Key  (r) = Recorded By, (t) = Taken By, (c) = Cosigned By    Initials Name Provider Type    Kitty Pringle, PT Physical Therapist                      PT OP Goals     Row Name 08/25/20 1010          PT Short Term Goals    STG Date to Achieve  09/22/20  -SC     STG 1  Patient independent and compliant with self-massage techniques with spouse/family member as needed for improved lymphatic drainage, decreased edema/symptoms, decreased risk of infection and improved transition to self-care of condition.   -SC     STG 1 Progress  New  -SC     STG 2  Patient independent and compliant with daytime OTS prevention compression garments as indicated for decreased risk of lymphedema and infection and safety with transition of self-care of condition.   -SC     STG 2 Progress  New  -SC     STG 3  Patient with negative pain with gripping left hand for improved function  with ADLs  -SC     STG 3 Progress  New  -SC        Long Term Goals    LTG Date to Achieve  11/24/20  -SC     LTG 1  Patient's bioimpedance score to remain between -10 and 6.5 for decreased risk of developing stage II post lumpectomy lymphedema syndrome left UE and to establish treatment for early intervention of condition if/when indicated.  -SC     LTG 1 Progress  New  -SC     LTG 1 Progress Comments  currently 0.9  -SC     LTG 2  Patient able to wear wedding ring left ring finger without adjustments for improved lymphatic flow.  -SC     LTG 2 Progress  New  -SC        Time Calculation    PT Goal Re-Cert Due Date  11/24/20  -SC       User Key  (r) = Recorded By, (t) = Taken By, (c) = Cosigned By    Initials Name Provider Type    Kitty Pringle, PT Physical Therapist          PT Assessment/Plan     Row Name 08/25/20 1010          PT Assessment    Functional Limitations  Limitation in home management;Limitations in community activities;Performance in leisure activities;Performance in sport activities;Performance in work activities  -SC     Impairments  Edema;Endurance;Impaired flexibility;Impaired lymphatic circulation;Impaired muscle endurance;Integumentary integrity;Pain  -SC     Assessment Comments  Mrs. Eldridge is a 60 year old female, history of left breast cancer, s/p left lumpectomy with 0/3 L ALN (+) performed by Dr. Adams 02/20/2019, did not require chemotherapy but completed adjuvant radiation from April to May 2019. She is currently on Arimidex, switched from Femara in Feb/April 2020 due to memory issues. She then underwent R shoulder scope with RTC repair, labral, subacromial decompression in May 2020, currently going to ortho OP PT for treatment. States after surgery noted wedding ring was tight. No with mild pain in region. Unable to wear ring on left at this time. Does report tightness in hand. Returns for lymphatic assessment today with no visible or measureable edema. Bioimpedance L-Dex is  WNL however is slightly elevated for patient. She was provided in depth education of home program to continue while going to therapy for her right shoulder. She is to return in one month to reassess. I will contact her in 2 weeks to determine progress with home program. Encouraged to contact me during this time with any questions, concerns or changes in symptoms.   -SC        PT Plan    Predicted Duration of Therapy Intervention (Therapy Eval)  one month f/u  -SC     PT Plan Comments  contact in 2 weeks to determine progress, order compression OTS if indicated sleeve/glove, initiate formal care if indicated  -SC       User Key  (r) = Recorded By, (t) = Taken By, (c) = Cosigned By    Initials Name Provider Type    SC Kitty Venegas, PT Physical Therapist             Outcome Measure Options: Quick DASH  Quick DASH  Quick Dash Comments: unable to assess correctly due to right shoulder         Time Calculation:   Start Time: 1010(patient arrived at 1008 for 1000 appointment)  Stop Time: 1040  Time Calculation (min): 30 min   Therapy Charges for Today     Code Description Service Date Service Provider Modifiers Qty    [id number]  PT THER PROC EA 15 MIN 8/25/2020 Kitty Venegas, PT GP 1    [id number]  PT RE-EVAL ESTABLISHED PLAN 2 8/25/2020 Kitty Venegas, PT GP 1          PT G-Codes  Outcome Measure Options: Quick DASH         Kitty Easley PT  8/25/2020

## 2020-09-25 ENCOUNTER — TELEPHONE (OUTPATIENT)
Dept: ONCOLOGY | Facility: CLINIC | Age: 60
End: 2020-09-25

## 2020-09-25 NOTE — TELEPHONE ENCOUNTER
Pt calling with worsening pain in her left hand. She reports swelling, joint pain and stiffness. This started a few weeks ago. Pt has been on Arimidex since April. D/W Dr. Malik. Per Dr. Malik, pt should hold Arimidex for 2 weeks to see if this helps. He will talk to her about her symptoms when he sees her on 10/13. Informed pt and she v/u.

## 2020-09-28 ENCOUNTER — APPOINTMENT (OUTPATIENT)
Dept: PHYSICAL THERAPY | Facility: HOSPITAL | Age: 60
End: 2020-09-28

## 2020-10-13 ENCOUNTER — LAB (OUTPATIENT)
Dept: LAB | Facility: HOSPITAL | Age: 60
End: 2020-10-13

## 2020-10-13 ENCOUNTER — APPOINTMENT (OUTPATIENT)
Dept: LAB | Facility: HOSPITAL | Age: 60
End: 2020-10-13

## 2020-10-13 ENCOUNTER — OFFICE VISIT (OUTPATIENT)
Dept: ONCOLOGY | Facility: CLINIC | Age: 60
End: 2020-10-13

## 2020-10-13 VITALS
SYSTOLIC BLOOD PRESSURE: 135 MMHG | BODY MASS INDEX: 24.84 KG/M2 | HEIGHT: 63 IN | RESPIRATION RATE: 19 BRPM | HEART RATE: 77 BPM | DIASTOLIC BLOOD PRESSURE: 83 MMHG | OXYGEN SATURATION: 100 % | WEIGHT: 140.2 LBS | TEMPERATURE: 97.5 F

## 2020-10-13 DIAGNOSIS — R41.3 MEMORY DEFICITS: ICD-10-CM

## 2020-10-13 DIAGNOSIS — Z17.0 MALIGNANT NEOPLASM OF CENTRAL PORTION OF LEFT BREAST IN FEMALE, ESTROGEN RECEPTOR POSITIVE (HCC): ICD-10-CM

## 2020-10-13 DIAGNOSIS — C50.112 MALIGNANT NEOPLASM OF CENTRAL PORTION OF LEFT BREAST IN FEMALE, ESTROGEN RECEPTOR POSITIVE (HCC): ICD-10-CM

## 2020-10-13 DIAGNOSIS — Z17.0 MALIGNANT NEOPLASM OF CENTRAL PORTION OF LEFT BREAST IN FEMALE, ESTROGEN RECEPTOR POSITIVE (HCC): Primary | ICD-10-CM

## 2020-10-13 DIAGNOSIS — C50.112 MALIGNANT NEOPLASM OF CENTRAL PORTION OF LEFT BREAST IN FEMALE, ESTROGEN RECEPTOR POSITIVE (HCC): Primary | ICD-10-CM

## 2020-10-13 LAB
ALBUMIN SERPL-MCNC: 4.5 G/DL (ref 3.5–5.2)
ALBUMIN/GLOB SERPL: 1.9 G/DL (ref 1.1–2.4)
ALP SERPL-CCNC: 104 U/L (ref 38–116)
ALT SERPL W P-5'-P-CCNC: 18 U/L (ref 0–33)
ANION GAP SERPL CALCULATED.3IONS-SCNC: 7.9 MMOL/L (ref 5–15)
AST SERPL-CCNC: 25 U/L (ref 0–32)
BASOPHILS # BLD AUTO: 0.01 10*3/MM3 (ref 0–0.2)
BASOPHILS NFR BLD AUTO: 0.2 % (ref 0–1.5)
BILIRUB SERPL-MCNC: 0.2 MG/DL (ref 0.2–1.2)
BUN SERPL-MCNC: 9 MG/DL (ref 6–20)
BUN/CREAT SERPL: 11.1 (ref 7.3–30)
CALCIUM SPEC-SCNC: 9.4 MG/DL (ref 8.5–10.2)
CHLORIDE SERPL-SCNC: 103 MMOL/L (ref 98–107)
CO2 SERPL-SCNC: 28.1 MMOL/L (ref 22–29)
CREAT SERPL-MCNC: 0.81 MG/DL (ref 0.6–1.1)
DEPRECATED RDW RBC AUTO: 46.1 FL (ref 37–54)
EOSINOPHIL # BLD AUTO: 0.05 10*3/MM3 (ref 0–0.4)
EOSINOPHIL NFR BLD AUTO: 1.1 % (ref 0.3–6.2)
ERYTHROCYTE [DISTWIDTH] IN BLOOD BY AUTOMATED COUNT: 12.5 % (ref 12.3–15.4)
ERYTHROCYTE [SEDIMENTATION RATE] IN BLOOD: 7 MM/HR (ref 0–30)
GFR SERPL CREATININE-BSD FRML MDRD: 72 ML/MIN/1.73
GLOBULIN UR ELPH-MCNC: 2.4 GM/DL (ref 1.8–3.5)
GLUCOSE SERPL-MCNC: 136 MG/DL (ref 74–124)
HCT VFR BLD AUTO: 41 % (ref 34–46.6)
HGB BLD-MCNC: 13.5 G/DL (ref 12–15.9)
IMM GRANULOCYTES # BLD AUTO: 0.01 10*3/MM3 (ref 0–0.05)
IMM GRANULOCYTES NFR BLD AUTO: 0.2 % (ref 0–0.5)
LYMPHOCYTES # BLD AUTO: 1.08 10*3/MM3 (ref 0.7–3.1)
LYMPHOCYTES NFR BLD AUTO: 24 % (ref 19.6–45.3)
MCH RBC QN AUTO: 32.9 PG (ref 26.6–33)
MCHC RBC AUTO-ENTMCNC: 32.9 G/DL (ref 31.5–35.7)
MCV RBC AUTO: 100 FL (ref 79–97)
MONOCYTES # BLD AUTO: 0.4 10*3/MM3 (ref 0.1–0.9)
MONOCYTES NFR BLD AUTO: 8.9 % (ref 5–12)
NEUTROPHILS NFR BLD AUTO: 2.95 10*3/MM3 (ref 1.7–7)
NEUTROPHILS NFR BLD AUTO: 65.6 % (ref 42.7–76)
NRBC BLD AUTO-RTO: 0 /100 WBC (ref 0–0.2)
PLATELET # BLD AUTO: 173 10*3/MM3 (ref 140–450)
PMV BLD AUTO: 7.9 FL (ref 6–12)
POTASSIUM SERPL-SCNC: 3.7 MMOL/L (ref 3.5–4.7)
PROT SERPL-MCNC: 6.9 G/DL (ref 6.3–8)
RBC # BLD AUTO: 4.1 10*6/MM3 (ref 3.77–5.28)
SODIUM SERPL-SCNC: 139 MMOL/L (ref 134–145)
WBC # BLD AUTO: 4.5 10*3/MM3 (ref 3.4–10.8)

## 2020-10-13 PROCEDURE — 85025 COMPLETE CBC W/AUTO DIFF WBC: CPT

## 2020-10-13 PROCEDURE — 80053 COMPREHEN METABOLIC PANEL: CPT

## 2020-10-13 PROCEDURE — 36415 COLL VENOUS BLD VENIPUNCTURE: CPT

## 2020-10-13 PROCEDURE — 99214 OFFICE O/P EST MOD 30 MIN: CPT | Performed by: INTERNAL MEDICINE

## 2020-10-13 PROCEDURE — 85652 RBC SED RATE AUTOMATED: CPT | Performed by: INTERNAL MEDICINE

## 2020-10-13 NOTE — PROGRESS NOTES
Subjective     REASON FOR FOLLOW UP:  Left breast cancer 15 mm in size, ductal invasive, grade II,small lobular component, and dcis, sp/ lumpectomy, negative sentinel lymph nodes, Er + . Pr positive her 2 negative by IHC, ONCOTYPE DX LOW RISK, ON ADJUVANT FEMARA FOR 5 YEARS STARTED AFTER SURGERY ON 2 /19    History of Present Illness   PATIENT WAS CALLED THE DAY BEFORE BY THE OFFICE TO ASK FOR SYMPTOMS THAT COULD BE CONSISTENT WITH CORONAVIRUS INFECTION, AND BEING NEGATIVE WAS SCHEDULED TO BE SEEN IN THE OFFICE TODAY. SIMILAR QUESTIONING TODAY INCLUDING, CHILLS, FEVER, NEW COUGH, SHORTNESS OF BREATH, DIARRHEA,DIFFUSE BODY ACHES  AND CHANGES IN SMELL OR TASTE WERE NEGATIVE.THE PATIENT DENIED ANY CONTACT WITH PERSONS WHO WERE POSITIVE FOR COVID, AND PATIENT IS NOT IN CATEGORY OF HIGH RISK BEHAVIOR TO ACQUIRE COVID.    DURING THE VISIT WITH THE PATIENT TODAY , PATIENT HAD FACE MASK, MY MEDICAL ASSISTANT AND I  HAD PROPPER PROTECTIVE EQUIPMENT, AND I DID HAND HYGIENE WITH SOAP AND WATER BEFORE AND AFTER THE VISIT.    This patient returns today to the office because she has been taking Arimidex in the background of adjuvant therapy for her breast cancer. She called the office a few days ago complaining that she had pain in the left upper extremity in absence of any edema and she said that she had some functional limitation. Further taking the idea of what was going on with her the patient states that she has been favoring sleeping on the left side of her body and she sleeps all crunched up with bent arms and forearms. She is avoiding the right side of the body because of the previous shoulder surgery. This area is still painful and she is undergoing still therapy for this and she has not fully recovered. Now she has no pain in the left shoulder. Occasional pain in the cubital fossa on the left elbow, occasional pain in the cubital fossa in the right elbow, a sensation of occasional big fingers in the left hand but no  obvious edema or local inflammation. She has had some decrease in the . She denies any trauma. She has not had any lymphedema. She has not had any changes in the color of the extremity like purple or pale. She also had occasional pain in the knees. No pain in the hips. No pain in the ankles. No pain in the back. Obviously raised the question if the Arimidex is the cause of the problem. She does not believe that stopping the Arimidex has made too much of difference in regard to the symptomatology. She has been off Arimidex now for almost 2 weeks.     The other issue is that the patient has to go to the wedding of her son in [place] in a few days and she asked me the favor not to give her any medicine until she returns back to [place] in 6 weeks.          Past Medical History:   Diagnosis Date   • Arthritis     BACK   • Breast lump     LEFT   • Cancer (CMS/HCC) 02/2019    BREAST LEFT   • Hip pain, left    • History of back pain    • History of colon polyp    • History of osteopenia    • IBS (irritable bowel syndrome)    • Mild scoliosis    • Numbness in feet     LEFT SECOND TOE, GETTING INJECTIONS FOR THIS   • Seasonal allergies         Past Surgical History:   Procedure Laterality Date   • ANTERIOR AND POSTERIOR VAGINAL REPAIR  04/2010   • BREAST AUGMENTATION Bilateral 2015   • BREAST BIOPSY Left 12/20/2018    Procedure: BREAST BIOPSY;  Surgeon: Mike Adams MD;  Location: [facility];  Service: General   • BREAST LUMPECTOMY WITH SENTINEL NODE BIOPSY Left 2/20/2019    Procedure: BREAST LUMPECTOMY WITH SENTINEL NODE BIOPSY;  Surgeon: Mike Adams MD;  Location: [facility];  Service: General   • BUNIONECTOMY Bilateral     RIGHT       LEFT 2-2015   • HERNIA REPAIR Left 03/1994    INGUINAL    • HERNIA REPAIR Left 04/1996    FEMORAL   • HYSTERECTOMY  02/2001   • LUMBAR FUSION  07/11/2018    L 4-5        Current Outpatient Medications on File Prior to Visit   Medication Sig Dispense  Refill   • buPROPion SR (WELLBUTRIN SR) 150 MG 12 hr tablet Take 150 mg by mouth Daily.     • coenzyme Q10 100 MG capsule Take 200 mg by mouth Daily.     • dicyclomine (BENTYL) 20 MG tablet Take 20 mg by mouth Every 6 (Six) Hours As Needed.     • fluticasone (FLONASE) 50 MCG/ACT nasal spray 2 sprays by Each Nare route Daily.     • KRILL OIL PO Take  by mouth.     • Milk Thistle 1000 MG capsule Take 240 mg by mouth Daily.     • Multiple Vitamins-Minerals (MULTIVITAMIN WITH MINERALS) tablet tablet Take 1 tablet by mouth Daily.       No current facility-administered medications on file prior to visit.         ALLERGIES:  No Known Allergies     Social History     Socioeconomic History   • Marital status:      Spouse name: Tommy   • Number of children: 4   • Years of education: Technical school   • Highest education level: Not on file   Occupational History     Employer: RETIRED   Social Needs   • Financial resource strain: Not hard at all   • Food insecurity     Worry: Never true     Inability: Never true   • Transportation needs     Medical: No     Non-medical: No   Tobacco Use   • Smoking status: Former Smoker     Packs/day: 0.50     Years: 10.00     Pack years: 5.00     Types: Cigarettes     Quit date:      Years since quittin.8   • Smokeless tobacco: Never Used   • Tobacco comment: QUIT 20-30 YEARS AGO   Substance and Sexual Activity   • Alcohol use: Yes     Alcohol/week: 7.0 - 14.0 standard drinks     Types: 7 - 14 Glasses of wine per week     Frequency: 4 or more times a week     Drinks per session: 1 or 2     Binge frequency: Never   • Drug use: No   • Sexual activity: Yes     Partners: Male     Birth control/protection: Post-menopausal, Surgical     Comment: spouseTommy        Family History   Problem Relation Age of Onset   • Breast cancer Mother 70        had it twice   • Esophageal cancer Father 80   • Leukemia Brother    • Colon cancer Brother    • Malig Hyperthermia Neg Hx        ONCOLOGIC HISTORY:I have been asked to see this patient in consultation today by Willian Adams MD after she has undergone a left breast lumpectomy in the background of abnormal mammogram that has been an ongoing issue since first of 2018. On that occasion she was seen by her obstetrician/gynecologist and a mammogram and ultrasound were on a couple of occasions negative benign. The patient was reviewed again by Dr. Adams by the end of 2018 and given the fact that the patient was having minimal nipple retraction, it was decided to go ahead and proceed with a biopsy that documented ductal carcinoma invasive with lobular component and DCIS. Since then the patient has undergone a lumpectomy and sentinel lymph node sampling. The patient is known for having breast implants that have been present for at least 10 or 12 years. These structures were not touched or modified during the recent surgical time. In any event, the patient has healed well from surgery. She has some leftover pain in the left axilla and minimal numbness but otherwise good rotation and range of motion for her left upper extremity. Her appetite has been normal. Her bowel function has been normal. Urination has been normal. She has not had any cardiovascular or respiratory issues of any kind and no bone pain or joint pain. She has had chronic back pain now for almost 2 years related to degenerative disease and previous surgery. This pain is no different in character or timing. The patient denies any neurological symptomatology. Otherwise, she is in excellent health. ONCOTYPE DX LOW RISK, ADJUVANT FEMARA INITIATED 1/19 X 5 YEARS. FEMARA STOPPED 2/21/2020 DUE TO MEMORY DEFICIT.ARIMIDEX 1 MG STARTED 4/14/2020 AND TO CALL IN A WEEK TO EVALUTE MEMORY ISSUES WITH THIS MEDICATION. ARIMIDEX ON HOLD 10/20 BECAUSE JOINT PAIN      ROS:          General: no fever, no chills,  fatigue,no weight changes, no lack of appetite.  Eyes: no epiphora,  xerophthalmia,conjunctivitis, pain, glaucoma, blurred vision, blindness, secretion, photophobia, proptosis, diplopia.  Ears: no otorrhea, tinnitus, otorrhagia, deafness, pain, vertigo.  Nose: no rhinorrhea, no epistaxis, no alteration in perception of odors, no sinuses pressure.  Mouth: no alteration in gums or teeth,  No ulcers, no difficulty with mastication or deglut ion, no odynophagia.  Neck: no masses or pain, no thyroid alterations, no pain in muscles or arteries, no carotid odynia, no crepitation.  Respiratory: no cough, no sputum production,no dyspnea,no trepopnea, no pleuritic pain,no hemoptysis.  Heart: no syncope, no irregularity, no palpitations, no angina,no orthopnea,no paroxysmal nocturnal dyspnea.  Vascular Venous: no tenderness,no edema,no palpable cords,no postphlebitic syndrome, no skin changes no ulcerations.  Vascular Arterial: no distal ischemia, noclaudication, no gangrene, no neuropathic ischemic pain, no skin ulcers, no paleness no cyanosis.  GI: no dysphagia, no odynophagia, no regurgitation, no heartburn,no indigestion,no nausea,no vomiting,no hematemesis ,no melena,no jaundice,no distention, no obstipation,no enterorrhagia,no proctalgia,no anal  lesions, no changes in bowel habits.  : no frequency, no hesitancy, no hematuria, no discharge,no  pain.  Musculoskeletal: STATED muscle or tendon pain or inflammation,no  joint pain, no edema, MODERATE functional limitation,no fasciculations, no mass.  Neurologic: no headache, no seizures, noalterations on Craneal nerves, no motor deficit, no sensory deficit, normal coordination, no alteration in memory,normal orientation, calculation,normal writting, verbal and written language.  Skin: no rashes,no pruritus no localized lesions.  Psychiatric: no anxiety, no depression,no agitation, no delusions, proper insight.    Objective     Vitals:    10/13/20 1522   BP: 135/83   Pulse: 77   Resp: 19   Temp: 97.5 °F (36.4 °C)   TempSrc: Temporal   SpO2:  "100%   Weight: 63.6 kg (140 lb 3.2 oz)   Height: 160.7 cm (63.27\")   PainSc:   3   PainLoc: Shoulder  Comment: surgery     Current Status 10/13/2020   ECOG score 0       EXAM: I HAVE PERSONALLY REVIEWED THE HISTORY OF THE PRESENT ILLNESS, PAST MEDICAL HISTORY, FAMILY HISTORY, SOCIAL HISTORY, ALLERGIES, MEDICATIONS STATED ABOVE IN THE OFFICE NOTE FROM TODAY.        GENERAL:  Well-developed, well-nourished  Patient  in no acute distress.   SKIN:  Warm, dry ,NO rashes,NO purpura ,NO petechiae.  HEENT:  Pupils were equal and reactive to light and accomodation, conjunctivae noninjected, no pterigion, normal extraocular movements, normal visual acuity.   NECK:  Supple with good range of motion; no thyromegaly or masses, no JVD or bruits, no cervical adenopathies.No carotid artery pain, no carotid abnormal pulsation , NO arterial dance.  LYMPHATICS:  No cervical, NO supraclavicular, NO axillary,NO epitrochlear , NO inguinal adenopathy.  CARDIAC   normal rate and regular rhythm, without murmur,NO rubs NO S3 NO S4 right or left . Normal femoral, popliteal, pedis, brachial and carotid pulses.  VASCULAR ARTERIAL: normal carotids,brachial,radial,femoral,popliteal, pedis pulses , no bruits.no paleness or cyanosis, no pain, no edema, no numbness, no gangrene.  VASCULAR VENOUS: no cyanosis, collateral circulation, varicosities, edema, palpable cords, pain, erythema.  ABDOMEN:  Soft, nontender with no hepatomegaly, no splenomegaly,no masses, no ascites, no collateral circulation,no distention,no Louis sign, no abdominal pain, no inguinal hernias,no umbilical hernia, no abdominal bruits. Normal bowel sounds.  GENITAL: Not  Performed.  EXTREMITIES  AND SPINE:  Inspection of the right shoulder shows minimal local inflammation and edema after the previous surgery. The range of motion is decreased and the abduction is not complete. She is barely able to go to 80 degrees abduction. Rotation is almost impossible. The left shoulder " movement is normal for the rotations and abduction. Minimal tenderness in the cubital fossa on the left side. No pain in the cubital fossa on the right side. No pain in the olecranon. Both elbow functions were normal. Wrist was normal with no alteration in mobility, no tenderness and no local inflammation. Fingers disclosed no evidence of swelling, no discoloration, no modification in temperature or capillary refill. Sensory modalities for deltoid muscle, C5, C6, C7, C8, both upper extremities remain normal, symmetric. Strength was minimally decreased in the left wrist.  was 3 over 4. Right hand  was 4 over 4. She was right-handed. She had no tenderness in the hips, no tenderness in the spine, no tenderness in the knees or ankles. No local inflammation. Her gait was normal.         RECENT LABS:  Hematology WBC   Date Value Ref Range Status   10/13/2020 4.50 3.40 - 10.80 10*3/mm3 Final     RBC   Date Value Ref Range Status   10/13/2020 4.10 3.77 - 5.28 10*6/mm3 Final     Hemoglobin   Date Value Ref Range Status   10/13/2020 13.5 12.0 - 15.9 g/dL Final     Hematocrit   Date Value Ref Range Status   10/13/2020 41.0 34.0 - 46.6 % Final     Platelets   Date Value Ref Range Status   10/13/2020 173 140 - 450 10*3/mm3 Final            Assessment/Plan    1.  Breast Cancer: noticed abnormalities with deviation of the nipple in the summer of 2018 on the left breast. Repeat ultrasound and mammograms documented so called “cysts” that looked benign. Given the persistency of the abnormality and her concern, she was again seen by Dr. Adams in 12/2018. She underwent a biopsy that turned out to document a ductal carcinoma with lobular feature, grade 2. This triggered subsequently an MRI that disclosed on 01/09/2019, no residual malignancy and this triggered a lumpectomy that documented a residual malignancy that was 16 mm in size. This malignancy documented an invasive ductal with lobular features grade 2 and also she had  a 5 mm foci of DCIS. [place] lymph nodes were removed, none of them having any malignant metastatic disease. Margins of resection were negative.     The patient also had analysis of the tumor for ER strongly positive 98%, DC positive, HER2/mak negative by immunohistochemistry. Ki-67 was 11%.     In summary, I do believe that the so-called cystic lesions close to the nipple in the last year were probably representation of the lobular component of her disease and later on this became more obvious and triggered further radiological analysis. The patient has stage I breast cancer on the left. She has completed a lumpectomy with complete resection, negative margins, negative sentinel lymph nodes. The tumor is highly ER positive, DC positive, HER2/mak 1+ by immunohistochemistry and negative sentinel lymph nodes. Ki-67. To me this is a very prognostic feature.     Also the patient has undergone genetic analysis and all of this was negative. This was done given the fact that the patient’s mother developed breast cancer at age 60 and she  as a consequence of breast cancer years later.       After reviewing her Oncotype DX  along with her  and providing her the report her risk of recurrence score is 19 therefore she is in the low risk category and the benefit of chemotherapy for her will be minimal if any at all. On the other hand she will benefit from hormonal therapy. For this reason I discussed with her the fact that I would like for her to initiate Femara at a dose of 2.5 mg a day for the next 5 years. The medicine will be not only protecting her from developing recurrent breast cancer but will be effective as well decreasing the chance of contralateral new primary breast cancer. I explained to her side-effects of the medicine including joint pain that most of the time is transitory and improves with exercise and also the development of worsening osteoporosis. The patient has had a bone density test done more  than 2 years ago and I am going to go ahead and schedule a bone density test before she returns back in 5 weeks.     Upon reviewing the patient on 02/13/2020, she was doing fantastic with excellent tolerance to Femara and on clinical examination, her breast implants disclosed no obvious abnormalities. The wall nodule documented in the anterior aspect of the left breast close to where the nipple used to be measures 8 mm in size and has the consistency of a fat lobule. Careful methodic examination of the axillae and upper extremities disclosed no alterations.     Laboratory workup shows a white count of 4500, hemoglobin of 13, MCV of 100. Platelet count was 173,000.      This patient has history of left breast cancer. As far as I can tell she has nothing to suggest recurrent disease. Her Oncotype described in the previous notes at the time of her diagnosis disclosed no risk of recurrence. She was placed on letrozole. She had memory deficit with this medicine. The medicine was discontinued and memory recovered. Then she was placed on Arimidex and she has been on this medicine now for several months. This medicine has been stopped 2 weeks ago because of the belief that she was getting some element of enthesopathy with this medication. Maybe that is the case. Also I do believe that I have suspicion of some cubital fossa compression given the position that she ends up in the bed to avoid sleeping on the right side because of the pain in the right shoulder area. On the other hand I have no other way to say that Arimidex has something to do with the symptoms that she has. Her white count is normal, hemoglobin is normal, platelet count is normal. Chemistry profile is pending and a sedimentation rate is pending. It caught my attention the MCV of 100. Further questioning to the patient she admits that she is drinking 2 glasses of wine every day. I wonder if the wine is producing more affects than she thinks, at least the  macrocytosis is direct effect of the wine. I asked her to modify the wine volume to adjust the wine glass to a small glass a day at the most.     I think it will be okay for her to remain off of Arimidex until she comes back from [place] in 6 weeks. I went ahead and sent a prescription for Naprosyn 500 mg b.i.d. for 7 days  with food to try to decrease inflammation and pain and see how she feels.     I advised her to take a multivitamin that contains folic acid and vitamin B12 given her drinking history.     I will review her back in 6 weeks with a CBC and a CMP. If the sedimentation rate is elevated we will need to bring her back for further conversation and discussion about how to proceed at that time.            I DISCUSSED WITH PATIENT IN DETAIL FORMS TO DECREASE CHANCES OF CORONAVIRUS INFECTION INCLUDING ISOLATION, PROPER HAND HIGIENE, AVOID PUBLIC PLACES  WITH CROWDS, FOLLOW [employer] AND CDC RECOMENDATIONS, AND KEEP PERSONAL AND SOCIAL RESPONSIBILITY.  PATIENT IS AWARE THIS INFECTION COULD HAVE SEVERE CONSEQUENCES TO PERSONAL HEALTH AND FAMILY RAMIFICATIONS OF THIS.

## 2020-10-15 ENCOUNTER — TELEPHONE (OUTPATIENT)
Dept: ONCOLOGY | Facility: CLINIC | Age: 60
End: 2020-10-15

## 2020-10-15 RX ORDER — NAPROXEN 500 MG/1
500 TABLET ORAL 2 TIMES DAILY WITH MEALS
Qty: 14 TABLET | Refills: 0 | Status: SHIPPED | OUTPATIENT
Start: 2020-10-15

## 2020-10-15 NOTE — TELEPHONE ENCOUNTER
Naprosyn was supposed to be sent in at MD visit on Tuesday but did not happen. Directions listed in dictation. Script routed to MD for signature and pt aware.

## 2020-10-15 NOTE — TELEPHONE ENCOUNTER
PATIENT WAS SEEN ON TUES AND WAS SUPPOSE TO HAVE A SCRIPT FOR NAPRPSYN 500 MG CALLED IN, BUT THE PHARMACY DID NOT RECEIVE IT, [phone], PLEASE ADVISE?    PT CALL BACK #[phone]

## 2020-11-16 RX ORDER — ANASTROZOLE 1 MG/1
TABLET ORAL
Qty: 90 TABLET | Refills: 0 | Status: SHIPPED | OUTPATIENT
Start: 2020-11-16 | End: 2020-12-04 | Stop reason: SINTOL

## 2020-12-04 ENCOUNTER — TELEPHONE (OUTPATIENT)
Dept: ONCOLOGY | Facility: CLINIC | Age: 60
End: 2020-12-04

## 2020-12-04 ENCOUNTER — APPOINTMENT (OUTPATIENT)
Dept: LAB | Facility: HOSPITAL | Age: 60
End: 2020-12-04

## 2020-12-04 ENCOUNTER — OFFICE VISIT (OUTPATIENT)
Dept: ONCOLOGY | Facility: CLINIC | Age: 60
End: 2020-12-04

## 2020-12-04 DIAGNOSIS — Z17.0 MALIGNANT NEOPLASM OF CENTRAL PORTION OF LEFT BREAST IN FEMALE, ESTROGEN RECEPTOR POSITIVE (HCC): Primary | ICD-10-CM

## 2020-12-04 DIAGNOSIS — C50.112 MALIGNANT NEOPLASM OF CENTRAL PORTION OF LEFT BREAST IN FEMALE, ESTROGEN RECEPTOR POSITIVE (HCC): Primary | ICD-10-CM

## 2020-12-04 DIAGNOSIS — R41.3 MEMORY DEFICITS: ICD-10-CM

## 2020-12-04 PROCEDURE — 99442 PR PHYS/QHP TELEPHONE EVALUATION 11-20 MIN: CPT | Performed by: INTERNAL MEDICINE

## 2020-12-04 RX ORDER — EXEMESTANE 25 MG/1
25 TABLET ORAL DAILY
Qty: 30 TABLET | Refills: 3 | Status: SHIPPED | OUTPATIENT
Start: 2020-12-04 | End: 2021-03-16 | Stop reason: SDUPTHER

## 2020-12-04 NOTE — TELEPHONE ENCOUNTER
Caller: SARITHA BERMAN    Relationship to patient: SELF    Best call back number: [phone]    PT WOULD LIKE DR CORDOVA OR A NURSE TO CALL HER WHEN POSSIBLE, SHE HAS QUESTIONS REGARDING THE NEW RX DR CORDOVA IS HAVING FILLED FOR HER

## 2020-12-04 NOTE — PROGRESS NOTES
Subjective     REASON FOR FOLLOW UP:  Left breast cancer 15 mm in size, ductal invasive, grade II,small lobular component, and dcis, sp/ lumpectomy, negative sentinel lymph nodes, Er + . Pr positive her 2 negative by IHC, ONCOTYPE DX LOW RISK, ON ADJUVANT FEMARA FOR 5 YEARS STARTED AFTER SURGERY ON 2 /19    History of Present Illness   I HAVE CALLED THIS PATIENT ON THE PHONE TODAY AND VERBALLY HAS CONSENTED ABOUT TELEMEDICINE VISIT   I have reviewed this patient today by Telemedicine.  It has been several weeks since the time that the patient was advised to stop Arimidex in the background of pain in both shoulders, both elbows and upper extremities most likely associated with aromatase inhibitor.  Since the discontinuation of the medicine 10 days later, these symptoms have resolved without the need of any other intervention and they have not been recurrent.  The patient has not resumed the medicine at all.  She feels terrific otherwise.  Her appetite is good.  Her weight is stable.  Her bowel function and urination are normal.  No other new issues otherwise.          Past Medical History:   Diagnosis Date   • Arthritis     BACK   • Breast lump     LEFT   • Cancer (CMS/HCC) 02/2019    BREAST LEFT   • Hip pain, left    • History of back pain    • History of colon polyp    • History of osteopenia    • IBS (irritable bowel syndrome)    • Mild scoliosis    • Numbness in feet     LEFT SECOND TOE, GETTING INJECTIONS FOR THIS   • Seasonal allergies         Past Surgical History:   Procedure Laterality Date   • ANTERIOR AND POSTERIOR VAGINAL REPAIR  04/2010   • BREAST AUGMENTATION Bilateral 2015   • BREAST BIOPSY Left 12/20/2018    Procedure: BREAST BIOPSY;  Surgeon: Mike Adams MD;  Location: [facility];  Service: General   • BREAST LUMPECTOMY WITH SENTINEL NODE BIOPSY Left 2/20/2019    Procedure: BREAST LUMPECTOMY WITH SENTINEL NODE BIOPSY;  Surgeon: Mike Adams MD;  Location: [facility];   Service: General   • BUNIONECTOMY Bilateral     RIGHT       LEFT    • HERNIA REPAIR Left 1994    INGUINAL    • HERNIA REPAIR Left 1996    FEMORAL   • HYSTERECTOMY  2001   • LUMBAR FUSION  2018    L 4-5        Current Outpatient Medications on File Prior to Visit   Medication Sig Dispense Refill   • anastrozole (ARIMIDEX) 1 MG tablet TAKE ONE TABLET BY MOUTH DAILY 90 tablet 0   • buPROPion SR (WELLBUTRIN SR) 150 MG 12 hr tablet Take 150 mg by mouth Daily.     • coenzyme Q10 100 MG capsule Take 200 mg by mouth Daily.     • dicyclomine (BENTYL) 20 MG tablet Take 20 mg by mouth Every 6 (Six) Hours As Needed.     • fluticasone (FLONASE) 50 MCG/ACT nasal spray 2 sprays by Each Nare route Daily.     • KRILL OIL PO Take  by mouth.     • Milk Thistle 1000 MG capsule Take 240 mg by mouth Daily.     • Multiple Vitamins-Minerals (MULTIVITAMIN WITH MINERALS) tablet tablet Take 1 tablet by mouth Daily.     • naproxen (Naprosyn) 500 MG tablet Take 1 tablet by mouth 2 (Two) Times a Day With Meals. 14 tablet 0     No current facility-administered medications on file prior to visit.         ALLERGIES:  No Known Allergies     Social History     Socioeconomic History   • Marital status:      Spouse name: Tommy   • Number of children: 4   • Years of education: Technical school   • Highest education level: Not on file   Occupational History     Employer: RETIRED   Social Needs   • Financial resource strain: Not hard at all   • Food insecurity     Worry: Never true     Inability: Never true   • Transportation needs     Medical: No     Non-medical: No   Tobacco Use   • Smoking status: Former Smoker     Packs/day: 0.50     Years: 10.00     Pack years: 5.00     Types: Cigarettes     Quit date:      Years since quittin.9   • Smokeless tobacco: Never Used   • Tobacco comment: QUIT 20-30 YEARS AGO   Substance and Sexual Activity   • Alcohol use: Yes     Alcohol/week: 7.0 - 14.0 standard drinks      Types: 7 - 14 Glasses of wine per week     Frequency: 4 or more times a week     Drinks per session: 1 or 2     Binge frequency: Never   • Drug use: No   • Sexual activity: Yes     Partners: Male     Birth control/protection: Post-menopausal, Surgical     Comment: spouse, Tommy        Family History   Problem Relation Age of Onset   • Breast cancer Mother 70        had it twice   • Esophageal cancer Father 80   • Leukemia Brother    • Colon cancer Brother    • Malig Hyperthermia Neg Hx       ONCOLOGIC HISTORY:I have been asked to see this patient in consultation today by Willian Adams MD after she has undergone a left breast lumpectomy in the background of abnormal mammogram that has been an ongoing issue since first of 2018. On that occasion she was seen by her obstetrician/gynecologist and a mammogram and ultrasound were on a couple of occasions negative benign. The patient was reviewed again by Dr. Adams by the end of 2018 and given the fact that the patient was having minimal nipple retraction, it was decided to go ahead and proceed with a biopsy that documented ductal carcinoma invasive with lobular component and DCIS. Since then the patient has undergone a lumpectomy and sentinel lymph node sampling. The patient is known for having breast implants that have been present for at least 10 or 12 years. These structures were not touched or modified during the recent surgical time. In any event, the patient has healed well from surgery. She has some leftover pain in the left axilla and minimal numbness but otherwise good rotation and range of motion for her left upper extremity. Her appetite has been normal. Her bowel function has been normal. Urination has been normal. She has not had any cardiovascular or respiratory issues of any kind and no bone pain or joint pain. She has had chronic back pain now for almost 2 years related to degenerative disease and previous surgery. This pain is no different in  character or timing. The patient denies any neurological symptomatology. Otherwise, she is in excellent health. ONCOTYPE DX LOW RISK, ADJUVANT FEMARA INITIATED 1/19 X 5 YEARS. FEMARA STOPPED 2/21/2020 DUE TO MEMORY DEFICIT.ARIMIDEX 1 MG STARTED 4/14/2020 AND TO CALL IN A WEEK TO EVALUTE MEMORY ISSUES WITH THIS MEDICATION. ARIMIDEX ON HOLD 10/20 BECAUSE JOINT PAIN      ROS:                  General: no fever, no chills, no fatigue,no weight changes, no lack of appetite.  Eyes: no epiphora, xerophthalmia,conjunctivitis, pain, glaucoma, blurred vision, blindness, secretion, photophobia, proptosis, diplopia.  Ears: no otorrhea, tinnitus, otorrhagia, deafness, pain, vertigo.  Nose: no rhinorrhea, no epistaxis, no alteration in perception of odors, no sinuses pressure.  Mouth: no alteration in gums or teeth,  No ulcers, no difficulty with mastication or deglut ion, no odynophagia.  Neck: no masses or pain, no thyroid alterations, no pain in muscles or arteries, no carotid odynia, no crepitation.  Respiratory: no cough, no sputum production,no dyspnea,no trepopnea, no pleuritic pain,no hemoptysis.  Heart: no syncope, no irregularity, no palpitations, no angina,no orthopnea,no paroxysmal nocturnal dyspnea.  Vascular Venous: no tenderness,no edema,no palpable cords,no postphlebitic syndrome, no skin changes no ulcerations.  Vascular Arterial: no distal ischemia, noclaudication, no gangrene, no neuropathic ischemic pain, no skin ulcers, no paleness no cyanosis.  GI: no dysphagia, no odynophagia, no regurgitation, no heartburn,no indigestion,no nausea,no vomiting,no hematemesis ,no melena,no jaundice,no distention, no obstipation,no enterorrhagia,no proctalgia,no anal  lesions, no changes in bowel habits.  : no frequency, no hesitancy, no hematuria, no discharge,no  pain.  Musculoskeletal: no muscle or tendon pain or inflammation,no  joint pain, no edema, no functional limitation,no fasciculations, no mass.  Neurologic: no  headache, no seizures, noalterations on Craneal nerves, no motor deficit, no sensory deficit, normal coordination, no alteration in memory,normal orientation, calculation,normal writting, verbal and written language.  Skin: no rashes,no pruritus no localized lesions.  Psychiatric: no anxiety, no depression,no agitation, no delusions, proper insight.    Objective     There were no vitals filed for this visit.  Current Status 10/13/2020   ECOG score 0       EXAM:  none      RECENT LABS:  Hematology WBC   Date Value Ref Range Status   10/13/2020 4.50 3.40 - 10.80 10*3/mm3 Final     RBC   Date Value Ref Range Status   10/13/2020 4.10 3.77 - 5.28 10*6/mm3 Final     Hemoglobin   Date Value Ref Range Status   10/13/2020 13.5 12.0 - 15.9 g/dL Final     Hematocrit   Date Value Ref Range Status   10/13/2020 41.0 34.0 - 46.6 % Final     Platelets   Date Value Ref Range Status   10/13/2020 173 140 - 450 10*3/mm3 Final            Assessment/Plan    1.  Breast Cancer: noticed abnormalities with deviation of the nipple in the summer of 2018 on the left breast. Repeat ultrasound and mammograms documented so called “cysts” that looked benign. Given the persistency of the abnormality and her concern, she was again seen by Dr. Adams in 12/2018. She underwent a biopsy that turned out to document a ductal carcinoma with lobular feature, grade 2. This triggered subsequently an MRI that disclosed on 01/09/2019, no residual malignancy and this triggered a lumpectomy that documented a residual malignancy that was 16 mm in size. This malignancy documented an invasive ductal with lobular features grade 2 and also she had a 5 mm foci of DCIS. [place] lymph nodes were removed, none of them having any malignant metastatic disease. Margins of resection were negative.     The patient also had analysis of the tumor for ER strongly positive 98%, WV positive, HER2/mak negative by immunohistochemistry. Ki-67 was 11%.     In summary, I do believe  that the so-called cystic lesions close to the nipple in the last year were probably representation of the lobular component of her disease and later on this became more obvious and triggered further radiological analysis. The patient has stage I breast cancer on the left. She has completed a lumpectomy with complete resection, negative margins, negative sentinel lymph nodes. The tumor is highly ER positive, CO positive, HER2/mak 1+ by immunohistochemistry and negative sentinel lymph nodes. Ki-67. To me this is a very prognostic feature.     Also the patient has undergone genetic analysis and all of this was negative. This was done given the fact that the patient’s mother developed breast cancer at age 60 and she  as a consequence of breast cancer years later.       After reviewing her Oncotype DX  along with her  and providing her the report her risk of recurrence score is 19 therefore she is in the low risk category and the benefit of chemotherapy for her will be minimal if any at all. On the other hand she will benefit from hormonal therapy. For this reason I discussed with her the fact that I would like for her to initiate Femara at a dose of 2.5 mg a day for the next 5 years. The medicine will be not only protecting her from developing recurrent breast cancer but will be effective as well decreasing the chance of contralateral new primary breast cancer. I explained to her side-effects of the medicine including joint pain that most of the time is transitory and improves with exercise and also the development of worsening osteoporosis. The patient has had a bone density test done more than 2 years ago and I am going to go ahead and schedule a bone density test before she returns back in 5 weeks.     Upon reviewing the patient on 2020, she was doing fantastic with excellent tolerance to Femara and on clinical examination, her breast implants disclosed no obvious abnormalities. The wall nodule  documented in the anterior aspect of the left breast close to where the nipple used to be measures 8 mm in size and has the consistency of a fat lobule. Careful methodic examination of the axillae and upper extremities disclosed no alterations.     Laboratory workup shows a white count of 4500, hemoglobin of 13, MCV of 100. Platelet count was 173,000.      This patient has history of left breast cancer. As far as I can tell she has nothing to suggest recurrent disease. Her Oncotype described in the previous notes at the time of her diagnosis disclosed no risk of recurrence. She was placed on letrozole. She had memory deficit with this medicine. The medicine was discontinued and memory recovered. Then she was placed on Arimidex and she has been on this medicine now for several months. This medicine has been stopped 2 weeks ago because of the belief that she was getting some element of enthesopathy with this medication. Maybe that is the case. Also I do believe that I have suspicion of some cubital fossa compression given the position that she ends up in the bed to avoid sleeping on the right side because of the pain in the right shoulder area. On the other hand I have no other way to say that Arimidex has something to do with the symptoms that she has. Her white count is normal, hemoglobin is normal, platelet count is normal. Chemistry profile is pending and a sedimentation rate is pending. It caught my attention the MCV of 100. Further questioning to the patient she admits that she is drinking 2 glasses of wine every day. I wonder if the wine is producing more affects than she thinks, at least the macrocytosis is direct effect of the wine. I asked her to modify the wine volume to adjust the wine glass to a small glass a day at the most.     I think it will be okay for her to remain off of Arimidex until she comes back from [place] in 6 weeks. I went ahead and sent a prescription for Naprosyn 500 mg b.i.d. for 7 days   with food to try to decrease inflammation and pain and see how she feels.     I reviewed this patient by Telemedicine on 12/04/2020.  Two weeks into stopping her Arimidex the joint pain in the shoulders, elbows and hands has disappeared and she has not had any recurrence.  She remains fully functional.  She feels fine otherwise.  Her breasts have not had any changes.  She has no new cardiovascular or respiratory issues and she remains in good shape.      RECOMMENDATIONS:  I strongly believe that this patient needs to try a different medicine to do adjuvant therapy for her breast cancer.  Femara produced issues in regard to memory, Arimidex issues with regard to joint pain.  Hopefully Aromasin it not going to produce any problems.  I advised her to initiate Aromasin as early as tomorrow at a dose of 25 mg a day.    The patient will be brought to the office for review on clinical grounds in 6 weeks.      I asked her to pay attention to side effects of the medicine in case there is any joint pain or memory issues happening again. If that was the case the only couple of choices that we will have are Evista or tamoxifen.  These will require a 1 to 1 discussion.    DURATION OF THE PHONE VISIT 20  MINUTES             I DISCUSSED WITH PATIENT IN DETAIL FORMS TO DECREASE CHANCES OF CORONAVIRUS INFECTION INCLUDING ISOLATION, PROPER HAND HIGIENE, AVOID PUBLIC PLACES  WITH CROWDS, FOLLOW [employer] AND CDC RECOMENDATIONS, AND KEEP PERSONAL AND SOCIAL RESPONSIBILITY.  PATIENT IS AWARE THIS INFECTION COULD HAVE SEVERE CONSEQUENCES TO PERSONAL HEALTH AND FAMILY RAMIFICATIONS OF THIS.

## 2020-12-08 ENCOUNTER — APPOINTMENT (OUTPATIENT)
Dept: MAMMOGRAPHY | Facility: HOSPITAL | Age: 60
End: 2020-12-08

## 2020-12-08 ENCOUNTER — APPOINTMENT (OUTPATIENT)
Dept: ULTRASOUND IMAGING | Facility: HOSPITAL | Age: 60
End: 2020-12-08

## 2021-01-04 ENCOUNTER — HOSPITAL ENCOUNTER (OUTPATIENT)
Dept: MAMMOGRAPHY | Facility: HOSPITAL | Age: 61
Discharge: HOME | End: 2021-01-04

## 2021-01-04 ENCOUNTER — HOSPITAL ENCOUNTER (OUTPATIENT)
Dept: ULTRASOUND IMAGING | Facility: HOSPITAL | Age: 61
Discharge: HOME | End: 2021-01-04

## 2021-01-04 DIAGNOSIS — C50.112 MALIGNANT NEOPLASM OF CENTRAL PORTION OF LEFT BREAST IN FEMALE, ESTROGEN RECEPTOR POSITIVE (HCC): ICD-10-CM

## 2021-01-04 DIAGNOSIS — Z17.0 MALIGNANT NEOPLASM OF CENTRAL PORTION OF LEFT BREAST IN FEMALE, ESTROGEN RECEPTOR POSITIVE (HCC): ICD-10-CM

## 2021-01-04 PROCEDURE — 77066 DX MAMMO INCL CAD BI: CPT

## 2021-01-04 PROCEDURE — 76642 ULTRASOUND BREAST LIMITED: CPT

## 2021-01-04 PROCEDURE — G0279 TOMOSYNTHESIS, MAMMO: HCPCS

## 2021-03-16 ENCOUNTER — TELEPHONE (OUTPATIENT)
Dept: ONCOLOGY | Facility: CLINIC | Age: 61
End: 2021-03-16

## 2021-03-16 DIAGNOSIS — C50.112 MALIGNANT NEOPLASM OF CENTRAL PORTION OF LEFT BREAST IN FEMALE, ESTROGEN RECEPTOR POSITIVE (HCC): Primary | ICD-10-CM

## 2021-03-16 DIAGNOSIS — Z17.0 MALIGNANT NEOPLASM OF CENTRAL PORTION OF LEFT BREAST IN FEMALE, ESTROGEN RECEPTOR POSITIVE (HCC): Primary | ICD-10-CM

## 2021-03-16 RX ORDER — EXEMESTANE 25 MG/1
25 TABLET ORAL DAILY
Qty: 90 TABLET | Refills: 3 | Status: SHIPPED | OUTPATIENT
Start: 2021-03-16 | End: 2021-04-12

## 2021-03-16 NOTE — TELEPHONE ENCOUNTER
PT. INFORMED DR. CORDOVA WANTS TO SEE HER IN June, BUT PT.  IS GOING TO BE OUT OF TOWN BETWEEN June 2ND TO July 1ST.  WILL SEND  MESSAGE TO THE APPT DESK TO GET HER SCHEDULED FOR WHEN SHE RETURNS IN July.  1 UNIT WITH A CBC, AND CMP. PT. AGREEABLE.

## 2021-03-16 NOTE — TELEPHONE ENCOUNTER
PT. STATES SHE IS DOING WELL ON THE AROMASIN 25MG DAILY.  WOULD LIKE THE SCRIPT SENT TO [facility]-3 MONTH SUPPLY WITH REFILLS.  PT. DOESN'T HAVE A RETURN APPT WILL CHECK WITH DR. CORDOVA WHEN HE WANTS TO SEE HER BACK IN THE OFFICE AND HAVE THE APPT DESK CALL HER WITH APPT.  UNDERSTANDING NOTED.

## 2021-03-22 ENCOUNTER — BULK ORDERING (OUTPATIENT)
Dept: CASE MANAGEMENT | Facility: OTHER | Age: 61
End: 2021-03-22

## 2021-03-22 DIAGNOSIS — Z23 IMMUNIZATION DUE: ICD-10-CM

## 2021-04-12 RX ORDER — EXEMESTANE 25 MG/1
TABLET ORAL
Qty: 90 TABLET | Refills: 2 | Status: SHIPPED | OUTPATIENT
Start: 2021-04-12 | End: 2022-01-10 | Stop reason: SDUPTHER

## 2021-07-08 ENCOUNTER — APPOINTMENT (OUTPATIENT)
Dept: LAB | Facility: HOSPITAL | Age: 61
End: 2021-07-08

## 2021-12-10 ENCOUNTER — TRANSCRIBE ORDERS (OUTPATIENT)
Dept: ADMINISTRATIVE | Facility: HOSPITAL | Age: 61
End: 2021-12-10

## 2021-12-10 DIAGNOSIS — Z12.31 SCREENING MAMMOGRAM, ENCOUNTER FOR: Primary | ICD-10-CM

## 2021-12-29 ENCOUNTER — TELEPHONE (OUTPATIENT)
Dept: ONCOLOGY | Facility: CLINIC | Age: 61
End: 2021-12-29

## 2021-12-29 NOTE — TELEPHONE ENCOUNTER
Caller: Alesha Eldridge    Relationship: Self    Best call back number: [phone]    What is the best time to reach you: ANYTIME    CAN LEAVE VOICEMAIL IF NO ANSWER    Who are you requesting to speak with (clinical staff, provider,  specific staff member): DR CORDOVA OR HIS NURSE    Do you know the name of the person who called: ALESHA     What was the call regarding:   HAVING SHOULDER SURGERY REPLACEMENT ON 01/11 WITH DR SELF, [facility]  AND THEY ARE WANTING TO KNOW IF IS OK TO STOP TAKING MEDIATION EXEMESTANE PRIOR TO SURGERY OR IF NEEDING TO STAY ON MEDICATION     PLEASE CALL TO ADVISE    Do you require a callback: YES

## 2021-12-29 NOTE — TELEPHONE ENCOUNTER
Returned call to patient with the information after reviewing with Dr. Malik.  She is to hold her Exemestane for 7 days prior to surgery and 7 days post surgery.

## 2022-01-06 ENCOUNTER — APPOINTMENT (OUTPATIENT)
Dept: MAMMOGRAPHY | Facility: HOSPITAL | Age: 62
End: 2022-01-06

## 2022-01-10 ENCOUNTER — TELEPHONE (OUTPATIENT)
Dept: ONCOLOGY | Facility: CLINIC | Age: 62
End: 2022-01-10

## 2022-01-10 ENCOUNTER — APPOINTMENT (OUTPATIENT)
Dept: MAMMOGRAPHY | Facility: HOSPITAL | Age: 62
End: 2022-01-10

## 2022-01-10 RX ORDER — EXEMESTANE 25 MG/1
25 TABLET ORAL DAILY
Qty: 90 TABLET | Refills: 2 | Status: SHIPPED | OUTPATIENT
Start: 2022-01-10 | End: 2022-10-24 | Stop reason: SDUPTHER

## 2022-01-19 ENCOUNTER — HOSPITAL ENCOUNTER (OUTPATIENT)
Dept: MAMMOGRAPHY | Facility: HOSPITAL | Age: 62
Discharge: HOME | End: 2022-01-19
Admitting: RADIOLOGY

## 2022-01-19 DIAGNOSIS — Z12.31 SCREENING MAMMOGRAM, ENCOUNTER FOR: ICD-10-CM

## 2022-01-19 PROCEDURE — 77063 BREAST TOMOSYNTHESIS BI: CPT

## 2022-01-19 PROCEDURE — 77067 SCR MAMMO BI INCL CAD: CPT

## 2022-09-09 ENCOUNTER — TELEPHONE (OUTPATIENT)
Dept: ONCOLOGY | Facility: CLINIC | Age: 62
End: 2022-09-09

## 2022-09-09 NOTE — TELEPHONE ENCOUNTER
Returned call to patient and left message that Dr. Malik is out of the office and I will send this message to him to return her call on Monday.

## 2022-09-09 NOTE — TELEPHONE ENCOUNTER
Caller: Alesha Eldridge    Relationship: Self    Best call back number: [phone]    What is the best time to reach you: ANYTIME    Who are you requesting to speak with (clinical staff, provider,  specific staff member): DR CORDOVA OR NURSE    What was the call regarding: PT IS MOVING TO [place]. SHE WOULD LIKE TO KNOW IF DR CORDOVA HAS ANY RECS FOR DRS IN THE [facility] OR [facility] AREA. THEY ARE LEAVING 9/26 SO IF HE CAN FIT HER IN FOR AN APPT BEFORE THEN JUST LET HER KNOW. SHE IS ALSO AVAILABLE FOR A PHONE VISIT.     PLEASE CALL TO ADVISE.     Do you require a callback: YES

## 2022-09-12 ENCOUNTER — TELEPHONE (OUTPATIENT)
Dept: ONCOLOGY | Facility: CLINIC | Age: 62
End: 2022-09-12

## 2022-09-12 NOTE — TELEPHONE ENCOUNTER
Called pt to inform her that Dr. Malik is out unexpectedly, but that we would get back with her as soon as possible. Pt v/u. Edyta Salmeron RN

## 2022-09-13 NOTE — TELEPHONE ENCOUNTER
Called pt and informed her Dr. Malik would be out the remainder of the week and is rounding next week. Unfortunately unable to work her in. No recommendation from Dr. Malik for Onc in [place], but advised she find a PCP first. Pt v/u. Edyta Salmeron RN

## 2022-10-10 RX ORDER — EXEMESTANE 25 MG/1
TABLET ORAL
Refills: 8 | OUTPATIENT
Start: 2022-10-10

## 2022-10-24 ENCOUNTER — TELEPHONE (OUTPATIENT)
Dept: ONCOLOGY | Facility: CLINIC | Age: 62
End: 2022-10-24

## 2022-10-24 RX ORDER — EXEMESTANE 25 MG/1
25 TABLET ORAL DAILY
Qty: 90 TABLET | Refills: 2 | Status: SHIPPED | OUTPATIENT
Start: 2022-10-24

## 2022-10-24 NOTE — TELEPHONE ENCOUNTER
Patient last seen in office 12/2020.  Moved to [place], Aromasin refill requested last week and was refused, due to no appointment since 12/2020.   Check with Dr. Malik and refill if he authorizes.

## 2022-10-24 NOTE — TELEPHONE ENCOUNTER
Called pt to see if she has found oncologist locally. Pt is working on finding one, but requesting Dr. King rodriguesasin through the end of the year. Will discuss w/ Dr. Malik and send refill. Edyta Salmeron RN

## 2022-10-24 NOTE — TELEPHONE ENCOUNTER
Dr. King garcia'bhavesh rodriguesasin through the end of the year. Script sent. Edyta Salmeron RN

## 2023-11-10 ENCOUNTER — TELEPHONE (OUTPATIENT)
Dept: ONCOLOGY | Facility: CLINIC | Age: 63
End: 2023-11-10

## 2023-11-10 NOTE — TELEPHONE ENCOUNTER
"  Caller: Alesha Eldridge \"CHAPIN\"    Relationship: Self    Best call back number: [phone]        What was the call regarding: PT NEEDS HER PATH REPORT AND ONCO TEST RESULTS, SENT TO HER NEW SURGEON DR CORRAL, PT DIDN'T HAVE FAX BUT HAD OFFICE NUMBER [phone]         "

## (undated) DEVICE — LOU MINOR PROCEDURE: Brand: MEDLINE INDUSTRIES, INC.

## (undated) DEVICE — 3M™ STERI-STRIP™ COMPOUND BENZOIN TINCTURE 40 BAGS/CARTON 4 CARTONS/CASE C1544: Brand: 3M™ STERI-STRIP™

## (undated) DEVICE — ANTIBACTERIAL UNDYED BRAIDED (POLYGLACTIN 910), SYNTHETIC ABSORBABLE SUTURE: Brand: COATED VICRYL

## (undated) DEVICE — PENCL E/S HNDSWTCH SMOKEEVAC HOLSTR 10FT

## (undated) DEVICE — SYR LUERLOK 5CC

## (undated) DEVICE — SPNG GZ WOVN 4X4IN 12PLY 10/BX STRL

## (undated) DEVICE — SKIN PREP TRAY W/CHG: Brand: MEDLINE INDUSTRIES, INC.

## (undated) DEVICE — 3M™ STERI-STRIP™ REINFORCED ADHESIVE SKIN CLOSURES, R1547, 1/2 IN X 4 IN (12 MM X 100 MM), 6 STRIPS/ENVELOPE: Brand: 3M™ STERI-STRIP™

## (undated) DEVICE — ELECTRD BLD EDGE/INSUL1P 2.4X5.1MM STRL

## (undated) DEVICE — NDL HYPO ECLPS SFTY 22G 1 1/2IN

## (undated) DEVICE — SUT SILK 2/0 FS BLK 18IN 685G

## (undated) DEVICE — DRSNG SURESITE WNDW 4X4.5

## (undated) DEVICE — PK CHST BRST 40

## (undated) DEVICE — SUT VIC 3/0 TIES 18IN J110T

## (undated) DEVICE — NDL HYPO PRECISIONGLIDE REG 25G 1 1/2

## (undated) DEVICE — Device: Brand: FABCO

## (undated) DEVICE — CVR TRANSD CIV FLX TPR 11.9 TO 3.8X61CM

## (undated) DEVICE — ENCORE® LATEX ORTHO SIZE 8, STERILE LATEX POWDER-FREE SURGICAL GLOVE: Brand: ENCORE

## (undated) DEVICE — TUBING, SUCTION, 1/4" X 20', STRAIGHT: Brand: MEDLINE INDUSTRIES, INC.

## (undated) DEVICE — INTENDED FOR TISSUE SEPARATION, AND OTHER PROCEDURES THAT REQUIRE A SHARP SURGICAL BLADE TO PUNCTURE OR CUT.: Brand: BARD-PARKER ® CARBON RIB-BACK BLADES